# Patient Record
Sex: FEMALE | Employment: OTHER | URBAN - METROPOLITAN AREA
[De-identification: names, ages, dates, MRNs, and addresses within clinical notes are randomized per-mention and may not be internally consistent; named-entity substitution may affect disease eponyms.]

---

## 2017-07-25 ENCOUNTER — APPOINTMENT (OUTPATIENT)
Dept: CT IMAGING | Age: 67
End: 2017-07-25
Attending: EMERGENCY MEDICINE
Payer: COMMERCIAL

## 2017-07-25 ENCOUNTER — HOSPITAL ENCOUNTER (EMERGENCY)
Age: 67
Discharge: HOME | End: 2017-07-25
Attending: EMERGENCY MEDICINE
Payer: COMMERCIAL

## 2017-07-25 VITALS
WEIGHT: 167.11 LBS | TEMPERATURE: 98 F | SYSTOLIC BLOOD PRESSURE: 123 MMHG | HEART RATE: 104 BPM | RESPIRATION RATE: 18 BRPM | DIASTOLIC BLOOD PRESSURE: 69 MMHG | BODY MASS INDEX: 29.61 KG/M2 | HEIGHT: 63 IN | OXYGEN SATURATION: 97 %

## 2017-07-25 DIAGNOSIS — R10.13 ABDOMINAL PAIN, EPIGASTRIC: Primary | ICD-10-CM

## 2017-07-25 DIAGNOSIS — M54.50 ACUTE MIDLINE LOW BACK PAIN WITHOUT SCIATICA: ICD-10-CM

## 2017-07-25 LAB
ALBUMIN SERPL BCP-MCNC: 3.7 G/DL (ref 3.5–5)
ALBUMIN/GLOB SERPL: 0.9 {RATIO} (ref 1.1–2.2)
ALP SERPL-CCNC: 93 U/L (ref 45–117)
ALT SERPL-CCNC: 27 U/L (ref 12–78)
ANION GAP BLD CALC-SCNC: 8 MMOL/L (ref 5–15)
APPEARANCE UR: ABNORMAL
AST SERPL W P-5'-P-CCNC: 21 U/L (ref 15–37)
BACTERIA URNS QL MICRO: ABNORMAL /HPF
BASOPHILS # BLD AUTO: 0.1 K/UL (ref 0–0.1)
BASOPHILS # BLD: 1 % (ref 0–1)
BILIRUB SERPL-MCNC: 0.6 MG/DL (ref 0.2–1)
BILIRUB UR QL: NEGATIVE
BUN SERPL-MCNC: 29 MG/DL (ref 6–20)
BUN/CREAT SERPL: 28 (ref 12–20)
CALCIUM SERPL-MCNC: 9 MG/DL (ref 8.5–10.1)
CHLORIDE SERPL-SCNC: 103 MMOL/L (ref 97–108)
CO2 SERPL-SCNC: 26 MMOL/L (ref 21–32)
COLOR UR: ABNORMAL
CREAT SERPL-MCNC: 1.04 MG/DL (ref 0.55–1.02)
DIFFERENTIAL METHOD BLD: ABNORMAL
EOSINOPHIL # BLD: 0.3 K/UL (ref 0–0.4)
EOSINOPHIL NFR BLD: 3 % (ref 0–7)
EPITH CASTS URNS QL MICRO: ABNORMAL /LPF
ERYTHROCYTE [DISTWIDTH] IN BLOOD BY AUTOMATED COUNT: 12.8 % (ref 11.5–14.5)
ETHANOL SERPL-MCNC: <10 MG/DL
GLOBULIN SER CALC-MCNC: 3.9 G/DL (ref 2–4)
GLUCOSE SERPL-MCNC: 115 MG/DL (ref 65–100)
GLUCOSE UR STRIP.AUTO-MCNC: NEGATIVE MG/DL
HCT VFR BLD AUTO: 39.5 % (ref 35–47)
HGB BLD-MCNC: 13.4 G/DL (ref 11.5–16)
HGB UR QL STRIP: NEGATIVE
KETONES UR QL STRIP.AUTO: ABNORMAL MG/DL
LEUKOCYTE ESTERASE UR QL STRIP.AUTO: ABNORMAL
LIPASE SERPL-CCNC: 196 U/L (ref 73–393)
LYMPHOCYTES # BLD AUTO: 31 % (ref 12–49)
LYMPHOCYTES # BLD: 3.4 K/UL (ref 0.8–3.5)
MCH RBC QN AUTO: 36.7 PG (ref 26–34)
MCHC RBC AUTO-ENTMCNC: 33.9 G/DL (ref 30–36.5)
MCV RBC AUTO: 108.2 FL (ref 80–99)
MONOCYTES # BLD: 0.3 K/UL (ref 0–1)
MONOCYTES NFR BLD AUTO: 3 % (ref 5–13)
NEUTS SEG # BLD: 7 K/UL (ref 1.8–8)
NEUTS SEG NFR BLD AUTO: 62 % (ref 32–75)
NITRITE UR QL STRIP.AUTO: NEGATIVE
PH UR STRIP: 6 [PH] (ref 5–8)
PLATELET # BLD AUTO: 193 K/UL (ref 150–400)
POTASSIUM SERPL-SCNC: 3.5 MMOL/L (ref 3.5–5.1)
PROT SERPL-MCNC: 7.6 G/DL (ref 6.4–8.2)
PROT UR STRIP-MCNC: NEGATIVE MG/DL
RBC # BLD AUTO: 3.65 M/UL (ref 3.8–5.2)
RBC #/AREA URNS HPF: ABNORMAL /HPF (ref 0–5)
RBC MORPH BLD: ABNORMAL
SODIUM SERPL-SCNC: 137 MMOL/L (ref 136–145)
SP GR UR REFRACTOMETRY: 1.02 (ref 1–1.03)
TROPONIN I SERPL-MCNC: <0.04 NG/ML
UA: UC IF INDICATED,UAUC: ABNORMAL
UROBILINOGEN UR QL STRIP.AUTO: 0.2 EU/DL (ref 0.2–1)
WBC # BLD AUTO: 11.1 K/UL (ref 3.6–11)
WBC MORPH BLD: ABNORMAL
WBC URNS QL MICRO: ABNORMAL /HPF (ref 0–4)

## 2017-07-25 PROCEDURE — 74011636320 HC RX REV CODE- 636/320: Performed by: EMERGENCY MEDICINE

## 2017-07-25 PROCEDURE — 80053 COMPREHEN METABOLIC PANEL: CPT | Performed by: EMERGENCY MEDICINE

## 2017-07-25 PROCEDURE — 85025 COMPLETE CBC W/AUTO DIFF WBC: CPT | Performed by: EMERGENCY MEDICINE

## 2017-07-25 PROCEDURE — 36415 COLL VENOUS BLD VENIPUNCTURE: CPT | Performed by: EMERGENCY MEDICINE

## 2017-07-25 PROCEDURE — 93005 ELECTROCARDIOGRAM TRACING: CPT

## 2017-07-25 PROCEDURE — 84484 ASSAY OF TROPONIN QUANT: CPT | Performed by: EMERGENCY MEDICINE

## 2017-07-25 PROCEDURE — 83690 ASSAY OF LIPASE: CPT | Performed by: EMERGENCY MEDICINE

## 2017-07-25 PROCEDURE — 87086 URINE CULTURE/COLONY COUNT: CPT | Performed by: EMERGENCY MEDICINE

## 2017-07-25 PROCEDURE — 87147 CULTURE TYPE IMMUNOLOGIC: CPT | Performed by: EMERGENCY MEDICINE

## 2017-07-25 PROCEDURE — 74011250636 HC RX REV CODE- 250/636: Performed by: EMERGENCY MEDICINE

## 2017-07-25 PROCEDURE — 81001 URINALYSIS AUTO W/SCOPE: CPT | Performed by: EMERGENCY MEDICINE

## 2017-07-25 PROCEDURE — 99284 EMERGENCY DEPT VISIT MOD MDM: CPT

## 2017-07-25 PROCEDURE — 74177 CT ABD & PELVIS W/CONTRAST: CPT

## 2017-07-25 PROCEDURE — 80307 DRUG TEST PRSMV CHEM ANLYZR: CPT | Performed by: EMERGENCY MEDICINE

## 2017-07-25 RX ORDER — SODIUM CHLORIDE 9 MG/ML
50 INJECTION, SOLUTION INTRAVENOUS
Status: COMPLETED | OUTPATIENT
Start: 2017-07-25 | End: 2017-07-25

## 2017-07-25 RX ORDER — ACETAMINOPHEN AND CODEINE PHOSPHATE 300; 30 MG/1; MG/1
1-2 TABLET ORAL
Qty: 20 TAB | Refills: 0 | Status: SHIPPED | OUTPATIENT
Start: 2017-07-25 | End: 2017-08-04

## 2017-07-25 RX ORDER — SODIUM CHLORIDE 0.9 % (FLUSH) 0.9 %
10 SYRINGE (ML) INJECTION
Status: COMPLETED | OUTPATIENT
Start: 2017-07-25 | End: 2017-07-25

## 2017-07-25 RX ORDER — RANITIDINE 150 MG/1
150 TABLET, FILM COATED ORAL 2 TIMES DAILY
Qty: 20 TAB | Refills: 0 | Status: SHIPPED | OUTPATIENT
Start: 2017-07-25 | End: 2017-08-04

## 2017-07-25 RX ORDER — DICYCLOMINE HYDROCHLORIDE 20 MG/1
20 TABLET ORAL
Qty: 20 TAB | Refills: 0 | Status: SHIPPED | OUTPATIENT
Start: 2017-07-25 | End: 2017-11-09

## 2017-07-25 RX ADMIN — SODIUM CHLORIDE 50 ML/HR: 900 INJECTION, SOLUTION INTRAVENOUS at 18:05

## 2017-07-25 RX ADMIN — IOPAMIDOL 100 ML: 755 INJECTION, SOLUTION INTRAVENOUS at 18:05

## 2017-07-25 RX ADMIN — Medication 10 ML: at 18:05

## 2017-07-25 NOTE — DISCHARGE INSTRUCTIONS
Abdominal Pain: Care Instructions  Your Care Instructions    Abdominal pain has many possible causes. Some aren't serious and get better on their own in a few days. Others need more testing and treatment. If your pain continues or gets worse, you need to be rechecked and may need more tests to find out what is wrong. You may need surgery to correct the problem. Don't ignore new symptoms, such as fever, nausea and vomiting, urination problems, pain that gets worse, and dizziness. These may be signs of a more serious problem. Your doctor may have recommended a follow-up visit in the next 8 to 12 hours. If you are not getting better, you may need more tests or treatment. The doctor has checked you carefully, but problems can develop later. If you notice any problems or new symptoms, get medical treatment right away. Follow-up care is a key part of your treatment and safety. Be sure to make and go to all appointments, and call your doctor if you are having problems. It's also a good idea to know your test results and keep a list of the medicines you take. How can you care for yourself at home? · Rest until you feel better. · To prevent dehydration, drink plenty of fluids, enough so that your urine is light yellow or clear like water. Choose water and other caffeine-free clear liquids until you feel better. If you have kidney, heart, or liver disease and have to limit fluids, talk with your doctor before you increase the amount of fluids you drink. · If your stomach is upset, eat mild foods, such as rice, dry toast or crackers, bananas, and applesauce. Try eating several small meals instead of two or three large ones. · Wait until 48 hours after all symptoms have gone away before you have spicy foods, alcohol, and drinks that contain caffeine. · Do not eat foods that are high in fat. · Avoid anti-inflammatory medicines such as aspirin, ibuprofen (Advil, Motrin), and naproxen (Aleve).  These can cause stomach upset. Talk to your doctor if you take daily aspirin for another health problem. When should you call for help? Call 911 anytime you think you may need emergency care. For example, call if:  · You passed out (lost consciousness). · You pass maroon or very bloody stools. · You vomit blood or what looks like coffee grounds. · You have new, severe belly pain. Call your doctor now or seek immediate medical care if:  · Your pain gets worse, especially if it becomes focused in one area of your belly. · You have a new or higher fever. · Your stools are black and look like tar, or they have streaks of blood. · You have unexpected vaginal bleeding. · You have symptoms of a urinary tract infection. These may include:  ¨ Pain when you urinate. ¨ Urinating more often than usual.  ¨ Blood in your urine. · You are dizzy or lightheaded, or you feel like you may faint. Watch closely for changes in your health, and be sure to contact your doctor if:  · You are not getting better after 1 day (24 hours). Where can you learn more? Go to http://geminiLuciduxfeliciano.info/. Enter E056 in the search box to learn more about \"Abdominal Pain: Care Instructions. \"  Current as of: March 20, 2017  Content Version: 11.3  © 7034-6169 [employer]. Care instructions adapted under license by [employer] (which disclaims liability or warranty for this information). If you have questions about a medical condition or this instruction, always ask your healthcare professional. [place] [address] any warranty or liability for your use of this information. Back Pain: Care Instructions  Your Care Instructions    Back pain has many possible causes. It is often related to problems with muscles and ligaments of the back. It may also be related to problems with the nerves, discs, or bones of the back.  Moving, lifting, standing, sitting, or sleeping in an awkward way can strain the back. Sometimes you don't notice the injury until later. Arthritis is another common cause of back pain. Although it may hurt a lot, back pain usually improves on its own within several weeks. Most people recover in 12 weeks or less. Using good home treatment and being careful not to stress your back can help you feel better sooner. Follow-up care is a key part of your treatment and safety. Be sure to make and go to all appointments, and call your doctor if you are having problems. Its also a good idea to know your test results and keep a list of the medicines you take. How can you care for yourself at home? · Sit or lie in positions that are most comfortable and reduce your pain. Try one of these positions when you lie down:  ¨ Lie on your back with your knees bent and supported by large pillows. ¨ Lie on the floor with your legs on the seat of a sofa or chair. Anna Husk on your side with your knees and hips bent and a pillow between your legs. ¨ Lie on your stomach if it does not make pain worse. · Do not sit up in bed, and avoid soft couches and twisted positions. Bed rest can help relieve pain at first, but it delays healing. Avoid bed rest after the first day of back pain. · Change positions every 30 minutes. If you must sit for long periods of time, take breaks from sitting. Get up and walk around, or lie in a comfortable position. · Try using a heating pad on a low or medium setting for 15 to 20 minutes every 2 or 3 hours. Try a warm shower in place of one session with the heating pad. · You can also try an ice pack for 10 to 15 minutes every 2 to 3 hours. Put a thin cloth between the ice pack and your skin. · Take pain medicines exactly as directed. ¨ If the doctor gave you a prescription medicine for pain, take it as prescribed. ¨ If you are not taking a prescription pain medicine, ask your doctor if you can take an over-the-counter medicine. · Take short walks several times a day.  You can start with 5 to 10 minutes, 3 or 4 times a day, and work up to longer walks. Walk on level surfaces and avoid hills and stairs until your back is better. · Return to work and other activities as soon as you can. Continued rest without activity is usually not good for your back. · To prevent future back pain, do exercises to stretch and strengthen your back and stomach. Learn how to use good posture, safe lifting techniques, and proper body mechanics. When should you call for help? Call your doctor now or seek immediate medical care if:  · You have new or worsening numbness in your legs. · You have new or worsening weakness in your legs. (This could make it hard to stand up.)  · You lose control of your bladder or bowels. Watch closely for changes in your health, and be sure to contact your doctor if:  · Your pain gets worse. · You are not getting better after 2 weeks. Where can you learn more? Go to http://gemini-feliciano.info/. Enter E451 in the search box to learn more about \"Back Pain: Care Instructions. \"  Current as of: March 21, 2017  Content Version: 11.3  © 7155-1544 [employer]. Care instructions adapted under license by [employer] (which disclaims liability or warranty for this information). If you have questions about a medical condition or this instruction, always ask your healthcare professional. [address] any warranty or liability for your use of this information.

## 2017-07-25 NOTE — ED NOTES
Assumed care of pt from Young Mendes RN. Pt resting comfortably with side rails up and call bell in reach. Pt aware of plan to await for MD re-evaluation.

## 2017-07-25 NOTE — ED PROVIDER NOTES
HPI Comments: Horace Ballard is a 78 y.o. female with PMHx significant for EtOH abuse, foot drop and SHx significant for back surgery  who presents ambulatory to the ED with cc of intermittent upper abdominal pain with radiation to the back rated 10/10 x 4 days. There are no reported alleviating or exacerbating factors. Pt reports that she was referred to the ER by her PCP \"for an MRI\" ; pt states that she does notknow why her PCP wanted this test.  She states that she drinks EtOH daily but denies any h/o pancreatitis or ulcers. Of note, pt states that she has a h/o lower back surgery and foot drop. She denies any N/V/D, F/C, CP, SOB, dysuria/hematuria. PCP: Irving Cavazos MD    Social History: smoking (.5 ppd) GTQF(62/7 oz/week) illicit drug (-)      There are no other complaints, changes, or physical findings at this time. The history is provided by the patient. Past Medical History:   Diagnosis Date    Alcohol abuse     Foot drop, left        Past Surgical History:   Procedure Laterality Date    HX HYSTERECTOMY      HX TONSILLECTOMY           No family history on file. Social History     Social History    Marital status:      Spouse name: N/A    Number of children: N/A    Years of education: N/A     Occupational History    Not on file. Social History Main Topics    Smoking status: Current Every Day Smoker     Packs/day: 0.50    Smokeless tobacco: Not on file    Alcohol use 17.5 oz/week     35 Shots of liquor per week    Drug use: No    Sexual activity: Not on file     Other Topics Concern    Not on file     Social History Narrative    No narrative on file         ALLERGIES: Scallops    Review of Systems   Constitutional: Negative for chills and fever. HENT: Negative for congestion, ear pain, rhinorrhea, sore throat and trouble swallowing. Eyes: Negative for visual disturbance. Respiratory: Negative for cough, chest tightness and shortness of breath. Cardiovascular: Negative for chest pain and palpitations. Gastrointestinal: Positive for abdominal pain. Negative for blood in stool, constipation, diarrhea, nausea and vomiting. Genitourinary: Negative for decreased urine volume, difficulty urinating, dysuria and frequency. Musculoskeletal: Negative for back pain and neck pain. Skin: Negative for color change and rash. Neurological: Negative for dizziness, weakness, light-headedness and headaches. Vitals:    07/25/17 1631   BP: 134/70   Pulse: (!) 104   Resp: 18   Temp: 98 °F (36.7 °C)   SpO2: 96%   Weight: 75.8 kg (167 lb 1.7 oz)   Height: 5' 3\" (1.6 m)            Physical Exam   Constitutional: She is oriented to person, place, and time. Vital signs are normal. She appears well-developed and well-nourished. She does not appear ill. No distress. HENT:   Mouth/Throat: Oropharynx is clear and moist.   Eyes: Conjunctivae are normal.   Neck: Neck supple. Cardiovascular: Normal rate and regular rhythm. Pulmonary/Chest: Effort normal and breath sounds normal. No accessory muscle usage. No respiratory distress. Abdominal: Soft. She exhibits no distension. There is tenderness. RUQ and epigastric tenderness   Musculoskeletal: She exhibits tenderness. Midline upper L spine tenderness    Lymphadenopathy:     She has no cervical adenopathy. Neurological: She is alert and oriented to person, place, and time. She has normal strength. No cranial nerve deficit or sensory deficit. Skin: Skin is warm and dry. Nursing note and vitals reviewed. MDM  Number of Diagnoses or Management Options  Abdominal pain, epigastric:   Acute midline low back pain without sciatica:   Diagnosis management comments: Ddx: pancreatitis, hepatitis, gallbladder dz, lumbar compression fx, pinched nerve, PUD, gastritis    No evidence of pancreatitis. She may have a radiculopathy from her back.   MRI would be beneficial, but would be more appropriately done outpatient through her PCP. She may wish to see GI for endoscopy. Given her history of alcoholism, there is concern for peptic ulcers. Amount and/or Complexity of Data Reviewed  Clinical lab tests: ordered and reviewed  Tests in the radiology section of CPT®: reviewed and ordered  Review and summarize past medical records: yes    Patient Progress  Patient progress: stable    ED Course       Procedures  EKG interpretation: (Preliminary) 1924  Rhythm: normal sinus rhythm; and regular . Rate (approx.): 96; Axis: normal; SD interval: normal; QRS interval: normal ; ST/T wave: normal;  Other findings: normal.    DISCHARGE NOTE:  LABORATORY TESTS:  Recent Results (from the past 12 hour(s))   CBC WITH AUTOMATED DIFF    Collection Time: 07/25/17  4:47 PM   Result Value Ref Range    WBC 11.1 (H) 3.6 - 11.0 K/uL    RBC 3.65 (L) 3.80 - 5.20 M/uL    HGB 13.4 11.5 - 16.0 g/dL    HCT 39.5 35.0 - 47.0 %    .2 (H) 80.0 - 99.0 FL    MCH 36.7 (H) 26.0 - 34.0 PG    MCHC 33.9 30.0 - 36.5 g/dL    RDW 12.8 11.5 - 14.5 %    PLATELET 115 402 - 765 K/uL    NEUTROPHILS 62 32 - 75 %    LYMPHOCYTES 31 12 - 49 %    MONOCYTES 3 (L) 5 - 13 %    EOSINOPHILS 3 0 - 7 %    BASOPHILS 1 0 - 1 %    ABS. NEUTROPHILS 7.0 1.8 - 8.0 K/UL    ABS. LYMPHOCYTES 3.4 0.8 - 3.5 K/UL    ABS. MONOCYTES 0.3 0.0 - 1.0 K/UL    ABS. EOSINOPHILS 0.3 0.0 - 0.4 K/UL    ABS.  BASOPHILS 0.1 0.0 - 0.1 K/UL    DF SMEAR SCANNED      RBC COMMENTS MACROCYTOSIS  1+        WBC COMMENTS REACTIVE LYMPHS     METABOLIC PANEL, COMPREHENSIVE    Collection Time: 07/25/17  4:47 PM   Result Value Ref Range    Sodium 137 136 - 145 mmol/L    Potassium 3.5 3.5 - 5.1 mmol/L    Chloride 103 97 - 108 mmol/L    CO2 26 21 - 32 mmol/L    Anion gap 8 5 - 15 mmol/L    Glucose 115 (H) 65 - 100 mg/dL    BUN 29 (H) 6 - 20 MG/DL    Creatinine 1.04 (H) 0.55 - 1.02 MG/DL    BUN/Creatinine ratio 28 (H) 12 - 20      GFR est AA >60 >60 ml/min/1.73m2    GFR est non-AA 53 (L) >60 ml/min/1.73m2 Calcium 9.0 8.5 - 10.1 MG/DL    Bilirubin, total 0.6 0.2 - 1.0 MG/DL    ALT (SGPT) 27 12 - 78 U/L    AST (SGOT) 21 15 - 37 U/L    Alk. phosphatase 93 45 - 117 U/L    Protein, total 7.6 6.4 - 8.2 g/dL    Albumin 3.7 3.5 - 5.0 g/dL    Globulin 3.9 2.0 - 4.0 g/dL    A-G Ratio 0.9 (L) 1.1 - 2.2     LIPASE    Collection Time: 07/25/17  4:47 PM   Result Value Ref Range    Lipase 196 73 - 393 U/L   URINALYSIS W/ REFLEX CULTURE    Collection Time: 07/25/17  4:47 PM   Result Value Ref Range    Color YELLOW/STRAW      Appearance CLOUDY (A) CLEAR      Specific gravity 1.018 1.003 - 1.030      pH (UA) 6.0 5.0 - 8.0      Protein NEGATIVE  NEG mg/dL    Glucose NEGATIVE  NEG mg/dL    Ketone TRACE (A) NEG mg/dL    Bilirubin NEGATIVE  NEG      Blood NEGATIVE  NEG      Urobilinogen 0.2 0.2 - 1.0 EU/dL    Nitrites NEGATIVE  NEG      Leukocyte Esterase LARGE (A) NEG      WBC 10-20 0 - 4 /hpf    RBC 0-5 0 - 5 /hpf    Epithelial cells FEW FEW /lpf    Bacteria 1+ (A) NEG /hpf    UA:UC IF INDICATED URINE CULTURE ORDERED (A) CNI     TROPONIN I    Collection Time: 07/25/17  4:47 PM   Result Value Ref Range    Troponin-I, Qt. <0.04 <0.05 ng/mL   ETHYL ALCOHOL    Collection Time: 07/25/17  5:13 PM   Result Value Ref Range    ALCOHOL(ETHYL),SERUM <10 <10 MG/DL       IMAGING RESULTS:  CT ABD PELV W CONT   Final ResultEXAM: CT ABDOMEN PELVIS WITH CONTRAST  INDICATION:  Epigastric abdominal pain, 5 day history. COMPARISON: None. CONTRAST: 100 mL of Isovue-370.     TECHNIQUE:   Multislice helical CT was performed from the diaphragm to the symphysis pubis  during uneventful rapid bolus intravenous contrast administration. Oral contrast  was not administered. Contiguous 5 mm axial images were reconstructed and lung  and soft tissue windows were generated. Coronal and sagittal reformations were  generated.  CT dose reduction was achieved through use of a standardized protocol  tailored for this examination and automatic exposure control for dose  modulation.     FINDINGS:  LOWER CHEST: The visualized portions of the lung bases are clear.     ABDOMEN:  Liver: No focal lesions. Normal size and contour. Gallbladder and bile ducts: Not well distended. No obvious stones. No biliary  dilatation. Spleen: No abnormality. Pancreas: No abnormality. Adrenal glands: No abnormality. Kidneys: No abnormality.     PELVIS:  Reproductive organs:  Prior hysterectomy. Bladder: No abnormality.      BOWEL AND MESENTERY: Stomach appears unremarkable. No small bowel  abnormalities. Colonic diverticulosis without CT evidence of acute  diverticulitis. No mesenteric mass or adenopathy. Appendix is nondistended.     PERITONEUM: No ascites or free intraperitoneal air.     RETROPERITONEUM: Aorta atherosclerotic aorta without aneurysm. . No  retroperitoneal adenopathy or mass. No pelvic mass or adenopathy.     BONES AND SOFT TISSUES: No significant bony abnormalities.      IMPRESSION  IMPRESSION:   1. No acute findings in the abdomen or pelvis. 2. Colonic diverticulosis. .             MEDICATIONS GIVEN:  Medications   0.9% sodium chloride infusion (50 mL/hr IntraVENous New Bag 7/25/17 1805)   iopamidol (ISOVUE-370) 76 % injection 100 mL (100 mL IntraVENous Given 7/25/17 1805)   sodium chloride (NS) flush 10 mL (10 mL IntraVENous Given 7/25/17 1805)       IMPRESSION:  1. Abdominal pain, epigastric    2. Acute midline low back pain without sciatica    3. Alcohol abuse    PLAN:  Current Discharge Medication List      START taking these medications    Details   raNITIdine (ZANTAC) 150 mg tablet Take 1 Tab by mouth two (2) times a day for 10 days. Qty: 20 Tab, Refills: 0      dicyclomine (BENTYL) 20 mg tablet Take 1 Tab by mouth every six (6) hours as needed (abdominal cramps). Qty: 20 Tab, Refills: 0      acetaminophen-codeine (TYLENOL-CODEINE #3) 300-30 mg per tablet Take 1-2 Tabs by mouth every six (6) hours as needed for Pain. Max Daily Amount: 8 Tabs.   Qty: 20 Tab, Refills: 0           Follow-up Information     Follow up With Details Comments 48 Judy Montgomery MD Schedule an appointment as soon as possible for a visit  [address] [place] [phone]      Monica Bagley NP Schedule an appointment as soon as possible for a visit  [address] [facility]  [phone]          Return to ED if worse       Discharge Note:  8:00 PM  The patient has been re-evaluated and is ready for discharge. Reviewed available results with patient. Counseled patient on diagnosis and care plan. Patient has expressed understanding, and all questions have been answered. Patient agrees with plan and agrees to follow up as recommended, or to return to the ED if their symptoms worsen. Discharge instructions have been provided and explained to the patient, along with reasons to return to the ED. Written by Deepali Ramirez, ED Scribe, as dictated by Maki Zamarripa MD.       ATTESTATION:  This note is prepared by Deepali Ramirez, acting as Scribe for Maki Zamarripa MD.    Maki Zamarripa MD :The scribe's documentation has been prepared under my direction and personally reviewed by me in its entirety. I confirm that the note above accurately reflects all work, treatment, procedures, and medical decision making performed by me.

## 2017-07-26 ENCOUNTER — APPOINTMENT (OUTPATIENT)
Dept: GENERAL RADIOLOGY | Age: 67
DRG: 853 | End: 2017-07-26
Attending: EMERGENCY MEDICINE
Payer: COMMERCIAL

## 2017-07-26 ENCOUNTER — APPOINTMENT (OUTPATIENT)
Dept: CT IMAGING | Age: 67
DRG: 853 | End: 2017-07-26
Attending: EMERGENCY MEDICINE
Payer: COMMERCIAL

## 2017-07-26 ENCOUNTER — ANESTHESIA EVENT (OUTPATIENT)
Dept: SURGERY | Age: 67
DRG: 853 | End: 2017-07-26
Payer: COMMERCIAL

## 2017-07-26 ENCOUNTER — HOSPITAL ENCOUNTER (INPATIENT)
Age: 67
LOS: 9 days | Discharge: HOME HEALTH | DRG: 853 | End: 2017-08-04
Attending: EMERGENCY MEDICINE | Admitting: SURGERY
Payer: COMMERCIAL

## 2017-07-26 ENCOUNTER — ANESTHESIA (OUTPATIENT)
Dept: SURGERY | Age: 67
DRG: 853 | End: 2017-07-26
Payer: COMMERCIAL

## 2017-07-26 DIAGNOSIS — R19.8 PERFORATED VISCUS: Primary | ICD-10-CM

## 2017-07-26 DIAGNOSIS — F10.20 CHRONIC ALCOHOLISM (HCC): ICD-10-CM

## 2017-07-26 DIAGNOSIS — N17.9 AKI (ACUTE KIDNEY INJURY) (HCC): ICD-10-CM

## 2017-07-26 LAB
ALBUMIN SERPL BCP-MCNC: 3.6 G/DL (ref 3.5–5)
ALBUMIN/GLOB SERPL: 0.9 {RATIO} (ref 1.1–2.2)
ALP SERPL-CCNC: 90 U/L (ref 45–117)
ALT SERPL-CCNC: 34 U/L (ref 12–78)
ANION GAP BLD CALC-SCNC: 10 MMOL/L (ref 5–15)
APTT PPP: 25.9 SEC (ref 22.1–32.5)
AST SERPL W P-5'-P-CCNC: 33 U/L (ref 15–37)
ATRIAL RATE: 96 BPM
BASOPHILS # BLD AUTO: 0 K/UL (ref 0–0.1)
BASOPHILS # BLD: 0 % (ref 0–1)
BILIRUB SERPL-MCNC: 0.4 MG/DL (ref 0.2–1)
BUN SERPL-MCNC: 31 MG/DL (ref 6–20)
BUN/CREAT SERPL: 17 (ref 12–20)
CALCIUM SERPL-MCNC: 8.8 MG/DL (ref 8.5–10.1)
CALCULATED P AXIS, ECG09: 23 DEGREES
CALCULATED R AXIS, ECG10: -4 DEGREES
CALCULATED T AXIS, ECG11: 35 DEGREES
CHLORIDE SERPL-SCNC: 102 MMOL/L (ref 97–108)
CO2 SERPL-SCNC: 24 MMOL/L (ref 21–32)
CREAT SERPL-MCNC: 1.86 MG/DL (ref 0.55–1.02)
DIAGNOSIS, 93000: NORMAL
EOSINOPHIL # BLD: 0.1 K/UL (ref 0–0.4)
EOSINOPHIL NFR BLD: 1 % (ref 0–7)
ERYTHROCYTE [DISTWIDTH] IN BLOOD BY AUTOMATED COUNT: 12.6 % (ref 11.5–14.5)
GLOBULIN SER CALC-MCNC: 4 G/DL (ref 2–4)
GLUCOSE SERPL-MCNC: 161 MG/DL (ref 65–100)
HCT VFR BLD AUTO: 39.4 % (ref 35–47)
HGB BLD-MCNC: 13 G/DL (ref 11.5–16)
LACTATE SERPL-SCNC: 1.9 MMOL/L (ref 0.4–2)
LIPASE SERPL-CCNC: 253 U/L (ref 73–393)
LYMPHOCYTES # BLD AUTO: 18 % (ref 12–49)
LYMPHOCYTES # BLD: 2.5 K/UL (ref 0.8–3.5)
MCH RBC QN AUTO: 35.7 PG (ref 26–34)
MCHC RBC AUTO-ENTMCNC: 33 G/DL (ref 30–36.5)
MCV RBC AUTO: 108.2 FL (ref 80–99)
MONOCYTES # BLD: 0.4 K/UL (ref 0–1)
MONOCYTES NFR BLD AUTO: 3 % (ref 5–13)
NEUTS SEG # BLD: 11 K/UL (ref 1.8–8)
NEUTS SEG NFR BLD AUTO: 78 % (ref 32–75)
P-R INTERVAL, ECG05: 162 MS
PLATELET # BLD AUTO: 213 K/UL (ref 150–400)
POTASSIUM SERPL-SCNC: 3.8 MMOL/L (ref 3.5–5.1)
PROT SERPL-MCNC: 7.6 G/DL (ref 6.4–8.2)
Q-T INTERVAL, ECG07: 366 MS
QRS DURATION, ECG06: 80 MS
QTC CALCULATION (BEZET), ECG08: 462 MS
RBC # BLD AUTO: 3.64 M/UL (ref 3.8–5.2)
RBC MORPH BLD: ABNORMAL
SODIUM SERPL-SCNC: 136 MMOL/L (ref 136–145)
THERAPEUTIC RANGE,PTTT: NORMAL SECS (ref 58–77)
TROPONIN I SERPL-MCNC: <0.04 NG/ML
VENTRICULAR RATE, ECG03: 96 BPM
WBC # BLD AUTO: 14 K/UL (ref 3.6–11)
WBC MORPH BLD: ABNORMAL

## 2017-07-26 PROCEDURE — 77030026438 HC STYL ET INTUB CARD -A: Performed by: NURSE ANESTHETIST, CERTIFIED REGISTERED

## 2017-07-26 PROCEDURE — 76060000033 HC ANESTHESIA 1 TO 1.5 HR: Performed by: SURGERY

## 2017-07-26 PROCEDURE — 80053 COMPREHEN METABOLIC PANEL: CPT | Performed by: EMERGENCY MEDICINE

## 2017-07-26 PROCEDURE — 77030002996 HC SUT SLK J&J -A: Performed by: SURGERY

## 2017-07-26 PROCEDURE — 76210000016 HC OR PH I REC 1 TO 1.5 HR: Performed by: SURGERY

## 2017-07-26 PROCEDURE — 93005 ELECTROCARDIOGRAM TRACING: CPT

## 2017-07-26 PROCEDURE — 85610 PROTHROMBIN TIME: CPT

## 2017-07-26 PROCEDURE — 87075 CULTR BACTERIA EXCEPT BLOOD: CPT | Performed by: SURGERY

## 2017-07-26 PROCEDURE — 77030019702 HC WRP THER MENM -C: Performed by: SURGERY

## 2017-07-26 PROCEDURE — 96375 TX/PRO/DX INJ NEW DRUG ADDON: CPT

## 2017-07-26 PROCEDURE — 74011250636 HC RX REV CODE- 250/636

## 2017-07-26 PROCEDURE — 74011250636 HC RX REV CODE- 250/636: Performed by: SURGERY

## 2017-07-26 PROCEDURE — 87205 SMEAR GRAM STAIN: CPT | Performed by: SURGERY

## 2017-07-26 PROCEDURE — 77030012407 HC DRN WND BARD -B: Performed by: SURGERY

## 2017-07-26 PROCEDURE — 77030008467 HC STPLR SKN COVD -B: Performed by: SURGERY

## 2017-07-26 PROCEDURE — 65270000029 HC RM PRIVATE

## 2017-07-26 PROCEDURE — 36415 COLL VENOUS BLD VENIPUNCTURE: CPT | Performed by: EMERGENCY MEDICINE

## 2017-07-26 PROCEDURE — 83605 ASSAY OF LACTIC ACID: CPT | Performed by: EMERGENCY MEDICINE

## 2017-07-26 PROCEDURE — 77030034849: Performed by: SURGERY

## 2017-07-26 PROCEDURE — 74011000258 HC RX REV CODE- 258: Performed by: SURGERY

## 2017-07-26 PROCEDURE — 74011000250 HC RX REV CODE- 250

## 2017-07-26 PROCEDURE — 0DQ70ZZ REPAIR STOMACH, PYLORUS, OPEN APPROACH: ICD-10-PCS | Performed by: SURGERY

## 2017-07-26 PROCEDURE — 77030008684 HC TU ET CUF COVD -B: Performed by: NURSE ANESTHETIST, CERTIFIED REGISTERED

## 2017-07-26 PROCEDURE — 77030002916 HC SUT ETHLN J&J -A: Performed by: SURGERY

## 2017-07-26 PROCEDURE — 77030013567 HC DRN WND RESERV BARD -A: Performed by: SURGERY

## 2017-07-26 PROCEDURE — 86900 BLOOD TYPING SEROLOGIC ABO: CPT | Performed by: EMERGENCY MEDICINE

## 2017-07-26 PROCEDURE — 83690 ASSAY OF LIPASE: CPT | Performed by: EMERGENCY MEDICINE

## 2017-07-26 PROCEDURE — 76010000149 HC OR TIME 1 TO 1.5 HR: Performed by: SURGERY

## 2017-07-26 PROCEDURE — 99284 EMERGENCY DEPT VISIT MOD MDM: CPT

## 2017-07-26 PROCEDURE — 77030018836 HC SOL IRR NACL ICUM -A: Performed by: SURGERY

## 2017-07-26 PROCEDURE — 86920 COMPATIBILITY TEST SPIN: CPT | Performed by: EMERGENCY MEDICINE

## 2017-07-26 PROCEDURE — 77030002966 HC SUT PDS J&J -A: Performed by: SURGERY

## 2017-07-26 PROCEDURE — 85025 COMPLETE CBC W/AUTO DIFF WBC: CPT | Performed by: EMERGENCY MEDICINE

## 2017-07-26 PROCEDURE — 77030011640 HC PAD GRND REM COVD -A: Performed by: SURGERY

## 2017-07-26 PROCEDURE — 65660000000 HC RM CCU STEPDOWN

## 2017-07-26 PROCEDURE — 71250 CT THORAX DX C-: CPT

## 2017-07-26 PROCEDURE — 74011000258 HC RX REV CODE- 258: Performed by: EMERGENCY MEDICINE

## 2017-07-26 PROCEDURE — 96374 THER/PROPH/DIAG INJ IV PUSH: CPT

## 2017-07-26 PROCEDURE — 0DU707Z SUPPLEMENT STOMACH, PYLORUS WITH AUTOLOGOUS TISSUE SUBSTITUTE, OPEN APPROACH: ICD-10-PCS | Performed by: SURGERY

## 2017-07-26 PROCEDURE — 74176 CT ABD & PELVIS W/O CONTRAST: CPT

## 2017-07-26 PROCEDURE — 74011250636 HC RX REV CODE- 250/636: Performed by: EMERGENCY MEDICINE

## 2017-07-26 PROCEDURE — 77030011267 HC ELECTRD BLD COVD -A: Performed by: SURGERY

## 2017-07-26 PROCEDURE — 96376 TX/PRO/DX INJ SAME DRUG ADON: CPT

## 2017-07-26 PROCEDURE — 77030010507 HC ADH SKN DERMBND J&J -B: Performed by: SURGERY

## 2017-07-26 PROCEDURE — 77030011266 HC ELECTRD BLD INSL COVD -A: Performed by: SURGERY

## 2017-07-26 PROCEDURE — 85730 THROMBOPLASTIN TIME PARTIAL: CPT | Performed by: EMERGENCY MEDICINE

## 2017-07-26 PROCEDURE — 77030032490 HC SLV COMPR SCD KNE COVD -B: Performed by: SURGERY

## 2017-07-26 PROCEDURE — 74011250636 HC RX REV CODE- 250/636: Performed by: ANESTHESIOLOGY

## 2017-07-26 PROCEDURE — 77010033678 HC OXYGEN DAILY

## 2017-07-26 PROCEDURE — 84484 ASSAY OF TROPONIN QUANT: CPT | Performed by: EMERGENCY MEDICINE

## 2017-07-26 RX ORDER — SODIUM CHLORIDE, SODIUM LACTATE, POTASSIUM CHLORIDE, CALCIUM CHLORIDE 600; 310; 30; 20 MG/100ML; MG/100ML; MG/100ML; MG/100ML
25 INJECTION, SOLUTION INTRAVENOUS CONTINUOUS
Status: DISCONTINUED | OUTPATIENT
Start: 2017-07-26 | End: 2017-07-26 | Stop reason: HOSPADM

## 2017-07-26 RX ORDER — FENTANYL CITRATE 50 UG/ML
INJECTION, SOLUTION INTRAMUSCULAR; INTRAVENOUS
Status: COMPLETED
Start: 2017-07-26 | End: 2017-07-26

## 2017-07-26 RX ORDER — LIDOCAINE HYDROCHLORIDE 20 MG/ML
INJECTION, SOLUTION EPIDURAL; INFILTRATION; INTRACAUDAL; PERINEURAL AS NEEDED
Status: DISCONTINUED | OUTPATIENT
Start: 2017-07-26 | End: 2017-07-26 | Stop reason: HOSPADM

## 2017-07-26 RX ORDER — PROPOFOL 10 MG/ML
INJECTION, EMULSION INTRAVENOUS AS NEEDED
Status: DISCONTINUED | OUTPATIENT
Start: 2017-07-26 | End: 2017-07-26 | Stop reason: HOSPADM

## 2017-07-26 RX ORDER — SODIUM CHLORIDE 0.9 % (FLUSH) 0.9 %
5-10 SYRINGE (ML) INJECTION AS NEEDED
Status: DISCONTINUED | OUTPATIENT
Start: 2017-07-26 | End: 2017-07-26

## 2017-07-26 RX ORDER — SODIUM CHLORIDE 0.9 % (FLUSH) 0.9 %
5-10 SYRINGE (ML) INJECTION EVERY 8 HOURS
Status: DISCONTINUED | OUTPATIENT
Start: 2017-07-27 | End: 2017-08-03 | Stop reason: ALTCHOICE

## 2017-07-26 RX ORDER — DEXAMETHASONE SODIUM PHOSPHATE 4 MG/ML
INJECTION, SOLUTION INTRA-ARTICULAR; INTRALESIONAL; INTRAMUSCULAR; INTRAVENOUS; SOFT TISSUE AS NEEDED
Status: DISCONTINUED | OUTPATIENT
Start: 2017-07-26 | End: 2017-07-26 | Stop reason: HOSPADM

## 2017-07-26 RX ORDER — SODIUM CHLORIDE 0.9 % (FLUSH) 0.9 %
5-10 SYRINGE (ML) INJECTION AS NEEDED
Status: DISCONTINUED | OUTPATIENT
Start: 2017-07-26 | End: 2017-08-03 | Stop reason: ALTCHOICE

## 2017-07-26 RX ORDER — ROCURONIUM BROMIDE 10 MG/ML
INJECTION, SOLUTION INTRAVENOUS AS NEEDED
Status: DISCONTINUED | OUTPATIENT
Start: 2017-07-26 | End: 2017-07-26 | Stop reason: HOSPADM

## 2017-07-26 RX ORDER — NEOSTIGMINE METHYLSULFATE 1 MG/ML
INJECTION INTRAVENOUS AS NEEDED
Status: DISCONTINUED | OUTPATIENT
Start: 2017-07-26 | End: 2017-07-26 | Stop reason: HOSPADM

## 2017-07-26 RX ORDER — SODIUM CHLORIDE 9 MG/ML
125 INJECTION, SOLUTION INTRAVENOUS CONTINUOUS
Status: DISCONTINUED | OUTPATIENT
Start: 2017-07-26 | End: 2017-08-01

## 2017-07-26 RX ORDER — SODIUM CHLORIDE 0.9 % (FLUSH) 0.9 %
5-10 SYRINGE (ML) INJECTION EVERY 8 HOURS
Status: DISCONTINUED | OUTPATIENT
Start: 2017-07-26 | End: 2017-07-26 | Stop reason: HOSPADM

## 2017-07-26 RX ORDER — ONDANSETRON 2 MG/ML
INJECTION INTRAMUSCULAR; INTRAVENOUS AS NEEDED
Status: DISCONTINUED | OUTPATIENT
Start: 2017-07-26 | End: 2017-07-26 | Stop reason: HOSPADM

## 2017-07-26 RX ORDER — HYDROMORPHONE HYDROCHLORIDE 1 MG/ML
0.5 INJECTION, SOLUTION INTRAMUSCULAR; INTRAVENOUS; SUBCUTANEOUS
Status: DISCONTINUED | OUTPATIENT
Start: 2017-07-26 | End: 2017-08-03 | Stop reason: ALTCHOICE

## 2017-07-26 RX ORDER — DIPHENHYDRAMINE HYDROCHLORIDE 50 MG/ML
12.5 INJECTION, SOLUTION INTRAMUSCULAR; INTRAVENOUS AS NEEDED
Status: DISCONTINUED | OUTPATIENT
Start: 2017-07-26 | End: 2017-07-26 | Stop reason: HOSPADM

## 2017-07-26 RX ORDER — SODIUM CHLORIDE 9 MG/ML
50 INJECTION, SOLUTION INTRAVENOUS
Status: DISCONTINUED | OUTPATIENT
Start: 2017-07-26 | End: 2017-07-26

## 2017-07-26 RX ORDER — SUCCINYLCHOLINE CHLORIDE 20 MG/ML
INJECTION INTRAMUSCULAR; INTRAVENOUS AS NEEDED
Status: DISCONTINUED | OUTPATIENT
Start: 2017-07-26 | End: 2017-07-26 | Stop reason: HOSPADM

## 2017-07-26 RX ORDER — ACETAMINOPHEN 10 MG/ML
INJECTION, SOLUTION INTRAVENOUS AS NEEDED
Status: DISCONTINUED | OUTPATIENT
Start: 2017-07-26 | End: 2017-07-26 | Stop reason: HOSPADM

## 2017-07-26 RX ORDER — PHENYLEPHRINE HCL IN 0.9% NACL 0.4MG/10ML
SYRINGE (ML) INTRAVENOUS AS NEEDED
Status: DISCONTINUED | OUTPATIENT
Start: 2017-07-26 | End: 2017-07-26 | Stop reason: HOSPADM

## 2017-07-26 RX ORDER — SODIUM CHLORIDE, SODIUM LACTATE, POTASSIUM CHLORIDE, CALCIUM CHLORIDE 600; 310; 30; 20 MG/100ML; MG/100ML; MG/100ML; MG/100ML
INJECTION, SOLUTION INTRAVENOUS
Status: DISCONTINUED | OUTPATIENT
Start: 2017-07-26 | End: 2017-07-26 | Stop reason: HOSPADM

## 2017-07-26 RX ORDER — GLYCOPYRROLATE 0.2 MG/ML
INJECTION INTRAMUSCULAR; INTRAVENOUS AS NEEDED
Status: DISCONTINUED | OUTPATIENT
Start: 2017-07-26 | End: 2017-07-26 | Stop reason: HOSPADM

## 2017-07-26 RX ORDER — MORPHINE SULFATE 2 MG/ML
4 INJECTION, SOLUTION INTRAMUSCULAR; INTRAVENOUS ONCE
Status: COMPLETED | OUTPATIENT
Start: 2017-07-26 | End: 2017-07-26

## 2017-07-26 RX ORDER — HYDROMORPHONE HYDROCHLORIDE 1 MG/ML
0.2 INJECTION, SOLUTION INTRAMUSCULAR; INTRAVENOUS; SUBCUTANEOUS
Status: DISCONTINUED | OUTPATIENT
Start: 2017-07-26 | End: 2017-07-26 | Stop reason: HOSPADM

## 2017-07-26 RX ORDER — LORAZEPAM 2 MG/ML
2 INJECTION INTRAMUSCULAR
Status: DISCONTINUED | OUTPATIENT
Start: 2017-07-26 | End: 2017-07-27

## 2017-07-26 RX ORDER — ONDANSETRON 2 MG/ML
4 INJECTION INTRAMUSCULAR; INTRAVENOUS
Status: COMPLETED | OUTPATIENT
Start: 2017-07-26 | End: 2017-07-26

## 2017-07-26 RX ORDER — LORAZEPAM 2 MG/ML
2 INJECTION INTRAMUSCULAR
Status: ACTIVE | OUTPATIENT
Start: 2017-07-26 | End: 2017-07-27

## 2017-07-26 RX ORDER — HYDROMORPHONE HYDROCHLORIDE 1 MG/ML
1 INJECTION, SOLUTION INTRAMUSCULAR; INTRAVENOUS; SUBCUTANEOUS ONCE
Status: COMPLETED | OUTPATIENT
Start: 2017-07-26 | End: 2017-07-26

## 2017-07-26 RX ORDER — SODIUM CHLORIDE 0.9 % (FLUSH) 0.9 %
5-10 SYRINGE (ML) INJECTION AS NEEDED
Status: DISCONTINUED | OUTPATIENT
Start: 2017-07-26 | End: 2017-07-26 | Stop reason: HOSPADM

## 2017-07-26 RX ORDER — SODIUM CHLORIDE 0.9 % (FLUSH) 0.9 %
10 SYRINGE (ML) INJECTION
Status: DISCONTINUED | OUTPATIENT
Start: 2017-07-26 | End: 2017-07-26

## 2017-07-26 RX ORDER — HYDROMORPHONE HYDROCHLORIDE 2 MG/ML
INJECTION, SOLUTION INTRAMUSCULAR; INTRAVENOUS; SUBCUTANEOUS AS NEEDED
Status: DISCONTINUED | OUTPATIENT
Start: 2017-07-26 | End: 2017-07-26 | Stop reason: HOSPADM

## 2017-07-26 RX ORDER — ONDANSETRON 2 MG/ML
4 INJECTION INTRAMUSCULAR; INTRAVENOUS
Status: DISCONTINUED | OUTPATIENT
Start: 2017-07-26 | End: 2017-08-03 | Stop reason: ALTCHOICE

## 2017-07-26 RX ORDER — MIDAZOLAM HYDROCHLORIDE 1 MG/ML
INJECTION, SOLUTION INTRAMUSCULAR; INTRAVENOUS AS NEEDED
Status: DISCONTINUED | OUTPATIENT
Start: 2017-07-26 | End: 2017-07-26 | Stop reason: HOSPADM

## 2017-07-26 RX ORDER — LIDOCAINE HYDROCHLORIDE 10 MG/ML
0.1 INJECTION, SOLUTION EPIDURAL; INFILTRATION; INTRACAUDAL; PERINEURAL AS NEEDED
Status: DISCONTINUED | OUTPATIENT
Start: 2017-07-26 | End: 2017-07-26 | Stop reason: HOSPADM

## 2017-07-26 RX ORDER — FENTANYL CITRATE 50 UG/ML
25 INJECTION, SOLUTION INTRAMUSCULAR; INTRAVENOUS
Status: DISCONTINUED | OUTPATIENT
Start: 2017-07-26 | End: 2017-07-26 | Stop reason: HOSPADM

## 2017-07-26 RX ORDER — SODIUM CHLORIDE 9 MG/ML
250 INJECTION, SOLUTION INTRAVENOUS AS NEEDED
Status: DISCONTINUED | OUTPATIENT
Start: 2017-07-26 | End: 2017-07-26

## 2017-07-26 RX ADMIN — SUCCINYLCHOLINE CHLORIDE 100 MG: 20 INJECTION INTRAMUSCULAR; INTRAVENOUS at 21:00

## 2017-07-26 RX ADMIN — HYDROMORPHONE HYDROCHLORIDE 1 MG: 1 INJECTION, SOLUTION INTRAMUSCULAR; INTRAVENOUS; SUBCUTANEOUS at 19:13

## 2017-07-26 RX ADMIN — FENTANYL CITRATE 25 MCG: 50 INJECTION, SOLUTION INTRAMUSCULAR; INTRAVENOUS at 22:19

## 2017-07-26 RX ADMIN — ACETAMINOPHEN 1000 MG: 10 INJECTION, SOLUTION INTRAVENOUS at 21:39

## 2017-07-26 RX ADMIN — MORPHINE SULFATE 4 MG: 2 INJECTION, SOLUTION INTRAMUSCULAR; INTRAVENOUS at 18:10

## 2017-07-26 RX ADMIN — PIPERACILLIN SODIUM,TAZOBACTAM SODIUM 3.38 G: 3; .375 INJECTION, POWDER, FOR SOLUTION INTRAVENOUS at 20:19

## 2017-07-26 RX ADMIN — Medication 80 MCG: at 21:14

## 2017-07-26 RX ADMIN — FENTANYL CITRATE 25 MCG: 50 INJECTION, SOLUTION INTRAMUSCULAR; INTRAVENOUS at 22:30

## 2017-07-26 RX ADMIN — LIDOCAINE HYDROCHLORIDE 80 MG: 20 INJECTION, SOLUTION EPIDURAL; INFILTRATION; INTRACAUDAL; PERINEURAL at 21:00

## 2017-07-26 RX ADMIN — HYDROMORPHONE HYDROCHLORIDE 0.4 MG: 2 INJECTION, SOLUTION INTRAMUSCULAR; INTRAVENOUS; SUBCUTANEOUS at 20:51

## 2017-07-26 RX ADMIN — HYDROMORPHONE HYDROCHLORIDE 0.4 MG: 2 INJECTION, SOLUTION INTRAMUSCULAR; INTRAVENOUS; SUBCUTANEOUS at 20:53

## 2017-07-26 RX ADMIN — HYDROMORPHONE HYDROCHLORIDE 1 MG: 1 INJECTION, SOLUTION INTRAMUSCULAR; INTRAVENOUS; SUBCUTANEOUS at 20:19

## 2017-07-26 RX ADMIN — HYDROMORPHONE HYDROCHLORIDE 0.4 MG: 2 INJECTION, SOLUTION INTRAMUSCULAR; INTRAVENOUS; SUBCUTANEOUS at 20:40

## 2017-07-26 RX ADMIN — ONDANSETRON HYDROCHLORIDE 4 MG: 2 INJECTION, SOLUTION INTRAVENOUS at 17:30

## 2017-07-26 RX ADMIN — FENTANYL CITRATE 25 MCG: 50 INJECTION, SOLUTION INTRAMUSCULAR; INTRAVENOUS at 22:35

## 2017-07-26 RX ADMIN — ONDANSETRON 4 MG: 2 INJECTION INTRAMUSCULAR; INTRAVENOUS at 21:15

## 2017-07-26 RX ADMIN — Medication 80 MCG: at 21:08

## 2017-07-26 RX ADMIN — Medication 80 MCG: at 21:15

## 2017-07-26 RX ADMIN — HYDROMORPHONE HYDROCHLORIDE 0.4 MG: 2 INJECTION, SOLUTION INTRAMUSCULAR; INTRAVENOUS; SUBCUTANEOUS at 20:45

## 2017-07-26 RX ADMIN — Medication 80 MCG: at 21:05

## 2017-07-26 RX ADMIN — PROPOFOL 150 MG: 10 INJECTION, EMULSION INTRAVENOUS at 21:00

## 2017-07-26 RX ADMIN — ROCURONIUM BROMIDE 30 MG: 10 INJECTION, SOLUTION INTRAVENOUS at 21:07

## 2017-07-26 RX ADMIN — SODIUM CHLORIDE, SODIUM LACTATE, POTASSIUM CHLORIDE, CALCIUM CHLORIDE: 600; 310; 30; 20 INJECTION, SOLUTION INTRAVENOUS at 20:40

## 2017-07-26 RX ADMIN — PIPERACILLIN SODIUM,TAZOBACTAM SODIUM 3.38 G: 3; .375 INJECTION, POWDER, FOR SOLUTION INTRAVENOUS at 21:06

## 2017-07-26 RX ADMIN — NEOSTIGMINE METHYLSULFATE 3 MG: 1 INJECTION INTRAVENOUS at 21:55

## 2017-07-26 RX ADMIN — HYDROMORPHONE HYDROCHLORIDE 0.4 MG: 2 INJECTION, SOLUTION INTRAMUSCULAR; INTRAVENOUS; SUBCUTANEOUS at 20:56

## 2017-07-26 RX ADMIN — MIDAZOLAM HYDROCHLORIDE 2 MG: 1 INJECTION, SOLUTION INTRAMUSCULAR; INTRAVENOUS at 20:53

## 2017-07-26 RX ADMIN — DEXAMETHASONE SODIUM PHOSPHATE 8 MG: 4 INJECTION, SOLUTION INTRA-ARTICULAR; INTRALESIONAL; INTRAMUSCULAR; INTRAVENOUS; SOFT TISSUE at 21:26

## 2017-07-26 RX ADMIN — FENTANYL CITRATE 25 MCG: 50 INJECTION, SOLUTION INTRAMUSCULAR; INTRAVENOUS at 23:01

## 2017-07-26 RX ADMIN — GLYCOPYRROLATE 0.5 MG: 0.2 INJECTION INTRAMUSCULAR; INTRAVENOUS at 21:55

## 2017-07-26 RX ADMIN — FENTANYL CITRATE 25 MCG: 50 INJECTION, SOLUTION INTRAMUSCULAR; INTRAVENOUS at 22:40

## 2017-07-26 NOTE — ED NOTES
Discharge instructions given to patient by Maria Guadalupe Thrasher MD . Pt verbalized understanding of discharge instructions. Pt discharged without difficulty. Pt discharged in stable condition via ambulation, accompanied by .

## 2017-07-26 NOTE — ED PROVIDER NOTES
HPI Comments: Josephine Zarate is a 78 y.o. female with PMHx of alcohol abuse, and PSHx of hystectomy presenting per EMS to ED c/o progressive cramping mid abdominal pain since 7/21/17, worse since 1430 today. She notes associated abdominal distention and nausea. Pt denies modifying factors. She reports she was evaluated for symptoms yesterday at ED [facility] ED, was dx'd with abdominal pain, and rx'd bentyl. She states she has not yet filled the bentyl Rx. Pt denies history of abdominal issues, including stomach ulcers. She notes her most recent alcohol intake was last night. Pt denies frequent NSAID use but states \"I take Advil sometimes. \"     PCP: Irving Cavazos MD  Social Hx: current every day smoker (0.5 ppd); + EtOH (17.5 oz/week); - drug use. Pt's history is limited secondary to pt condition. Written by ALFREDO Waddell, as dictated by [employer], DO. The history is provided by the patient and the EMS personnel. Past Medical History:   Diagnosis Date    Alcohol abuse     Foot drop, left        Past Surgical History:   Procedure Laterality Date    HX HYSTERECTOMY      HX TONSILLECTOMY           History reviewed. No pertinent family history. Social History     Social History    Marital status:      Spouse name: N/A    Number of children: N/A    Years of education: N/A     Occupational History    Not on file. Social History Main Topics    Smoking status: Current Every Day Smoker     Packs/day: 0.50    Smokeless tobacco: Not on file    Alcohol use 17.5 oz/week     35 Shots of liquor per week    Drug use: No    Sexual activity: Not on file     Other Topics Concern    Not on file     Social History Narrative         ALLERGIES: Scallops    Review of Systems   Constitutional: Negative for chills, fatigue and fever. HENT: Negative for congestion and sore throat. Eyes: Negative for pain and visual disturbance.    Respiratory: Negative for cough and shortness of breath. Cardiovascular: Negative for chest pain and leg swelling. Gastrointestinal: Positive for abdominal distention, abdominal pain (mid) and nausea. Negative for blood in stool and diarrhea. Endocrine: Negative for polyuria. Genitourinary: Negative for difficulty urinating, dysuria, flank pain, vaginal bleeding and vaginal discharge. Musculoskeletal: Negative for myalgias and neck pain. Skin: Negative for rash. Allergic/Immunologic: Negative for immunocompromised state. Neurological: Negative for weakness and headaches. Psychiatric/Behavioral: Negative for dysphoric mood. All other systems reviewed and are negative. Vitals:    07/26/17 2210 07/26/17 2215 07/26/17 2220 07/26/17 2230   BP: 121/61 117/60 112/53    Pulse: (!) 104 (!) 103 (!) 101 (!) 102   Resp: 17 16 13 17   Temp:       SpO2: 94% 97% 95% 96%   Weight:       Height:                Physical Exam   Constitutional: She is oriented to person, place, and time. She appears well-developed and well-nourished. HENT:   Head: Normocephalic and atraumatic. Moist mucous membranes   Eyes: Conjunctivae are normal. Pupils are equal, round, and reactive to light. Right eye exhibits no discharge. Left eye exhibits no discharge. Neck: Normal range of motion. Neck supple. No tracheal deviation present. Cardiovascular: Normal rate, regular rhythm, normal heart sounds and intact distal pulses. No murmur heard. Pulmonary/Chest: Effort normal and breath sounds normal. No respiratory distress. She has no wheezes. She has no rales. Abdominal: Bowel sounds are normal. There is tenderness (diffusely). There is guarding. There is no rebound. Abdomen is  firm;   Musculoskeletal: Normal range of motion. She exhibits no edema, tenderness or deformity. Neurological: She is alert and oriented to person, place, and time. Skin: Skin is warm and dry. No rash noted. No erythema. Psychiatric: She has a normal mood and affect.  Her behavior is normal.   Nursing note and vitals reviewed. MDM  Number of Diagnoses or Management Options  MELBA (acute kidney injury) ([facility] [address]):   Chronic alcoholism [facility]):   Perforated viscus:   Diagnosis management comments: DDx: pancreatitis, perforated viscous, obstruction, gastritis, ACS, PE, gastric or duodenal ulcer. Acute abdomen on exam. Tried to get patient over for acute abdomen series, but was delayed, CT obtained which showed free air, surgery emergently consulted. Amount and/or Complexity of Data Reviewed  Clinical lab tests: ordered and reviewed  Tests in the radiology section of CPT®: ordered and reviewed  Tests in the medicine section of CPT®: ordered and reviewed  Obtain history from someone other than the patient: yes (EMS)  Review and summarize past medical records: yes  Discuss the patient with other providers: yes (General Surgery )  Independent visualization of images, tracings, or specimens: yes    Patient Progress  Patient progress: stable    Procedures    EKG interpretation: (Preliminary) 1736  Rhythm: sinus tachycardia; and regular . Rate (approx.): 103; Axis: normal; OH interval: normal; QRS interval: normal ; ST/T wave: normal; Other findings: non-ischemic; significant artifact. Written by Aury Sanderson, ED Scribe, as dictated by Kayla Green DO.     CONSULT NOTE:   7:32 PM  Kayla Green DO spoke with Dr. Brittany Hoffmann,   Specialty: General Surgery   Discussed pt's hx, disposition, and available diagnostic and imaging results. Reviewed care plans. Consultant agrees with plans as outlined. Dr. Brittany Hoffmann to come to the ED and evaluate the pt.    Written by Aury Sanderson ED Scribe, as dictated by Kayla Green DO.     LABORATORY TESTS:  Recent Results (from the past 12 hour(s))   EKG, 12 LEAD, INITIAL    Collection Time: 07/26/17  5:36 PM   Result Value Ref Range    Ventricular Rate 103 BPM    Atrial Rate 103 BPM    P-R Interval 122 ms    QRS Duration 116 ms    Q-T Interval 364 ms    QTC Calculation (Bezet) 476 ms    Calculated P Axis 41 degrees    Calculated R Axis 40 degrees    Calculated T Axis 61 degrees    Diagnosis       Sinus tachycardia with fusion complexes  ST & T wave abnormality, consider lateral ischemia  When compared with ECG of 25-JUL-2017 19:24,  fusion complexes are now present  QRS duration has increased  Non-specific change in ST segment in Inferior leads     CBC WITH AUTOMATED DIFF    Collection Time: 07/26/17  5:39 PM   Result Value Ref Range    WBC 14.0 (H) 3.6 - 11.0 K/uL    RBC 3.64 (L) 3.80 - 5.20 M/uL    HGB 13.0 11.5 - 16.0 g/dL    HCT 39.4 35.0 - 47.0 %    .2 (H) 80.0 - 99.0 FL    MCH 35.7 (H) 26.0 - 34.0 PG    MCHC 33.0 30.0 - 36.5 g/dL    RDW 12.6 11.5 - 14.5 %    PLATELET 599 141 - 601 K/uL    NEUTROPHILS 78 (H) 32 - 75 %    LYMPHOCYTES 18 12 - 49 %    MONOCYTES 3 (L) 5 - 13 %    EOSINOPHILS 1 0 - 7 %    BASOPHILS 0 0 - 1 %    ABS. NEUTROPHILS 11.0 (H) 1.8 - 8.0 K/UL    ABS. LYMPHOCYTES 2.5 0.8 - 3.5 K/UL    ABS. MONOCYTES 0.4 0.0 - 1.0 K/UL    ABS. EOSINOPHILS 0.1 0.0 - 0.4 K/UL    ABS. BASOPHILS 0.0 0.0 - 0.1 K/UL    RBC COMMENTS MACROCYTOSIS  1+        WBC COMMENTS TOXIC GRANULATION     LACTIC ACID    Collection Time: 07/26/17  5:39 PM   Result Value Ref Range    Lactic acid 1.9 0.4 - 2.0 MMOL/L   METABOLIC PANEL, COMPREHENSIVE    Collection Time: 07/26/17  5:39 PM   Result Value Ref Range    Sodium 136 136 - 145 mmol/L    Potassium 3.8 3.5 - 5.1 mmol/L    Chloride 102 97 - 108 mmol/L    CO2 24 21 - 32 mmol/L    Anion gap 10 5 - 15 mmol/L    Glucose 161 (H) 65 - 100 mg/dL    BUN 31 (H) 6 - 20 MG/DL    Creatinine 1.86 (H) 0.55 - 1.02 MG/DL    BUN/Creatinine ratio 17 12 - 20      GFR est AA 33 (L) >60 ml/min/1.73m2    GFR est non-AA 27 (L) >60 ml/min/1.73m2    Calcium 8.8 8.5 - 10.1 MG/DL    Bilirubin, total 0.4 0.2 - 1.0 MG/DL    ALT (SGPT) 34 12 - 78 U/L    AST (SGOT) 33 15 - 37 U/L    Alk.  phosphatase 90 45 - 117 U/L    Protein, total 7.6 6.4 - 8.2 g/dL    Albumin 3.6 3.5 - 5.0 g/dL    Globulin 4.0 2.0 - 4.0 g/dL    A-G Ratio 0.9 (L) 1.1 - 2.2     LIPASE    Collection Time: 07/26/17  5:39 PM   Result Value Ref Range    Lipase 253 73 - 393 U/L   TROPONIN I    Collection Time: 07/26/17  5:39 PM   Result Value Ref Range    Troponin-I, Qt. <0.04 <0.05 ng/mL   TYPE & CROSSMATCH    Collection Time: 07/26/17  8:09 PM   Result Value Ref Range    Crossmatch Expiration 07/29/2017     ABO/Rh(D) A POSITIVE     Antibody screen NEG     Unit number [id number]     Blood component type RC LR AS1     Unit division 00     Status of unit ALLOCATED     Crossmatch result Compatible     Unit number [id number]     Blood component type RC LR AS1     Unit division 00     Status of unit ALLOCATED     Crossmatch result Compatible    PTT    Collection Time: 07/26/17  8:09 PM   Result Value Ref Range    aPTT 25.9 22.1 - 32.5 sec    aPTT, therapeutic range     58.0 - 77.0 SECS       IMAGING RESULTS:  INDICATION: Abdominal pain question dissection     COMPARISON: July 25, 2017     TECHNIQUE: Unenhanced CT of the chest abdomen pelvis. . Sagittal and coronal  reformats were performed. CT dose reduction was achieved through use of a  standardized protocol tailored for this examination and automatic exposure  control for dose modulation.     FINDINGS:     THYROID: No nodule. MEDIASTINUM: No mass or lymphadenopathy. DEENA: No mass or lymphadenopathy. THORACIC AORTA: Mild atherosclerosis. No obvious dissection. No aneurysm  MAIN PULMONARY ARTERY: Normal in caliber. TRACHEA/BRONCHI: Patent. ESOPHAGUS: No wall thickening or dilatation. HEART: Normal in size. PLEURA: No effusion or pneumothorax. LUNGS: No nodule, mass, or airspace disease. LIVER: No mass or biliary dilatation. GALLBLADDER: Unremarkable. SPLEEN: No mass. PANCREAS: No mass or ductal dilatation. ADRENALS: Unremarkable. KIDNEYS: There is retained contrast within the kidneys. Question ATN.  No  hydronephrosis  STOMACH: Possible ulceration of the distal stomach  SMALL BOWEL: No dilatation or wall thickening. COLON: There is diverticulosis of the sigmoid colon. No evidence of acute  diverticulitis  APPENDIX: Unremarkable. PERITONEUM: There is a large volume of free air in the upper abdomen new since  the prior study  RETROPERITONEUM: No lymphadenopathy or aortic aneurysm. REPRODUCTIVE ORGANS: Uterus is surgically absent  URINARY BLADDER: No mass or calculus. BONES: No destructive bone lesion. ADDITIONAL COMMENTS: N/A     IMPRESSION:     1. Free air in the upper abdomen compatible with bowel perforation possibly from  the stomach  2. Persistent enhancement of the kidneys concerning for ATN     The findings were called to Dr. Nydia García on 7/26/2017 at 1920 by Dr. Reji Ward. [id number]                  INDICATION: Abdominal pain question dissection     COMPARISON: July 25, 2017     TECHNIQUE: Unenhanced CT of the chest abdomen pelvis. . Sagittal and coronal  reformats were performed. CT dose reduction was achieved through use of a  standardized protocol tailored for this examination and automatic exposure  control for dose modulation.     FINDINGS:     THYROID: No nodule. MEDIASTINUM: No mass or lymphadenopathy. DEENA: No mass or lymphadenopathy. THORACIC AORTA: Mild atherosclerosis. No obvious dissection. No aneurysm  MAIN PULMONARY ARTERY: Normal in caliber. TRACHEA/BRONCHI: Patent. ESOPHAGUS: No wall thickening or dilatation. HEART: Normal in size. PLEURA: No effusion or pneumothorax. LUNGS: No nodule, mass, or airspace disease. LIVER: No mass or biliary dilatation. GALLBLADDER: Unremarkable. SPLEEN: No mass. PANCREAS: No mass or ductal dilatation. ADRENALS: Unremarkable. KIDNEYS: There is retained contrast within the kidneys. Question ATN. No  hydronephrosis  STOMACH: Possible ulceration of the distal stomach  SMALL BOWEL: No dilatation or wall thickening. COLON: There is diverticulosis of the sigmoid colon.  No evidence of acute  diverticulitis  APPENDIX: Unremarkable. PERITONEUM: There is a large volume of free air in the upper abdomen new since  the prior study  RETROPERITONEUM: No lymphadenopathy or aortic aneurysm. REPRODUCTIVE ORGANS: Uterus is surgically absent  URINARY BLADDER: No mass or calculus. BONES: No destructive bone lesion. ADDITIONAL COMMENTS: N/A     IMPRESSION:     1. Free air in the upper abdomen compatible with bowel perforation possibly from  the stomach  2. Persistent enhancement of the kidneys concerning for ATN     The findings were called to Dr. Elmer Kelly on 7/26/2017 at 1920 by Dr. Cullen Res. [id number]                  MEDICATIONS GIVEN:  Medications   piperacillin-tazobactam (ZOSYN) 3.375 g in 0.9% sodium chloride (MBP/ADV) 100 mL (3.375 g IntraVENous New Bag 7/26/17 2106)   lactated Ringers infusion (not administered)   fentaNYL citrate (PF) injection 25 mcg (25 mcg IntraVENous Given 7/26/17 2230)   HYDROmorphone (PF) (DILAUDID) injection 0.2 mg (not administered)   diphenhydrAMINE (BENADRYL) injection 12.5 mg (not administered)   meperidine (DEMEROL) injection 12.5 mg (not administered)   0.9% sodium chloride infusion (not administered)   LORazepam (ATIVAN) injection 2 mg (not administered)   LORazepam (ATIVAN) injection 2 mg (not administered)   morphine injection 4 mg (4 mg IntraVENous Given 7/26/17 1810)   ondansetron (ZOFRAN) injection 4 mg (4 mg IntraVENous Given 7/26/17 1730)   HYDROmorphone (PF) (DILAUDID) injection 1 mg (1 mg IntraVENous Given 7/26/17 1913)   piperacillin-tazobactam (ZOSYN) 3.375 g in 0.9% sodium chloride (MBP/ADV) 100 mL (3.375 g IntraVENous New Bag 7/26/17 2019)   HYDROmorphone (PF) (DILAUDID) injection 1 mg (1 mg IntraVENous Given 7/26/17 2019)       IMPRESSION:  1. Perforated viscus    2. Chronic alcoholism ([facility] [address]    3. MELBA (acute kidney injury) ([facility] [address]        PLAN:  1. Admit to General Surgery. 7:44 PM  Patient is being admitted to the hospital by Dr. Jaleel Palma.   The results of their tests and reasons for their admission have been discussed with them and/or available family. They convey agreement and understanding for the need to be admitted and for their admission diagnosis. Consultation has been made with the inpatient physician specialist for hospitalization. Written by ALFREDO Rueda, as dictated by Inés Chan DO. This note is prepared by Levon Bani, acting as Scribe for Inés Chan DO. Inés Chan DO: The scribe's documentation has been prepared under my direction and personally reviewed by me in its entirety. I confirm that the note above accurately reflects all work, treatment, procedures, and medical decision making performed by me.

## 2017-07-26 NOTE — PROGRESS NOTES
Pharmacy Automatic Renal Dosing Protocol - Antimicrobials      Indication for Antimicrobials: Intra Abdominal Infection  Current Regimen of Each Antimicrobial (Start Day & Day of Therapy):  Piperacillin Tazobactam 3.375 grams Q8H ( day 1)    Significant cultures: ND      CAPD, Intermittent Hemodialysis or Renal Replacement Therapy: No  Paralysis, amputations, malnutrition: No  Recent Labs      17   1739  17   1647   CREA  1.86*  1.04*   BUN  31*  29*   WBC  14.0*  11.1*     Temp (24hrs), Av.2 °F (36.8 °C), Min:98.2 °F (36.8 °C), Max:98.2 °F (36.8 °C)    Creatinine Clearance (ml/min): 24       Impression/Plan: Continue present dose of piperacillin tazobactam.        Pharmacy will follow daily and adjust doses of monitored medications as appropriate for renal function and/or serum levels.     Thank you,  Sameer Thrasher, [facility]

## 2017-07-26 NOTE — IP AVS SNAPSHOT
[address] 
[phone] Patient: Nemo Florian MRN: [id number] VT You are allergic to the following Allergen Reactions Scallops Hives Recent Documentation Height Weight BMI Smoking Status 1.6 m 76.5 kg 29.88 kg/m2 Current Every Day Smoker Emergency Contacts Name Discharge Info Relation Home Work Mobile Alfonso Chavez  Spouse [3] [phone] About your hospitalization You were admitted on:  2017 You last received care in the:  [facility] 2 GENERAL SURGERY You were discharged on:  2017 Unit phone number:  [phone] Why you were hospitalized Your primary diagnosis was:  Perforated Viscus Your diagnoses also included:  Chronic Alcoholism (Hcc), Axel (Acute Kidney Injury) (Hcc) Providers Seen During Your Hospitalizations Provider Role Specialty Primary office phone Lorraine Carr DO Attending Provider Emergency Medicine [phone] Zain Thompson MD Attending Provider General Surgery [phone] Your Primary Care Physician (PCP) Primary Care Physician Office Phone Office Fax OTHER, PHYS ** None ** ** None ** Follow-up Information Follow up With Details Comments Contact Info Donis Neil MD On 2017 APPOINTMENT TIME: 1:00PM [place] [address]. 
[phone] [facility] On 2017 THIS IS YOUR HOME HEALTH AGENCY, IF YOU DO NOT HEAR FROM THEM WITHIN 24-48 HOURS PLEASE CONTACT THEM DIRECTLY [address] 
1st Floor [facility] [address] 
[phone] Current Discharge Medication List  
  
START taking these medications Dose & Instructions Dispensing Information Comments Morning Noon Evening Bedtime  
 amoxicillin 500 mg capsule Commonly known as:  AMOXIL Your last dose was: Your next dose is:    
   
   
 Dose:  1000 mg Take 2 Caps by mouth every twelve (12) hours for 14 days. Quantity:  56 Cap Refills:  0  
     
   
   
   
  
 clarithromycin 500 mg tablet Commonly known as:  Nicanor Elida Your last dose was: Your next dose is:    
   
   
 Dose:  500 mg Take 1 Tab by mouth every twelve (12) hours for 14 days. Quantity:  28 Tab Refills:  0 HYDROcodone-acetaminophen 5-325 mg per tablet Commonly known as:  Jasmyne Rishabh Your last dose was: Your next dose is:    
   
   
 Dose:  1-2 Tab Take 1-2 Tabs by mouth every four (4) hours as needed. Max Daily Amount: 12 Tabs. Quantity:  30 Tab Refills:  0  
     
   
   
   
  
 pantoprazole 40 mg tablet Commonly known as:  PROTONIX Your last dose was: Your next dose is:    
   
   
 Dose:  40 mg Take 1 Tab by mouth two (2) times a day. Quantity:  60 Tab Refills:  1 CONTINUE these medications which have NOT CHANGED Dose & Instructions Dispensing Information Comments Morning Noon Evening Bedtime ALEVE 220 mg tablet Generic drug:  naproxen sodium Your last dose was: Your next dose is:    
   
   
 Dose:  440 mg Take 440 mg by mouth two (2) times daily as needed for Pain. Refills:  0  
     
   
   
   
  
 dicyclomine 20 mg tablet Commonly known as:  BENTYL Your last dose was: Your next dose is:    
   
   
 Dose:  20 mg Take 1 Tab by mouth every six (6) hours as needed (abdominal cramps). Quantity:  20 Tab Refills:  0  
     
   
   
   
  
 lisinopril-hydroCHLOROthiazide 20-12.5 mg per tablet Commonly known as:  Morales Perez Your last dose was: Your next dose is:    
   
   
 Dose:  1 Tab Take 1 Tab by mouth daily. Refills:  0  
     
   
   
   
  
 loperamide 1 mg/5 mL solution Commonly known as:  IMODIUM Your last dose was: Your next dose is:    
   
   
 Dose:  1 tsp Take 1 tsp by mouth daily. Refills:  0  
     
   
   
   
  
 multivitamin tablet Commonly known as:  ONE A DAY Your last dose was: Your next dose is:    
   
   
 Dose:  2 Tab Take 2 Tabs by mouth daily. Refills:  0  
     
   
   
   
  
 nicotine 14 mg/24 hr patch Commonly known as:  Kennyth Lewis Your last dose was: Your next dose is:    
   
   
 Dose:  1 Patch 1 Patch by TransDERmal route five (5) days a week. Monday through Friday as the patient is not allowed to smoke at work Refills:  0  
     
   
   
   
  
 THERATEARS 0.25 % Dpet Generic drug:  carboxymethylcellulose sodium Your last dose was: Your next dose is:    
   
   
 Dose:  1-2 Drop Apply 1-2 Drops to eye as needed (Dry eye). Refills:  0 STOP taking these medications   
 acetaminophen-codeine 300-30 mg per tablet Commonly known as:  TYLENOL-CODEINE #3  
   
  
 raNITIdine 150 mg tablet Commonly known as:  ZANTAC Where to Get Your Medications Information on where to get these meds will be given to you by the nurse or doctor. ! Ask your nurse or doctor about these medications  
  amoxicillin 500 mg capsule  
 clarithromycin 500 mg tablet HYDROcodone-acetaminophen 5-325 mg per tablet  
 pantoprazole 40 mg tablet Discharge Instructions Peptic Ulcer Disease: Care Instructions Your Care Instructions Peptic ulcers are sores on the inside of the stomach or the small intestine. They are usually caused by an infection with bacteria or from use of nonsteroidal anti-inflammatory drugs (NSAIDs). NSAIDs include aspirin, ibuprofen (Advil), and naproxen (Aleve). Your doctor may have prescribed medicine to reduce stomach acid.  You also may need to take antibiotics if your peptic ulcers are caused by an infection. You can help your stomach heal and keep ulcers from coming back by making some changes in your lifestyle. Quit smoking, limit caffeine and alcohol, and reduce stress. Follow-up care is a key part of your treatment and safety. Be sure to make and go to all appointments, and call your doctor if you are having problems. Its also a good idea to know your test results and keep a list of the medicines you take. How can you care for yourself at home? · Take your medicines exactly as prescribed. Call your doctor if you think you are having a problem with your medicine. · Do not take aspirin or other NSAIDs such as ibuprofen (Advil or Motrin) or naproxen (Aleve). Ask your doctor what you can take for pain. · Do not smoke. Smoking can make ulcers worse. If you need help quitting, talk to your doctor about stop-smoking programs and medicines. These can increase your chances of quitting for good. · Drink in moderation or avoid drinking alcohol. · Do not drink beverages that have caffeine if they bother your stomach. These include coffee, tea, and soda. · Eat a balanced diet of small, frequent meals. Make an appointment with a dietitian if you need help planning your meals. · Reduce stress. Avoid people and places that make you feel anxious, if you can. Learn ways to reduce stress, such as biofeedback, guided imagery, and meditation. When should you call for help? Call 911 anytime you think you may need emergency care. For example, call if: 
· You passed out (lost consciousness). · You vomit blood or what looks like coffee grounds. · You pass maroon or very bloody stools. Call your doctor now or seek immediate medical care if: 
· You have severe pain in your belly, back, or shoulders. · You have new or worsening belly pain. · You are dizzy or lightheaded, or you feel like you may faint. · Your stools are black and tarlike or have streaks of blood. Watch closely for changes in your health, and be sure to contact your doctor if: 
· You have new symptoms such as weight loss, nausea or vomiting. · You do not feel better as expected. Where can you learn more? Go to http://gemini-feliciano.info/. Enter X848 in the search box to learn more about \"Peptic Ulcer Disease: Care Instructions. \" Current as of: August 9, 2016 Content Version: 11.3 © 5763-6778 [employer]WARE. Care instructions adapted under license by [employer] (which disclaims liability or warranty for this information). If you have questions about a medical condition or this instruction, always ask your healthcare professional. [place] [address] any warranty or liability for your use of this information. H. Pylori Bacterial Infection: Care Instructions Your Care Instructions Your test shows the presence of Helicobacter pylori (H. pylori), a kind of bacterium that lives in the lining of the stomach. Many people have H. pylori in their stomachs and do not develop problems. But sometimes H. pylori causes an upset stomach or a sore (ulcer) in the stomach lining. Most stomach ulcers are caused by H. pylori. Symptoms of an ulcer include gnawing or burning pain in the belly that can last minutes or hours. Eating food or taking antacids helps relieve the pain, but the symptoms may come back after a while. Antibiotic medicine can cure an H. pylori infection. Follow-up care is a key part of your treatment and safety. Be sure to make and go to all appointments, and call your doctor if you are having problems. Its also a good idea to know your test results and keep a list of the medicines you take. How can you care for yourself at home? · Take your antibiotics as directed. Do not stop taking them just because you feel better. You need to take the full course of antibiotics. · If your doctor prescribes other medicine, take it exactly as prescribed. Call your doctor if you think you are having a problem with your medicine. You will get more details on the specific medicine your doctor prescribes. · Eat a healthy, balanced diet. ¨ Eat smaller meals, and eat more often. Be sure to eat at least three meals a day. ¨ Avoid heavily spiced or greasy foods. ¨ Do not drink beverages that have caffeine if they bother your stomach. These include coffee, tea, and soda. · Do not smoke. Smoking slows the healing of your ulcer and can make an ulcer come back. If you need help quitting, talk to your doctor about stop-smoking programs and medicines. These can increase your chances of quitting for good. · Limit how much alcohol you drink. Alcohol can slow healing of an ulcer and can make your symptoms worse. · Wash your hands after going to the bathroom. · Avoid aspirin, ibuprofen, or other anti-inflammatory medicines, because they can irritate the stomach. If you need pain medicine, try acetaminophen (Tylenol). When should you call for help? Call 911 anytime you think you may need emergency care. For example, call if: 
· You passed out (lost consciousness). · You vomit blood or what looks like coffee grounds. · You pass maroon or very bloody stools. Call your doctor now or seek immediate medical care if: 
· You have severe pain in your belly, back, or shoulders. · You have new or worsening belly pain. · You are dizzy or lightheaded, or you feel like you may faint. · Your stools are black and tarlike or have streaks of blood. Watch closely for changes in your health, and be sure to contact your doctor if: 
· You have new symptoms such as weight loss, nausea, or vomiting. · You do not feel better as expected. Where can you learn more? Go to http://gemini-feliciano.info/. Enter R436 in the search box to learn more about \"H.  Pylori Bacterial Infection: Care Instructions. \" Current as of: November 21, 2016 Content Version: 11.3 © 3685-8946 [employer]. Care instructions adapted under license by [employer] (which disclaims liability or warranty for this information). If you have questions about a medical condition or this instruction, always ask your healthcare professional. [address]  any warranty or liability for your use of this information. Discharge Orders None GeoMetWatch Announcement We are excited to announce that we are making your provider's discharge notes available to you in GeoMetWatch. You will see these notes when they are completed and signed by the physician that discharged you from your recent hospital stay. If you have any questions or concerns about any information you see in GeoMetWatch, please call the Health Information Department where you were seen or reach out to your Primary Care Provider for more information about your plan of care. Introducing [facility]! Hawa Ferrer introduces GeoMetWatch patient portal. Now you can access parts of your medical record, email your doctor's office, and request medication refills online. 1. In your internet browser, go to [email or web address] 2. Click on the First Time User? Click Here link in the Sign In box. You will see the New Member Sign Up page. 3. Enter your GeoMetWatch Access Code exactly as it appears below. You will not need to use this code after youve completed the sign-up process. If you do not sign up before the expiration date, you must request a new code. · GeoMetWatch Access Code: [email or web address] Expires: 10/23/2017  7:59 PM 
 
4. Enter the last four digits of your Social Security Number (xxxx) and Date of Birth (mm/dd/yyyy) as indicated and click Submit. You will be taken to the next sign-up page. 5. Create a GeoMetWatch ID.  This will be your GeoMetWatch login ID and cannot be changed, so think of one that is secure and easy to remember. 6. Create a Moncai password. You can change your password at any time. 7. Enter your Password Reset Question and Answer. This can be used at a later time if you forget your password. 8. Enter your e-mail address. You will receive e-mail notification when new information is available in [address] 9. Click Sign Up. You can now view and download portions of your medical record. 10. Click the Download Summary menu link to download a portable copy of your medical information. If you have questions, please visit the Frequently Asked Questions section of the Moncai website. Remember, Moncai is NOT to be used for urgent needs. For medical emergencies, dial 911. Now available from your iPhone and Android! General Information Please provide this summary of care documentation to your next provider. Patient Signature:  ____________________________________________________________ Date:  ____________________________________________________________  
  
Lasha Marion Provider Signature:  ____________________________________________________________ Date:  ____________________________________________________________

## 2017-07-27 LAB
ALBUMIN SERPL BCP-MCNC: 3 G/DL (ref 3.5–5)
ALBUMIN/GLOB SERPL: 0.8 {RATIO} (ref 1.1–2.2)
ALP SERPL-CCNC: 69 U/L (ref 45–117)
ALT SERPL-CCNC: 29 U/L (ref 12–78)
ANION GAP BLD CALC-SCNC: 6 MMOL/L (ref 5–15)
AST SERPL W P-5'-P-CCNC: 25 U/L (ref 15–37)
ATRIAL RATE: 103 BPM
BACTERIA SPEC CULT: ABNORMAL
BACTERIA SPEC CULT: ABNORMAL
BASOPHILS # BLD AUTO: 0 K/UL (ref 0–0.1)
BASOPHILS # BLD: 0 % (ref 0–1)
BILIRUB SERPL-MCNC: 0.6 MG/DL (ref 0.2–1)
BUN SERPL-MCNC: 29 MG/DL (ref 6–20)
BUN/CREAT SERPL: 20 (ref 12–20)
CALCIUM SERPL-MCNC: 8.2 MG/DL (ref 8.5–10.1)
CALCULATED P AXIS, ECG09: 41 DEGREES
CALCULATED R AXIS, ECG10: 40 DEGREES
CALCULATED T AXIS, ECG11: 61 DEGREES
CC UR VC: ABNORMAL
CHLORIDE SERPL-SCNC: 105 MMOL/L (ref 97–108)
CO2 SERPL-SCNC: 25 MMOL/L (ref 21–32)
CREAT SERPL-MCNC: 1.42 MG/DL (ref 0.55–1.02)
DIAGNOSIS, 93000: NORMAL
EOSINOPHIL # BLD: 0 K/UL (ref 0–0.4)
EOSINOPHIL NFR BLD: 0 % (ref 0–7)
ERYTHROCYTE [DISTWIDTH] IN BLOOD BY AUTOMATED COUNT: 12.7 % (ref 11.5–14.5)
GLOBULIN SER CALC-MCNC: 3.8 G/DL (ref 2–4)
GLUCOSE SERPL-MCNC: 159 MG/DL (ref 65–100)
HCT VFR BLD AUTO: 37.1 % (ref 35–47)
HGB BLD-MCNC: 12.3 G/DL (ref 11.5–16)
INR BLD: 0.9 (ref 0.9–1.2)
LYMPHOCYTES # BLD AUTO: 8 % (ref 12–49)
LYMPHOCYTES # BLD: 0.9 K/UL (ref 0.8–3.5)
MCH RBC QN AUTO: 36 PG (ref 26–34)
MCHC RBC AUTO-ENTMCNC: 33.2 G/DL (ref 30–36.5)
MCV RBC AUTO: 108.5 FL (ref 80–99)
MONOCYTES # BLD: 0.3 K/UL (ref 0–1)
MONOCYTES NFR BLD AUTO: 3 % (ref 5–13)
NEUTS SEG # BLD: 10.4 K/UL (ref 1.8–8)
NEUTS SEG NFR BLD AUTO: 89 % (ref 32–75)
P-R INTERVAL, ECG05: 122 MS
PLATELET # BLD AUTO: 178 K/UL (ref 150–400)
POTASSIUM SERPL-SCNC: 4.4 MMOL/L (ref 3.5–5.1)
PROT SERPL-MCNC: 6.8 G/DL (ref 6.4–8.2)
Q-T INTERVAL, ECG07: 364 MS
QRS DURATION, ECG06: 116 MS
QTC CALCULATION (BEZET), ECG08: 476 MS
RBC # BLD AUTO: 3.42 M/UL (ref 3.8–5.2)
RBC MORPH BLD: ABNORMAL
SERVICE CMNT-IMP: ABNORMAL
SODIUM SERPL-SCNC: 136 MMOL/L (ref 136–145)
VENTRICULAR RATE, ECG03: 103 BPM
WBC # BLD AUTO: 11.6 K/UL (ref 3.6–11)
WBC MORPH BLD: ABNORMAL

## 2017-07-27 PROCEDURE — 74011000250 HC RX REV CODE- 250: Performed by: SURGERY

## 2017-07-27 PROCEDURE — 65270000029 HC RM PRIVATE

## 2017-07-27 PROCEDURE — 74011000250 HC RX REV CODE- 250: Performed by: HOSPITALIST

## 2017-07-27 PROCEDURE — 86677 HELICOBACTER PYLORI ANTIBODY: CPT | Performed by: SURGERY

## 2017-07-27 PROCEDURE — 77030027138 HC INCENT SPIROMETER -A

## 2017-07-27 PROCEDURE — 36415 COLL VENOUS BLD VENIPUNCTURE: CPT | Performed by: SURGERY

## 2017-07-27 PROCEDURE — 74011000258 HC RX REV CODE- 258: Performed by: SURGERY

## 2017-07-27 PROCEDURE — 74011250636 HC RX REV CODE- 250/636: Performed by: SURGERY

## 2017-07-27 PROCEDURE — 88305 TISSUE EXAM BY PATHOLOGIST: CPT | Performed by: SURGERY

## 2017-07-27 PROCEDURE — 80053 COMPREHEN METABOLIC PANEL: CPT | Performed by: SURGERY

## 2017-07-27 PROCEDURE — 65660000000 HC RM CCU STEPDOWN

## 2017-07-27 PROCEDURE — 77010033678 HC OXYGEN DAILY

## 2017-07-27 PROCEDURE — 85025 COMPLETE CBC W/AUTO DIFF WBC: CPT | Performed by: SURGERY

## 2017-07-27 PROCEDURE — C9113 INJ PANTOPRAZOLE SODIUM, VIA: HCPCS | Performed by: SURGERY

## 2017-07-27 PROCEDURE — 74011250636 HC RX REV CODE- 250/636: Performed by: HOSPITALIST

## 2017-07-27 RX ORDER — LORAZEPAM 2 MG/ML
2 INJECTION INTRAMUSCULAR
Status: DISCONTINUED | OUTPATIENT
Start: 2017-07-27 | End: 2017-07-30

## 2017-07-27 RX ORDER — LORAZEPAM 2 MG/ML
1 INJECTION INTRAMUSCULAR EVERY 8 HOURS
Status: DISCONTINUED | OUTPATIENT
Start: 2017-07-27 | End: 2017-08-01

## 2017-07-27 RX ORDER — LORAZEPAM 2 MG/ML
1 INJECTION INTRAMUSCULAR
Status: DISCONTINUED | OUTPATIENT
Start: 2017-07-27 | End: 2017-07-30

## 2017-07-27 RX ADMIN — SODIUM CHLORIDE 125 ML/HR: 900 INJECTION, SOLUTION INTRAVENOUS at 20:04

## 2017-07-27 RX ADMIN — HYDROMORPHONE HYDROCHLORIDE 0.5 MG: 1 INJECTION, SOLUTION INTRAMUSCULAR; INTRAVENOUS; SUBCUTANEOUS at 13:11

## 2017-07-27 RX ADMIN — HYDROMORPHONE HYDROCHLORIDE 0.5 MG: 1 INJECTION, SOLUTION INTRAMUSCULAR; INTRAVENOUS; SUBCUTANEOUS at 02:30

## 2017-07-27 RX ADMIN — HYDROMORPHONE HYDROCHLORIDE 0.5 MG: 1 INJECTION, SOLUTION INTRAMUSCULAR; INTRAVENOUS; SUBCUTANEOUS at 10:42

## 2017-07-27 RX ADMIN — FOLIC ACID: 5 INJECTION, SOLUTION INTRAMUSCULAR; INTRAVENOUS; SUBCUTANEOUS at 11:14

## 2017-07-27 RX ADMIN — Medication 10 ML: at 00:08

## 2017-07-27 RX ADMIN — Medication 10 ML: at 21:52

## 2017-07-27 RX ADMIN — HYDROMORPHONE HYDROCHLORIDE 0.5 MG: 1 INJECTION, SOLUTION INTRAMUSCULAR; INTRAVENOUS; SUBCUTANEOUS at 20:24

## 2017-07-27 RX ADMIN — SODIUM CHLORIDE 40 MG: 9 INJECTION INTRAMUSCULAR; INTRAVENOUS; SUBCUTANEOUS at 08:53

## 2017-07-27 RX ADMIN — HYDROMORPHONE HYDROCHLORIDE 0.5 MG: 1 INJECTION, SOLUTION INTRAMUSCULAR; INTRAVENOUS; SUBCUTANEOUS at 17:49

## 2017-07-27 RX ADMIN — Medication 10 ML: at 13:35

## 2017-07-27 RX ADMIN — PIPERACILLIN SODIUM,TAZOBACTAM SODIUM 3.38 G: 3; .375 INJECTION, POWDER, FOR SOLUTION INTRAVENOUS at 04:37

## 2017-07-27 RX ADMIN — LORAZEPAM 1 MG: 2 INJECTION INTRAMUSCULAR; INTRAVENOUS at 13:30

## 2017-07-27 RX ADMIN — SODIUM CHLORIDE 40 MG: 9 INJECTION INTRAMUSCULAR; INTRAVENOUS; SUBCUTANEOUS at 00:08

## 2017-07-27 RX ADMIN — HYDROMORPHONE HYDROCHLORIDE 0.5 MG: 1 INJECTION, SOLUTION INTRAMUSCULAR; INTRAVENOUS; SUBCUTANEOUS at 00:03

## 2017-07-27 RX ADMIN — LORAZEPAM 1 MG: 2 INJECTION INTRAMUSCULAR; INTRAVENOUS at 21:52

## 2017-07-27 RX ADMIN — Medication 10 ML: at 02:30

## 2017-07-27 RX ADMIN — PIPERACILLIN SODIUM,TAZOBACTAM SODIUM 3.38 G: 3; .375 INJECTION, POWDER, FOR SOLUTION INTRAVENOUS at 12:36

## 2017-07-27 RX ADMIN — SODIUM CHLORIDE 40 MG: 9 INJECTION INTRAMUSCULAR; INTRAVENOUS; SUBCUTANEOUS at 20:28

## 2017-07-27 RX ADMIN — SODIUM CHLORIDE 125 ML/HR: 900 INJECTION, SOLUTION INTRAVENOUS at 00:00

## 2017-07-27 RX ADMIN — HYDROMORPHONE HYDROCHLORIDE 0.5 MG: 1 INJECTION, SOLUTION INTRAMUSCULAR; INTRAVENOUS; SUBCUTANEOUS at 08:15

## 2017-07-27 RX ADMIN — HYDROMORPHONE HYDROCHLORIDE 0.5 MG: 1 INJECTION, SOLUTION INTRAMUSCULAR; INTRAVENOUS; SUBCUTANEOUS at 05:35

## 2017-07-27 RX ADMIN — Medication 10 ML: at 20:05

## 2017-07-27 RX ADMIN — PIPERACILLIN SODIUM,TAZOBACTAM SODIUM 3.38 G: 3; .375 INJECTION, POWDER, FOR SOLUTION INTRAVENOUS at 20:32

## 2017-07-27 NOTE — H&P
Surgery History and Physcial    Subjective:      Mi Vallecillo is a 78 y.o. female who presents for evaluation of abdominal pain. The pain is located in the diffuse upper apbdomen without radiation. Pain is described as sharp and stabbing and measures 10/10 in intensity. Onset of pain was 5 days ago but became severe yesterday and worse today. Aggravating factors include movement. Alleviating factors include none. Associated symptoms include anorexia, nausea and vomiting. Patient Active Problem List    Diagnosis Date Noted    Perforated viscus 07/26/2017    Chronic alcoholism ([facility] 07/26/2017    MELBA (acute kidney injury) ([facility] 07/26/2017     Past Medical History:   Diagnosis Date    Alcohol abuse     Foot drop, left       Past Surgical History:   Procedure Laterality Date    HX HYSTERECTOMY      HX TONSILLECTOMY        Social History   Substance Use Topics    Smoking status: Current Every Day Smoker     Packs/day: 0.50    Smokeless tobacco: Not on file    Alcohol use 17.5 oz/week     35 Shots of liquor per week      No family history on file. Prior to Admission medications    Medication Sig Start Date End Date Taking? Authorizing Provider   raNITIdine (ZANTAC) 150 mg tablet Take 1 Tab by mouth two (2) times a day for 10 days. 7/25/17 8/4/17  Natali Martinez MD   dicyclomine (BENTYL) 20 mg tablet Take 1 Tab by mouth every six (6) hours as needed (abdominal cramps). 7/25/17   Natali Martinez MD   acetaminophen-codeine (TYLENOL-CODEINE #3) 300-30 mg per tablet Take 1-2 Tabs by mouth every six (6) hours as needed for Pain. Max Daily Amount: 8 Tabs. 7/25/17   Natali Martinez MD   PHENAZOPYRIDINE HCL (AZO PO) Take  by mouth as needed. Irving Cavazos MD   GUAIFENESIN (MUCINEX PO) Take  by mouth as needed. Irving Cavazos MD     Allergies   Allergen Reactions    Scallops Hives         Review of Systems   Constitutional: Positive for appetite change. Negative for chills, diaphoresis and fever. Respiratory: Negative for shortness of breath and wheezing. Cardiovascular: Negative for chest pain and palpitations. Gastrointestinal: Positive for abdominal pain, nausea and vomiting. Negative for blood in stool and diarrhea. Musculoskeletal: Negative for myalgias. Hematological: Does not bruise/bleed easily. Objective:     Visit Vitals    /80 (BP 1 Location: Left arm, BP Patient Position: At rest)    Pulse (!) 107    Temp 98.2 °F (36.8 °C)    Resp 18    Ht 5' 3\" (1.6 m)    Wt 167 lb 1.7 oz (75.8 kg)    SpO2 95%    BMI 29.6 kg/m2       Physical Exam   Constitutional: She appears well-developed and well-nourished. No distress. HENT:   Head: Normocephalic and atraumatic. Cardiovascular: Normal rate, regular rhythm, normal heart sounds and intact distal pulses. Pulmonary/Chest: Breath sounds normal. She has no wheezes. She has no rales. Abdominal: Soft. Bowel sounds are normal. She exhibits no distension and no mass. There is generalized tenderness. There is rigidity, rebound and guarding. There is no tenderness at McBurney's point and negative Schrader's sign. No hernia. Musculoskeletal: Normal range of motion. Lymphadenopathy:     She has no cervical adenopathy.        Imaging:  images and reports reviewed    Lab Review:    Recent Results (from the past 24 hour(s))   EKG, 12 LEAD, INITIAL    Collection Time: 07/26/17  5:36 PM   Result Value Ref Range    Ventricular Rate 103 BPM    Atrial Rate 103 BPM    P-R Interval 122 ms    QRS Duration 116 ms    Q-T Interval 364 ms    QTC Calculation (Bezet) 476 ms    Calculated P Axis 41 degrees    Calculated R Axis 40 degrees    Calculated T Axis 61 degrees    Diagnosis       Sinus tachycardia with fusion complexes  ST & T wave abnormality, consider lateral ischemia  When compared with ECG of 25-JUL-2017 19:24,  fusion complexes are now present  QRS duration has increased  Non-specific change in ST segment in Inferior leads     CBC WITH AUTOMATED DIFF    Collection Time: 07/26/17  5:39 PM   Result Value Ref Range    WBC 14.0 (H) 3.6 - 11.0 K/uL    RBC 3.64 (L) 3.80 - 5.20 M/uL    HGB 13.0 11.5 - 16.0 g/dL    HCT 39.4 35.0 - 47.0 %    .2 (H) 80.0 - 99.0 FL    MCH 35.7 (H) 26.0 - 34.0 PG    MCHC 33.0 30.0 - 36.5 g/dL    RDW 12.6 11.5 - 14.5 %    PLATELET 002 344 - 887 K/uL    NEUTROPHILS 78 (H) 32 - 75 %    LYMPHOCYTES 18 12 - 49 %    MONOCYTES 3 (L) 5 - 13 %    EOSINOPHILS 1 0 - 7 %    BASOPHILS 0 0 - 1 %    ABS. NEUTROPHILS 11.0 (H) 1.8 - 8.0 K/UL    ABS. LYMPHOCYTES 2.5 0.8 - 3.5 K/UL    ABS. MONOCYTES 0.4 0.0 - 1.0 K/UL    ABS. EOSINOPHILS 0.1 0.0 - 0.4 K/UL    ABS. BASOPHILS 0.0 0.0 - 0.1 K/UL    RBC COMMENTS MACROCYTOSIS  1+        WBC COMMENTS TOXIC GRANULATION     LACTIC ACID    Collection Time: 07/26/17  5:39 PM   Result Value Ref Range    Lactic acid 1.9 0.4 - 2.0 MMOL/L   METABOLIC PANEL, COMPREHENSIVE    Collection Time: 07/26/17  5:39 PM   Result Value Ref Range    Sodium 136 136 - 145 mmol/L    Potassium 3.8 3.5 - 5.1 mmol/L    Chloride 102 97 - 108 mmol/L    CO2 24 21 - 32 mmol/L    Anion gap 10 5 - 15 mmol/L    Glucose 161 (H) 65 - 100 mg/dL    BUN 31 (H) 6 - 20 MG/DL    Creatinine 1.86 (H) 0.55 - 1.02 MG/DL    BUN/Creatinine ratio 17 12 - 20      GFR est AA 33 (L) >60 ml/min/1.73m2    GFR est non-AA 27 (L) >60 ml/min/1.73m2    Calcium 8.8 8.5 - 10.1 MG/DL    Bilirubin, total 0.4 0.2 - 1.0 MG/DL    ALT (SGPT) 34 12 - 78 U/L    AST (SGOT) 33 15 - 37 U/L    Alk. phosphatase 90 45 - 117 U/L    Protein, total 7.6 6.4 - 8.2 g/dL    Albumin 3.6 3.5 - 5.0 g/dL    Globulin 4.0 2.0 - 4.0 g/dL    A-G Ratio 0.9 (L) 1.1 - 2.2     LIPASE    Collection Time: 07/26/17  5:39 PM   Result Value Ref Range    Lipase 253 73 - 393 U/L   TROPONIN I    Collection Time: 07/26/17  5:39 PM   Result Value Ref Range    Troponin-I, Qt. <0.04 <0.05 ng/mL         Assessment:     Abdominal pain, suspect perforated viscus, favor peptic ulcer.   Pt is a chronic alcoholic drinking 3 liters of gin per week. She has acute kidney injury from baseline. Plan:     1. I recommend proceeding with Surgery:  Exploratory laparotomy and repair of perforation, possible bowel resection or ostomy. Pt at high risk for bleeding complications, alcohol withdrawal.      2. Discussed aspects of surgical intervention, methods, risks including by not limited to infection, bleeding, hematoma, and perforation of the intestines or solid organs, and the risks of general anesthetic. The patient understands the risks; any and all questions were answered to the patient's satisfaction.     Signed By: Eric Ornelas MD, [facility]    July 26, 2017

## 2017-07-27 NOTE — CONSULTS
Hospitalist Consultation Note    NAME: Kingsley Leija   :  1950   MRN:  [id number]     Date/Time:  2017 10:38 AM    Patient PCP: Billy Cavazos MD    I have been asked to see this patient by the attending, Dr. Cristobal Alston , for advice/opinion re: alcohol abuse. My advice is:  ________________________________________________________________________   Assessment &  Plan:  Alcohol abuse, POA  --drink 1.5L gin a week. High risk for withdrawal and DT. Last drink 17  --start ativan 1mg IV q8h with additional IV ativan 1-2mg prn depending CIWA score. Start daily goody bag. Acute kidney injury, POA  --improving with IVF. Cr 1.8 --> 1.4. Continue IVF    Perforated prepyloric peptic ulcer, POA  Sepsis, POA due to perforated ulcer  --s/p exploratory laparotomy and repair of ulcer and Melvin patch 17  --continue IV PPI. Follow up on H. Pylori Ab  --continue zosyn as per gen surg. --still with significant abd pain postop. No flatus    HTN  --cannot recall medication. BP currently normal without medication. Subjective:   CHIEF COMPLAINT:  \"abdominal pain\"    HISTORY OF PRESENT ILLNESS:     Kingsley Leija is a 78 y.o.  female admitted yesterday for perforated viscus. Consulted for alcohol withdrawal.  Patient drinks 1 \"large bottle and small bottle\" of gin a week. Denies any hx of alcohol withdrawal or DT. Last drink . Denies SOB.  +mild cough with white sputum. No chest pain. +severe abdominal pain 10/10 postop, slightly better after surgery. No flatus.     Past Medical History:   Diagnosis Date    Alcohol abuse     Foot drop, left     Hypertension       Past Surgical History:   Procedure Laterality Date    HX HYSTERECTOMY      HX TONSILLECTOMY       Social History   Substance Use Topics    Smoking status: Current Every Day Smoker     Packs/day: 0.50    Smokeless tobacco: Not on file    Alcohol use 17.5 oz/week     35 Shots of liquor per week      Family History   Problem Relation Age of Onset    Other Mother      committed suicide    Heart Attack Father      Allergies   Allergen Reactions    Scallops Hives        Prior to Admission medications    Medication Sig Start Date End Date Taking? Authorizing Provider   raNITIdine (ZANTAC) 150 mg tablet Take 1 Tab by mouth two (2) times a day for 10 days. 7/25/17 8/4/17  Mariann Cardona MD   dicyclomine (BENTYL) 20 mg tablet Take 1 Tab by mouth every six (6) hours as needed (abdominal cramps). 7/25/17   Mariann Cardona MD   acetaminophen-codeine (TYLENOL-CODEINE #3) 300-30 mg per tablet Take 1-2 Tabs by mouth every six (6) hours as needed for Pain. Max Daily Amount: 8 Tabs. 7/25/17   Mariann Cardona MD   PHENAZOPYRIDINE HCL (AZO PO) Take  by mouth as needed. Irving Cavazos MD   GUAIFENESIN (MUCINEX PO) Take  by mouth as needed. Phys MD Macy     Current Facility-Administered Medications   Medication Dose Route Frequency    0.9% sodium chloride 1,000 mL with mvi, adult no. 4 with vit K 10 mL, thiamine 359 mg, folic acid 1 mg infusion   IntraVENous Q24H    LORazepam (ATIVAN) injection 1 mg  1 mg IntraVENous Q1H PRN    LORazepam (ATIVAN) injection 2 mg  2 mg IntraVENous Q1H PRN    LORazepam (ATIVAN) injection 1 mg  1 mg IntraVENous Q8H    piperacillin-tazobactam (ZOSYN) 3.375 g in 0.9% sodium chloride (MBP/ADV) 100 mL  3.375 g IntraVENous Q8H    sodium chloride (NS) flush 5-10 mL  5-10 mL IntraVENous Q8H    sodium chloride (NS) flush 5-10 mL  5-10 mL IntraVENous PRN    HYDROmorphone (PF) (DILAUDID) injection 0.5 mg  0.5 mg IntraVENous Q2H PRN    ondansetron (ZOFRAN) injection 4 mg  4 mg IntraVENous Q4H PRN    0.9% sodium chloride infusion  125 mL/hr IntraVENous CONTINUOUS    pantoprazole (PROTONIX) 40 mg in sodium chloride 0.9 % 10 mL injection  40 mg IntraVENous Q12H      REVIEW OF SYSTEMS:  BOLD = POSITIVE; negative = normal text  General:  fever, chills, sweats, weakness, weight loss/gain  Eyes: blurred vision, eye pain, loss of vision, diplopia  Ear Nose and Throat: rhinorrhea, pharyngitis, otalgia, tinnitus, speech or swallowing difficulties  Respiratory: cough, sputum production, SOB, wheezing, ABEBE, pleuritic pain  Cardiology:   chest pain, palpitations, orthopnea, PND, edema, syncope   Gastrointestinal: abdominal pain, N/V, dysphagia, change in bowel habits, bleeding  Genitourinary: frequency, urgency, dysuria, hematuria, incontinence  Muskuloskeletal : arthralgia, myalgia  Hematology:  easy bruising, bleeding, lymphadenopathy  Dermatological: rash, ulceration, mole change, new lesion  Endocrine: hot flashes or polydipsia  Neurological:  headache, dizziness, confusion, focal weakness, paresthesia, memory loss, gait disturbance  Psychological:  anxiety, depression, agitation    Objective:   VITALS:    Visit Vitals    /76 (BP 1 Location: Right arm, BP Patient Position: At rest;Supine)    Pulse 87    Temp 97.6 °F (36.4 °C)    Resp 16    Ht 5' 3\" (1.6 m)    Wt 78.2 kg (172 lb 6.4 oz)    SpO2 93%    BMI 30.54 kg/m2     Temp (24hrs), Av.2 °F (36.8 °C), Min:97.6 °F (36.4 °C), Max:99.3 °F (37.4 °C)    PHYSICAL EXAM:    General:    Alert, overweight female, cooperative, in pain, appears stated age. HEENT: Atraumatic, anicteric sclerae, pink conjunctivae     No oral ulcers, mucosa moist, throat clear. Hearing intact. Neck:  Supple, symmetrical,  thyroid: non tender  Lungs:   Clear to auscultation bilaterally. No Wheezing or Rhonchi. No rales. Chest wall:  No tenderness  No Accessory muscle use. Heart:   Regular  rhythm,  No  murmur   No gallop. No edema  Abdomen:   Soft,+tenderness diffuse. Drain in right abdomen. Bowel sounds normal. No masses  Extremities: No cyanosis. No clubbing  Skin:     Not pale Not Jaundiced  Mild redness neck. Telangiectasia upper chest  Psych:  Good insight. Not depressed. Not anxious or agitated. Neurologic: EOMs intact.  No facial asymmetry. No aphasia or slurred speech. Alert and oriented X 3. Very mild hand tremor. ________________________________________________________________________  Care Plan discussed with:    Comments   Patient y    CHAVA [facility]                    Consultant:      ________________________________________________________________________  TOTAL TIME:  40 minutes  ________________________________________________________________________  Nelsy Christianson MD      Procedures: see electronic medical records for all procedures/Xrays and details which were not copied into this note but were reviewed prior to creation of Plan. LAB DATA REVIEWED:    Recent Results (from the past 24 hour(s))   EKG, 12 LEAD, INITIAL    Collection Time: 07/26/17  5:36 PM   Result Value Ref Range    Ventricular Rate 103 BPM    Atrial Rate 103 BPM    P-R Interval 122 ms    QRS Duration 116 ms    Q-T Interval 364 ms    QTC Calculation (Bezet) 476 ms    Calculated P Axis 41 degrees    Calculated R Axis 40 degrees    Calculated T Axis 61 degrees    Diagnosis       Sinus tachycardia with fusion complexes  ST & T wave abnormality, consider lateral ischemia  When compared with ECG of 25-JUL-2017 19:24,  fusion complexes are now present  Non-specific change in ST segment in Inferior leads  Confirmed by Remi Franco, P.V. ([id number]) on 7/27/2017 10:12:27 AM     CBC WITH AUTOMATED DIFF    Collection Time: 07/26/17  5:39 PM   Result Value Ref Range    WBC 14.0 (H) 3.6 - 11.0 K/uL    RBC 3.64 (L) 3.80 - 5.20 M/uL    HGB 13.0 11.5 - 16.0 g/dL    HCT 39.4 35.0 - 47.0 %    .2 (H) 80.0 - 99.0 FL    MCH 35.7 (H) 26.0 - 34.0 PG    MCHC 33.0 30.0 - 36.5 g/dL    RDW 12.6 11.5 - 14.5 %    PLATELET 113 937 - 415 K/uL    NEUTROPHILS 78 (H) 32 - 75 %    LYMPHOCYTES 18 12 - 49 %    MONOCYTES 3 (L) 5 - 13 %    EOSINOPHILS 1 0 - 7 %    BASOPHILS 0 0 - 1 %    ABS. NEUTROPHILS 11.0 (H) 1.8 - 8.0 K/UL    ABS. LYMPHOCYTES 2.5 0.8 - 3.5 K/UL    ABS. MONOCYTES 0.4 0.0 - 1.0 K/UL    ABS. EOSINOPHILS 0.1 0.0 - 0.4 K/UL    ABS. BASOPHILS 0.0 0.0 - 0.1 K/UL    RBC COMMENTS MACROCYTOSIS  1+        WBC COMMENTS TOXIC GRANULATION     LACTIC ACID    Collection Time: 07/26/17  5:39 PM   Result Value Ref Range    Lactic acid 1.9 0.4 - 2.0 MMOL/L   METABOLIC PANEL, COMPREHENSIVE    Collection Time: 07/26/17  5:39 PM   Result Value Ref Range    Sodium 136 136 - 145 mmol/L    Potassium 3.8 3.5 - 5.1 mmol/L    Chloride 102 97 - 108 mmol/L    CO2 24 21 - 32 mmol/L    Anion gap 10 5 - 15 mmol/L    Glucose 161 (H) 65 - 100 mg/dL    BUN 31 (H) 6 - 20 MG/DL    Creatinine 1.86 (H) 0.55 - 1.02 MG/DL    BUN/Creatinine ratio 17 12 - 20      GFR est AA 33 (L) >60 ml/min/1.73m2    GFR est non-AA 27 (L) >60 ml/min/1.73m2    Calcium 8.8 8.5 - 10.1 MG/DL    Bilirubin, total 0.4 0.2 - 1.0 MG/DL    ALT (SGPT) 34 12 - 78 U/L    AST (SGOT) 33 15 - 37 U/L    Alk.  phosphatase 90 45 - 117 U/L    Protein, total 7.6 6.4 - 8.2 g/dL    Albumin 3.6 3.5 - 5.0 g/dL    Globulin 4.0 2.0 - 4.0 g/dL    A-G Ratio 0.9 (L) 1.1 - 2.2     LIPASE    Collection Time: 07/26/17  5:39 PM   Result Value Ref Range    Lipase 253 73 - 393 U/L   TROPONIN I    Collection Time: 07/26/17  5:39 PM   Result Value Ref Range    Troponin-I, Qt. <0.04 <0.05 ng/mL   TYPE & CROSSMATCH    Collection Time: 07/26/17  8:09 PM   Result Value Ref Range    Crossmatch Expiration 07/29/2017     ABO/Rh(D) A POSITIVE     Antibody screen NEG     Unit number [id number]     Blood component type  LR AS1     Unit division 00     Status of unit ALLOCATED     Crossmatch result Compatible     Unit number [id number]     Blood component type RC LR AS1     Unit division 00     Status of unit ALLOCATED     Crossmatch result Compatible    PTT    Collection Time: 07/26/17  8:09 PM   Result Value Ref Range    aPTT 25.9 22.1 - 32.5 sec    aPTT, therapeutic range     58.0 - 77.0 SECS   POC INR    Collection Time: 07/26/17  8:22 PM   Result Value Ref Range    INR (POC) 0.9 <3.3     METABOLIC PANEL, COMPREHENSIVE    Collection Time: 07/27/17  5:43 AM   Result Value Ref Range    Sodium 136 136 - 145 mmol/L    Potassium 4.4 3.5 - 5.1 mmol/L    Chloride 105 97 - 108 mmol/L    CO2 25 21 - 32 mmol/L    Anion gap 6 5 - 15 mmol/L    Glucose 159 (H) 65 - 100 mg/dL    BUN 29 (H) 6 - 20 MG/DL    Creatinine 1.42 (H) 0.55 - 1.02 MG/DL    BUN/Creatinine ratio 20 12 - 20      GFR est AA 45 (L) >60 ml/min/1.73m2    GFR est non-AA 37 (L) >60 ml/min/1.73m2    Calcium 8.2 (L) 8.5 - 10.1 MG/DL    Bilirubin, total 0.6 0.2 - 1.0 MG/DL    ALT (SGPT) 29 12 - 78 U/L    AST (SGOT) 25 15 - 37 U/L    Alk. phosphatase 69 45 - 117 U/L    Protein, total 6.8 6.4 - 8.2 g/dL    Albumin 3.0 (L) 3.5 - 5.0 g/dL    Globulin 3.8 2.0 - 4.0 g/dL    A-G Ratio 0.8 (L) 1.1 - 2.2     CBC WITH AUTOMATED DIFF    Collection Time: 07/27/17  5:43 AM   Result Value Ref Range    WBC 11.6 (H) 3.6 - 11.0 K/uL    RBC 3.42 (L) 3.80 - 5.20 M/uL    HGB 12.3 11.5 - 16.0 g/dL    HCT 37.1 35.0 - 47.0 %    .5 (H) 80.0 - 99.0 FL    MCH 36.0 (H) 26.0 - 34.0 PG    MCHC 33.2 30.0 - 36.5 g/dL    RDW 12.7 11.5 - 14.5 %    PLATELET 501 815 - 307 K/uL    NEUTROPHILS 89 (H) 32 - 75 %    LYMPHOCYTES 8 (L) 12 - 49 %    MONOCYTES 3 (L) 5 - 13 %    EOSINOPHILS 0 0 - 7 %    BASOPHILS 0 0 - 1 %    ABS. NEUTROPHILS 10.4 (H) 1.8 - 8.0 K/UL    ABS. LYMPHOCYTES 0.9 0.8 - 3.5 K/UL    ABS. MONOCYTES 0.3 0.0 - 1.0 K/UL    ABS. EOSINOPHILS 0.0 0.0 - 0.4 K/UL    ABS.  BASOPHILS 0.0 0.0 - 0.1 K/UL    RBC COMMENTS MACROCYTOSIS  1+        WBC COMMENTS TOXIC GRANULATION

## 2017-07-27 NOTE — PROGRESS NOTES
Admit Date: 2017    POD 1 Day Post-Op    Procedure:  Procedure(s):  LAPAROTOMY EXPLORATORY, REPAIR OF PERFORATED GASTRIC ULCER AND GRAM PATCH    Subjective:     Patient has complaints of pain. Objective:     Blood pressure 110/70, pulse 80, temperature 97.9 °F (36.6 °C), resp. rate 14, height 5' 3\" (1.6 m), weight 172 lb 6.4 oz (78.2 kg), SpO2 96 %. Temp (24hrs), Av.2 °F (36.8 °C), Min:97.6 °F (36.4 °C), Max:99.3 °F (37.4 °C)      Physical Exam:  GENERAL: alert, cooperative, no distress, appears stated age, LUNG: clear to auscultation bilaterally, HEART: regular rate and rhythm, ABDOMEN: soft, non-tender. Wound c/d/i, EXTREMITIES:  extremities normal, atraumatic, no cyanosis or edema    Labs:   Recent Results (from the past 24 hour(s))   EKG, 12 LEAD, INITIAL    Collection Time: 17  5:36 PM   Result Value Ref Range    Ventricular Rate 103 BPM    Atrial Rate 103 BPM    P-R Interval 122 ms    QRS Duration 116 ms    Q-T Interval 364 ms    QTC Calculation (Bezet) 476 ms    Calculated P Axis 41 degrees    Calculated R Axis 40 degrees    Calculated T Axis 61 degrees    Diagnosis       Sinus tachycardia with fusion complexes  ST & T wave abnormality, consider lateral ischemia  When compared with ECG of 2017 19:24,  fusion complexes are now present  Non-specific change in ST segment in Inferior leads  Confirmed by Nevin Carranza, P.VEber ([id number]) on 2017 10:12:27 AM     CBC WITH AUTOMATED DIFF    Collection Time: 17  5:39 PM   Result Value Ref Range    WBC 14.0 (H) 3.6 - 11.0 K/uL    RBC 3.64 (L) 3.80 - 5.20 M/uL    HGB 13.0 11.5 - 16.0 g/dL    HCT 39.4 35.0 - 47.0 %    .2 (H) 80.0 - 99.0 FL    MCH 35.7 (H) 26.0 - 34.0 PG    MCHC 33.0 30.0 - 36.5 g/dL    RDW 12.6 11.5 - 14.5 %    PLATELET 412 166 - 421 K/uL    NEUTROPHILS 78 (H) 32 - 75 %    LYMPHOCYTES 18 12 - 49 %    MONOCYTES 3 (L) 5 - 13 %    EOSINOPHILS 1 0 - 7 %    BASOPHILS 0 0 - 1 %    ABS.  NEUTROPHILS 11.0 (H) 1.8 - 8.0 K/UL ABS. LYMPHOCYTES 2.5 0.8 - 3.5 K/UL    ABS. MONOCYTES 0.4 0.0 - 1.0 K/UL    ABS. EOSINOPHILS 0.1 0.0 - 0.4 K/UL    ABS. BASOPHILS 0.0 0.0 - 0.1 K/UL    RBC COMMENTS MACROCYTOSIS  1+        WBC COMMENTS TOXIC GRANULATION     LACTIC ACID    Collection Time: 07/26/17  5:39 PM   Result Value Ref Range    Lactic acid 1.9 0.4 - 2.0 MMOL/L   METABOLIC PANEL, COMPREHENSIVE    Collection Time: 07/26/17  5:39 PM   Result Value Ref Range    Sodium 136 136 - 145 mmol/L    Potassium 3.8 3.5 - 5.1 mmol/L    Chloride 102 97 - 108 mmol/L    CO2 24 21 - 32 mmol/L    Anion gap 10 5 - 15 mmol/L    Glucose 161 (H) 65 - 100 mg/dL    BUN 31 (H) 6 - 20 MG/DL    Creatinine 1.86 (H) 0.55 - 1.02 MG/DL    BUN/Creatinine ratio 17 12 - 20      GFR est AA 33 (L) >60 ml/min/1.73m2    GFR est non-AA 27 (L) >60 ml/min/1.73m2    Calcium 8.8 8.5 - 10.1 MG/DL    Bilirubin, total 0.4 0.2 - 1.0 MG/DL    ALT (SGPT) 34 12 - 78 U/L    AST (SGOT) 33 15 - 37 U/L    Alk.  phosphatase 90 45 - 117 U/L    Protein, total 7.6 6.4 - 8.2 g/dL    Albumin 3.6 3.5 - 5.0 g/dL    Globulin 4.0 2.0 - 4.0 g/dL    A-G Ratio 0.9 (L) 1.1 - 2.2     LIPASE    Collection Time: 07/26/17  5:39 PM   Result Value Ref Range    Lipase 253 73 - 393 U/L   TROPONIN I    Collection Time: 07/26/17  5:39 PM   Result Value Ref Range    Troponin-I, Qt. <0.04 <0.05 ng/mL   TYPE & CROSSMATCH    Collection Time: 07/26/17  8:09 PM   Result Value Ref Range    Crossmatch Expiration 07/29/2017     ABO/Rh(D) A POSITIVE     Antibody screen NEG     Unit number [id number]     Blood component type RC LR AS1     Unit division 00     Status of unit ALLOCATED     Crossmatch result Compatible     Unit number [id number]     Blood component type RC LR AS1     Unit division 00     Status of unit ALLOCATED     Crossmatch result Compatible    PTT    Collection Time: 07/26/17  8:09 PM   Result Value Ref Range    aPTT 25.9 22.1 - 32.5 sec    aPTT, therapeutic range     58.0 - 77.0 SECS   POC INR    Collection Time: 07/26/17  8:22 PM   Result Value Ref Range    INR (POC) 0.9 <1.2     CULTURE, BODY FLUID W GRAM STAIN    Collection Time: 07/26/17  9:30 PM   Result Value Ref Range    Special Requests: NO SPECIAL REQUESTS      GRAM STAIN RARE WBCS SEEN      GRAM STAIN NO ORGANISMS SEEN      Culture result: PENDING    METABOLIC PANEL, COMPREHENSIVE    Collection Time: 07/27/17  5:43 AM   Result Value Ref Range    Sodium 136 136 - 145 mmol/L    Potassium 4.4 3.5 - 5.1 mmol/L    Chloride 105 97 - 108 mmol/L    CO2 25 21 - 32 mmol/L    Anion gap 6 5 - 15 mmol/L    Glucose 159 (H) 65 - 100 mg/dL    BUN 29 (H) 6 - 20 MG/DL    Creatinine 1.42 (H) 0.55 - 1.02 MG/DL    BUN/Creatinine ratio 20 12 - 20      GFR est AA 45 (L) >60 ml/min/1.73m2    GFR est non-AA 37 (L) >60 ml/min/1.73m2    Calcium 8.2 (L) 8.5 - 10.1 MG/DL    Bilirubin, total 0.6 0.2 - 1.0 MG/DL    ALT (SGPT) 29 12 - 78 U/L    AST (SGOT) 25 15 - 37 U/L    Alk. phosphatase 69 45 - 117 U/L    Protein, total 6.8 6.4 - 8.2 g/dL    Albumin 3.0 (L) 3.5 - 5.0 g/dL    Globulin 3.8 2.0 - 4.0 g/dL    A-G Ratio 0.8 (L) 1.1 - 2.2     CBC WITH AUTOMATED DIFF    Collection Time: 07/27/17  5:43 AM   Result Value Ref Range    WBC 11.6 (H) 3.6 - 11.0 K/uL    RBC 3.42 (L) 3.80 - 5.20 M/uL    HGB 12.3 11.5 - 16.0 g/dL    HCT 37.1 35.0 - 47.0 %    .5 (H) 80.0 - 99.0 FL    MCH 36.0 (H) 26.0 - 34.0 PG    MCHC 33.2 30.0 - 36.5 g/dL    RDW 12.7 11.5 - 14.5 %    PLATELET 417 142 - 993 K/uL    NEUTROPHILS 89 (H) 32 - 75 %    LYMPHOCYTES 8 (L) 12 - 49 %    MONOCYTES 3 (L) 5 - 13 %    EOSINOPHILS 0 0 - 7 %    BASOPHILS 0 0 - 1 %    ABS. NEUTROPHILS 10.4 (H) 1.8 - 8.0 K/UL    ABS. LYMPHOCYTES 0.9 0.8 - 3.5 K/UL    ABS. MONOCYTES 0.3 0.0 - 1.0 K/UL    ABS. EOSINOPHILS 0.0 0.0 - 0.4 K/UL    ABS.  BASOPHILS 0.0 0.0 - 0.1 K/UL    RBC COMMENTS MACROCYTOSIS  1+        WBC COMMENTS TOXIC GRANULATION         Data Review images and reports reviewed    Assessment:     Principal Problem:    Perforated viscus (7/26/2017)    Active Problems:    Chronic alcoholism ([facility], [place] [address] (7/26/2017)      MELBA (acute kidney injury) ([facility], [place] [address] (7/26/2017)        Plan/Recommendations/Medical Decision Making:     Continue present treatment   Exceptionally high risk for EtOH w/d  Dr. Edy Virgen to see from hospitalist service, thank you. Pt had initial consultation scheduled with Dr. [place] at [facility] for next Friday. Continue NGT and strict NPO until Tuesday. H pylori pending    Ophelia Camp.  Elza Lerma MD, [facility]

## 2017-07-27 NOTE — PROGRESS NOTES
[address] [facility] SHIFT NURSING NOTE    Bedside shift change report given to Mela Matthews RN (oncoming nurse) by Vee Herrera RN (offgoing nurse). Report included the following information SBAR, Kardex, MAR, Recent Results and Cardiac Rhythm SR/SB. SHIFT SUMMARY: 09:58 - Called hospitalist for consult placed yesterday, but no indication found that they are aware. Dr Lane Aas to call back. 13:11 - Patient medicated for pain nearly every two hours. She sleeps between doses and has refused to sit to side of bed or raise HOB due to extreme pain with any movement. Family at bedside, will encourage her to try bed in chair position at least when medication takes effect. O2 weaned to 2 LPM NC. Will monitor. 13:26 - Addressed pain medication regimen with Dr Steven Loams. Family concerned that her pain is not being controlled at 0.5mg IV Dilaudid q 2hr. No changes made at this time. 15:37 - Patient snoring lightly, bed remains in chair position. 96% O2 sats on 2LPM NC. Decreased to 1 LPM NC. Admission Date 7/26/2017   Admission Diagnosis perperated viscous  Perforated viscus   Consults IP CONSULT TO HOSPITALIST        Consults   [x] PT   [] OT   [] Speech   [] Palliative      [] Hospice    [] Case Management   [x] None   Cardiac Monitoring   [x] Yes   [] No     Antibiotics   [x] Yes   [] No   GI Prophylaxis  (Ex: Protonix, Pepcid, etc,.)   [x] Yes   [] No          DVT Prophylaxis   SCDs:  Sequential Compression Device: Bilateral          Jose stockings:         [] Medication (Ex: Lovenox, Eliquis, Brilinta, Coumadin,  Heparin, etc..)   [] Contraindicated   [] No VTE needed       Urinary Catheter Urinary Catheter 07/26/17 [place]; 2- way-Criteria for Appropriate Use: Surgical procedure     Urinary Catheter 07/26/17 [place]; 2- way-Urine Output (mL): 125 ml     LDAs               Peripheral IV 07/26/17 Right Antecubital (Active)   Site Assessment Clean, dry, & intact 7/27/2017  7:44 AM   Phlebitis Assessment 0 7/27/2017  7:44 AM   Infiltration Assessment 0 7/27/2017  7:44 AM   Dressing Status Clean, dry, & intact 7/27/2017  7:44 AM   Dressing Type Tape;Transparent 7/27/2017  7:44 AM   Hub Color/Line Status Pink; Infusing 7/27/2017  7:44 AM   Action Taken Blood drawn 7/26/2017  5:46 PM       Peripheral IV Left Hand (Active)   Site Assessment Clean, dry, & intact 7/27/2017  7:44 AM   Phlebitis Assessment 0 7/27/2017  7:44 AM   Infiltration Assessment 0 7/27/2017  7:44 AM   Dressing Status Clean, dry, & intact 7/27/2017  7:44 AM   Dressing Type Tape;Transparent 7/27/2017  7:44 AM   Hub Color/Line Status Pink;Capped 7/27/2017  7:44 AM          Rudolph-Snider Drain 07/26/17 Abdomen (Active)   Site Assessment Clean, dry, & intact 7/27/2017  7:44 AM   Dressing Status Clean, dry, & intact 7/27/2017  7:44 AM   Status Draining 7/27/2017  7:44 AM   Drainage Color Serosanguinous 7/27/2017  7:44 AM   Output (ml) 50 ml 7/27/2017 12:38 AM       Nasogastric Tube 07/26/17 (Active)   Site Assessment Clean, dry, & intact 7/27/2017  7:44 AM   Dressing Status Clean, dry, & intact 7/27/2017  7:44 AM   G Port Status Continuous Suction 7/27/2017  7:44 AM   External Insertion Tommy (cms) 73 cms 7/27/2017  7:44 AM   Action Taken Retaped 7/27/2017 12:38 AM   Drainage Description Green 7/27/2017  7:44 AM   Drainage Chamber Level (ml) 50 ml 7/27/2017  7:44 AM   Output (ml) 50 ml 7/27/2017  7:44 AM                I/Os   Intake/Output Summary (Last 24 hours) at 07/27/17 0957  Last data filed at 07/27/17 0744   Gross per 24 hour   Intake           966.67 ml   Output              625 ml   Net           341.67 ml         Activity Level Activity Level: Bed Rest     Activity Assistance: Partial (one person)   Diet Active Orders   Diet    DIET NPO      Purposeful Rounding every 1-2 hour?    [x] Yes    Moni Score  Total Score: 3   Bed Alarm (If score 3 or >)   [x] Yes    [] Refused (See signed refusal form in chart)   Darrel Score  Darrel Score: 19       Darrel Score (if score 14 or less)   [] PMT consult   [] Nutrition consult   [] Wound Care consult      []  Specialty bed         Influenza Vaccine Received Flu Vaccine for Current Season (usually Sept-March): Not Flu Season               Needs prior to discharge:   Home O2 required:    [] Yes   [x] No wean as able    If yes, how much O2 required?     Other:    Last Bowel Movement Date: 07/26/17   Readmission Risk Assessment Tool Score Low Risk            9       Total Score        3 Relationship with PCP    2 Patient Living Status    4 More than 1 Admission in calendar year        Criteria that do not apply:    Patient Length of Stay > 5    Patient Insurance is Medicare, Medicaid or Self Pay    Charlson Comorbidity Score       Expected Length of Stay - - -   Actual Length of Stay 1

## 2017-07-27 NOTE — PROGRESS NOTES
Bedside shift change report given to CAROLYNN Matson (oncoming nurse) by Janessa Roberts RN (offgoing nurse). Report included the following information SBAR, Kardex, MAR, Recent Results and Cardiac Rhythm NSR.

## 2017-07-27 NOTE — ANESTHESIA PREPROCEDURE EVALUATION
Anesthetic History   No history of anesthetic complications            Review of Systems / Medical History  Patient summary reviewed, nursing notes reviewed and pertinent labs reviewed    Pulmonary          Smoker         Neuro/Psych   Within defined limits           Cardiovascular  Within defined limits                Exercise tolerance: >4 METS     GI/Hepatic/Renal         Renal disease: ARF       Endo/Other  Within defined limits           Other Findings   Comments: Perforated bowel  H/O alcoholism         Physical Exam    Airway  Mallampati: III  TM Distance: 4 - 6 cm  Neck ROM: normal range of motion   Mouth opening: Normal     Cardiovascular  Regular rate and rhythm,  S1 and S2 normal,  no murmur, click, rub, or gallop             Dental  No notable dental hx       Pulmonary  Breath sounds clear to auscultation               Abdominal  GI exam deferred       Other Findings            Anesthetic Plan    ASA: 2, emergent  Anesthesia type: general    Monitoring Plan: BIS      Induction: Intravenous  Anesthetic plan and risks discussed with: Patient

## 2017-07-27 NOTE — ANESTHESIA POSTPROCEDURE EVALUATION
Post-Anesthesia Evaluation and Assessment    Patient: Gayle Lawson MRN: [id number]  SSN: [id number]    YOB: 1950  Age: 78 y.o. Sex: female       Cardiovascular Function/Vital Signs  Visit Vitals    /58    Pulse 92    Temp 37.4 °C (99.3 °F)    Resp 15    Ht 5' 3\" (1.6 m)    Wt 75.8 kg (167 lb 1.7 oz)    SpO2 96%    BMI 29.6 kg/m2       Patient is status post general anesthesia for Procedure(s):  LAPAROTOMY EXPLORATORY, REPAIR OF PERFORATED GASTRIC ULCER AND GRAM PATCH. Nausea/Vomiting: None    Postoperative hydration reviewed and adequate. Pain:  Pain Scale 1: Numeric (0 - 10) (07/26/17 2301)  Pain Intensity 1: 5 (07/26/17 2301)   Managed    Neurological Status:   Neuro (WDL): Exceptions to WDL (07/26/17 2207)  Neuro  Neurologic State: Drowsy (07/26/17 2207)  Orientation Level: Oriented X4 (07/26/17 2207)  Cognition: Follows commands (07/26/17 2207)  Speech: Clear (07/26/17 2207)  LUE Motor Response: Purposeful (07/26/17 2207)  LLE Motor Response: Purposeful (07/26/17 2207)  RUE Motor Response: Purposeful (07/26/17 2207)  RLE Motor Response: Purposeful (07/26/17 2207)   At baseline    Mental Status and Level of Consciousness: Arousable    Pulmonary Status:   O2 Device: Nasal cannula (07/26/17 2300)   Adequate oxygenation and airway patent    Complications related to anesthesia: None    Post-anesthesia assessment completed.  No concerns    Signed By: Georgette Iverson MD     July 26, 2017

## 2017-07-27 NOTE — PERIOP NOTES
2207-Handoff Report from Operating Room to PACU    Report received from Esperanza Antonio RN and Laurie Bateman CRNA regarding Gayle Lawson. Surgeon(s):  Jaylyn Christy MD  And Procedure(s) (LRB):  LAPAROTOMY EXPLORATORY, REPAIR OF PERFORATED GASTRIC ULCER AND GRAM PATCH (Bilateral)  confirmed   with allergies, drains and dressings discussed. Anesthesia type, drugs, patient history, complications, estimated blood loss, vital signs, intake and output, and last pain medication, lines, reversal medications and temperature were reviewed. 2316-TRANSFER - OUT REPORT:    Verbal report given to Joe Norris RN(name) on Gayle Lawson  being transferred to [facility](unit) for routine post - op       Report consisted of patients Situation, Background, Assessment and   Recommendations(SBAR). Information from the following report(s) SBAR, Kardex, OR Summary, Procedure Summary, Intake/Output, MAR and Recent Results was reviewed with the receiving nurse. Opportunity for questions and clarification was provided. Patient transported with:   O2 @ 3 liters  Registered Nursex2    Family updated.

## 2017-07-27 NOTE — BRIEF OP NOTE
BRIEF OPERATIVE NOTE    Date of Procedure: 7/26/2017   Preoperative Diagnosis: perforated viscous  Postoperative Diagnosis: perforated prepyloric peptic ulcer   Procedure(s):  LAPAROTOMY EXPLORATORY, REPAIR OF PERFORATED GASTRIC ULCER AND KANDICE PATCH  Surgeon(s) and Role:     * Judy Ontiveros MD - Primary         Assistant Staff:       Surgical Staff:  Circ-1: Merton Severin  Scrub Tech-1: Argelia Galvan  Surg Asst-1: Belen Rizzo  Event Time In   Incision Start 2119   Incision Close 2154     Anesthesia: General   Estimated Blood Loss: < 25 cc  Specimens:   ID Type Source Tests Collected by Time Destination   1 : Gastric Ulcer Wall Preservative Abdomen  Judy Ontiveros MD 7/26/2017 2134 Pathology   1 : Peritoneal Fluid Peritoneal Fluid Abdomen ANAEROBIC/AEROBIC/GRAM STAIN Judy Ontiveros MD 7/26/2017 2138 Microbiology      Findings: perforated prepyloric gastric ulcer with minimal peritoneal spillage   Complications: none  Implants: * No implants in log *

## 2017-07-27 NOTE — PROGRESS NOTES
TRANSFER - IN REPORT:    Verbal report received from [place], [address] [place] [address] (name) on Josselyn Ear  being received from PACU (unit) for routine progression of care      Report consisted of patients Situation, Background, Assessment and   Recommendations(SBAR). Information from the following report(s) SBAR, Kardex, OR Summary, Intake/Output and MAR was reviewed with the receiving nurse. Opportunity for questions and clarification was provided. Assessment completed upon patients arrival to unit and care assumed.          Primary Nurse Archie Carrel, RN and Melodie Jurado RN performed a dual skin assessment on this patient No impairment noted  Darrel score is 18

## 2017-07-28 LAB
H PYLORI IGA SER-ACNC: 13.3 UNITS (ref 0–8.9)
H PYLORI IGG SER IA-ACNC: <0.9 U/ML (ref 0–0.8)

## 2017-07-28 PROCEDURE — 97116 GAIT TRAINING THERAPY: CPT

## 2017-07-28 PROCEDURE — 74011250636 HC RX REV CODE- 250/636: Performed by: SURGERY

## 2017-07-28 PROCEDURE — 74011000250 HC RX REV CODE- 250: Performed by: SURGERY

## 2017-07-28 PROCEDURE — G8979 MOBILITY GOAL STATUS: HCPCS

## 2017-07-28 PROCEDURE — 65270000029 HC RM PRIVATE

## 2017-07-28 PROCEDURE — C9113 INJ PANTOPRAZOLE SODIUM, VIA: HCPCS | Performed by: SURGERY

## 2017-07-28 PROCEDURE — 74011000258 HC RX REV CODE- 258: Performed by: SURGERY

## 2017-07-28 PROCEDURE — 77030027138 HC INCENT SPIROMETER -A

## 2017-07-28 PROCEDURE — 97530 THERAPEUTIC ACTIVITIES: CPT

## 2017-07-28 PROCEDURE — 65660000000 HC RM CCU STEPDOWN

## 2017-07-28 PROCEDURE — 74011000250 HC RX REV CODE- 250: Performed by: HOSPITALIST

## 2017-07-28 PROCEDURE — 74011250636 HC RX REV CODE- 250/636: Performed by: HOSPITALIST

## 2017-07-28 PROCEDURE — 77030019563 HC DEV ATTCH FEED HOLL -A

## 2017-07-28 PROCEDURE — G8978 MOBILITY CURRENT STATUS: HCPCS

## 2017-07-28 PROCEDURE — 97161 PT EVAL LOW COMPLEX 20 MIN: CPT

## 2017-07-28 RX ORDER — LISINOPRIL AND HYDROCHLOROTHIAZIDE 12.5; 2 MG/1; MG/1
1 TABLET ORAL DAILY
COMMUNITY
End: 2022-06-21

## 2017-07-28 RX ORDER — NAPROXEN SODIUM 220 MG
440 TABLET ORAL
COMMUNITY
End: 2017-11-09

## 2017-07-28 RX ORDER — BISMUTH SUBSALICYLATE 262 MG
2 TABLET,CHEWABLE ORAL DAILY
COMMUNITY
End: 2017-11-09

## 2017-07-28 RX ORDER — IBUPROFEN 200 MG
1 TABLET ORAL DAILY
COMMUNITY

## 2017-07-28 RX ADMIN — PIPERACILLIN SODIUM,TAZOBACTAM SODIUM 3.38 G: 3; .375 INJECTION, POWDER, FOR SOLUTION INTRAVENOUS at 12:21

## 2017-07-28 RX ADMIN — SODIUM CHLORIDE 125 ML/HR: 900 INJECTION, SOLUTION INTRAVENOUS at 20:33

## 2017-07-28 RX ADMIN — LORAZEPAM 1 MG: 2 INJECTION INTRAMUSCULAR; INTRAVENOUS at 13:28

## 2017-07-28 RX ADMIN — Medication 10 ML: at 05:42

## 2017-07-28 RX ADMIN — HYDROMORPHONE HYDROCHLORIDE 0.5 MG: 1 INJECTION, SOLUTION INTRAMUSCULAR; INTRAVENOUS; SUBCUTANEOUS at 06:43

## 2017-07-28 RX ADMIN — LORAZEPAM 1 MG: 2 INJECTION INTRAMUSCULAR; INTRAVENOUS at 22:36

## 2017-07-28 RX ADMIN — HYDROMORPHONE HYDROCHLORIDE 0.5 MG: 1 INJECTION, SOLUTION INTRAMUSCULAR; INTRAVENOUS; SUBCUTANEOUS at 12:04

## 2017-07-28 RX ADMIN — HYDROMORPHONE HYDROCHLORIDE 0.5 MG: 1 INJECTION, SOLUTION INTRAMUSCULAR; INTRAVENOUS; SUBCUTANEOUS at 15:04

## 2017-07-28 RX ADMIN — HYDROMORPHONE HYDROCHLORIDE 0.5 MG: 1 INJECTION, SOLUTION INTRAMUSCULAR; INTRAVENOUS; SUBCUTANEOUS at 21:40

## 2017-07-28 RX ADMIN — PIPERACILLIN SODIUM,TAZOBACTAM SODIUM 3.38 G: 3; .375 INJECTION, POWDER, FOR SOLUTION INTRAVENOUS at 21:55

## 2017-07-28 RX ADMIN — PIPERACILLIN SODIUM,TAZOBACTAM SODIUM 3.38 G: 3; .375 INJECTION, POWDER, FOR SOLUTION INTRAVENOUS at 05:42

## 2017-07-28 RX ADMIN — SODIUM CHLORIDE 40 MG: 9 INJECTION INTRAMUSCULAR; INTRAVENOUS; SUBCUTANEOUS at 08:48

## 2017-07-28 RX ADMIN — Medication 10 ML: at 21:59

## 2017-07-28 RX ADMIN — Medication 10 ML: at 13:30

## 2017-07-28 RX ADMIN — HYDROMORPHONE HYDROCHLORIDE 0.5 MG: 1 INJECTION, SOLUTION INTRAMUSCULAR; INTRAVENOUS; SUBCUTANEOUS at 19:36

## 2017-07-28 RX ADMIN — HYDROMORPHONE HYDROCHLORIDE 0.5 MG: 1 INJECTION, SOLUTION INTRAMUSCULAR; INTRAVENOUS; SUBCUTANEOUS at 08:47

## 2017-07-28 RX ADMIN — SODIUM CHLORIDE 40 MG: 9 INJECTION INTRAMUSCULAR; INTRAVENOUS; SUBCUTANEOUS at 21:59

## 2017-07-28 RX ADMIN — LORAZEPAM 1 MG: 2 INJECTION INTRAMUSCULAR; INTRAVENOUS at 05:42

## 2017-07-28 RX ADMIN — FOLIC ACID: 5 INJECTION, SOLUTION INTRAMUSCULAR; INTRAVENOUS; SUBCUTANEOUS at 11:18

## 2017-07-28 RX ADMIN — HYDROMORPHONE HYDROCHLORIDE 0.5 MG: 1 INJECTION, SOLUTION INTRAMUSCULAR; INTRAVENOUS; SUBCUTANEOUS at 01:07

## 2017-07-28 NOTE — PROGRESS NOTES
Pharmacy Medication Reconciliation     The patient was interviewed regarding current PTA medication list, use and drug allergies;  patient's  was present in room and obtained permission from patient to discuss drug regimen with visitor(s) present. The patient was questioned regarding use of any other inhalers, topical products, over the counter medications, herbal medications, vitamin products or ophthalmic/nasal/otic medication use. Allergy Update: No changed    Recommendations/Findings: The following amendments were made to the patient's active medication list on file at [facility]:   1) Additions:       - Lisinopril/HCTZ 20/12.5 mg daily       - Immodium AD liquid daily      - Theratears as needed for dry eye      - Aleve 2 tablets as needed for pain      - Multivitamin 2 tablets daily      - Nicotine patch 14 mg daily only on Monday through Friday (patient is not able to smoke at work so she uses the patches and still smokes 1/2 pack per day on the weekends)    2) Deletions:       - Guaifenesin      - Phenazopyridine     3) Changes: None    4) Additional Information:      - Of note, the sherrill came to the ED on Tuesday and was prescribed Tylenol #3, ranitidine, and Bentyl. She has not yet taken any of these agents as she came back to the hospital the next day but she has the hard copies still.       - Patient reports that she took a hydrocodone/acetaminophen 5/500 mg prior to coming to the hospital on Wednesday. She reports that this medication is from an issue with her back that was back in 2013.        -Clarified PTA med list with patient, Rx fill information, pill bottles, and the patient's . PTA medication list was corrected to the following:     Prior to Admission Medications   Prescriptions Last Dose Informant Patient Reported?  Taking?   acetaminophen-codeine (TYLENOL-CODEINE #3) 300-30 mg per tablet Not Taking at Unknown time  No No   Sig: Take 1-2 Tabs by mouth every six (6) hours as needed for Pain. Max Daily Amount: 8 Tabs. carboxymethylcellulose sodium (THERATEARS) 0.25 % dpet 2017 at Unknown time  Yes Yes   Sig: Apply 1-2 Drops to eye as needed (Dry eye). dicyclomine (BENTYL) 20 mg tablet Not Taking at Unknown time  No No   Sig: Take 1 Tab by mouth every six (6) hours as needed (abdominal cramps). lisinopril-hydroCHLOROthiazide (PRINZIDE, ZESTORETIC) 20-12.5 mg per tablet 2017 at Unknown time  Yes Yes   Sig: Take 1 Tab by mouth daily. loperamide (IMODIUM) 1 mg/5 mL solution 2017 at Unknown time  Yes Yes   Sig: Take 1 tsp by mouth daily. multivitamin (ONE A DAY) tablet 2017 at Unknown time  Yes Yes   Sig: Take 2 Tabs by mouth daily. naproxen sodium (ALEVE) 220 mg tablet 2017 at Unknown time  Yes Yes   Sig: Take 440 mg by mouth two (2) times daily as needed for Pain. nicotine (NICODERM CQ) 14 mg/24 hr patch 2017 at Unknown time  Yes Yes   Si Patch by TransDERmal route five (5) days a week. Monday through Friday as the patient is not allowed to smoke at work   raNITIdine (ZANTAC) 150 mg tablet Not Taking at Unknown time  No No   Sig: Take 1 Tab by mouth two (2) times a day for 10 days.       Facility-Administered Medications: None          Thank you,  Angelito Bills, PATRICKD

## 2017-07-28 NOTE — PROGRESS NOTES
[facility] SHIFT NURSING NOTE    Bedside shift change report given to Nell Toth RN (oncoming nurse) by Gris Ackerman RN (offgoing nurse). Report included the following information SBAR, Kardex, MAR, Recent Results and Cardiac Rhythm SR.    SHIFT SUMMARY: 10:09 - Patient instructed on IS use. Poor inspiratory effort and highest macy was 300. Encouraged slow deep breaths and to keep practicing. Family encouraging as well. Patient is up in recliner. 10:57 - Paged Dr Kaleb Braden for critical lab result called by [facility]-  Gm + cocci in gram stain of Thiobroth. 12:27 - Paged Dr Paola Jaramillo to report lab results. 12:47 - Dr Paola Jaramillo notified of lab results. No new orders rec'd at this time. 13:18 - Dr Neno Tong in to see patient. Notified of lab results as well. Will DC parker now per verbal order. 13:35 - Patient voided a small amount upon standing to get from bed to chair. Will monitor. Admission Date 7/26/2017   Admission Diagnosis perperated viscous  Perforated viscus   Consults IP CONSULT TO HOSPITALIST        Consults   [x] PT   [] OT   [] Speech   [] Palliative      [] Hospice    [] Case Management   [] None   Cardiac Monitoring   [x] Yes   [] No     Antibiotics   [x] Yes   [] No   GI Prophylaxis  (Ex: Protonix, Pepcid, etc,.)   [x] Yes   [] No          DVT Prophylaxis   SCDs:  Sequential Compression Device: Bilateral          Jose stockings:         [] Medication (Ex: Lovenox, Eliquis, Brilinta, Coumadin,  Heparin, etc..)   [] Contraindicated   [] No VTE needed       Urinary Catheter Urinary Catheter 07/26/17 Parker; 2- way-Criteria for Appropriate Use: Surgical procedure     Urinary Catheter 07/26/17 Parker; 2- way-Urine Output (mL): 325 ml     LDAs               Peripheral IV 07/26/17 Right Antecubital (Active)   Site Assessment Clean, dry, & intact 7/28/2017  7:58 AM   Phlebitis Assessment 0 7/28/2017  7:58 AM   Infiltration Assessment 0 7/28/2017  7:58 AM   Dressing Status Clean, dry, & intact 7/28/2017  7:58 AM Dressing Type Tape;Transparent 7/28/2017  7:58 AM   Hub Color/Line Status Pink; Infusing 7/28/2017  7:58 AM   Action Taken Blood drawn 7/26/2017  5:46 PM       Peripheral IV Left Hand (Active)   Site Assessment Clean, dry, & intact 7/28/2017  7:58 AM   Phlebitis Assessment 0 7/28/2017  7:58 AM   Infiltration Assessment 0 7/28/2017  7:58 AM   Dressing Status Clean, dry, & intact 7/28/2017  7:58 AM   Dressing Type Tape;Transparent 7/28/2017  7:58 AM   Hub Color/Line Status Pink;Capped 7/28/2017  7:58 AM          Rudolph-Snider Drain 07/26/17 Abdomen (Active)   Site Assessment Clean, dry, & intact 7/28/2017  8:02 AM   Dressing Status Clean, dry, & intact 7/28/2017  8:02 AM   Status Draining 7/28/2017  8:02 AM   Drainage Color Serosanguinous 7/28/2017  8:02 AM   Output (ml) 25 ml 7/28/2017  3:09 AM       Nasogastric Tube 07/26/17 (Active)   Site Assessment Clean, dry, & intact 7/28/2017  7:58 AM   Dressing Status Clean, dry, & intact 7/28/2017  7:58 AM   G Port Status Continuous Suction 7/28/2017  7:58 AM   External Insertion Tommy (cms) 73 cms 7/28/2017  3:09 AM   Action Taken Placement verified (comment) 7/28/2017  7:58 AM   Drainage Description Green 7/28/2017  3:09 AM   Drainage Chamber Level (ml) 190 ml 7/28/2017  7:58 AM   Output (ml) 70 ml 7/28/2017  7:58 AM                I/Os   Intake/Output Summary (Last 24 hours) at 07/28/17 1027  Last data filed at 07/28/17 0758   Gross per 24 hour   Intake          3529.17 ml   Output             1612 ml   Net          1917.17 ml         Activity Level Activity Level: Up with Assistance     Activity Assistance: Partial (one person)   Diet Active Orders   Diet    DIET NPO      Purposeful Rounding every 1-2 hour?    [x] Yes    Moni Score  Total Score: 3   Bed Alarm (If score 3 or >)   [x] Yes    [] Refused (See signed refusal form in chart)   Darrel Score  Darrel Score: 19       Darrel Score (if score 14 or less)   [] PMT consult   [] Nutrition consult   [] Wound Care consult      []  Specialty bed         Influenza Vaccine Received Flu Vaccine for Current Season (usually Sept-March): Not Flu Season               Needs prior to discharge:   Home O2 required:    [] Yes   [x] No wean as able    If yes, how much O2 required? Other:    Last Bowel Movement Date: 07/26/17   Readmission Risk Assessment Tool Score Low Risk            10       Total Score        3 Has Seen PCP in Last 6 Months (Yes=3, No=0)    2 . Living with Significant Other. Assisted Living. LTAC. SNF. or   Rehab    5 Pt.  Coverage (Medicare=5 , Medicaid, or Self-Pay=4)        Criteria that do not apply:    Patient Length of Stay (>5 days = 3)    IP Visits Last 12 Months (1-3=4, 4=9, >4=11)    Charlson Comorbidity Score (Age + Comorbid Conditions)       Expected Length of Stay 5d 12h   Actual Length of Stay 2

## 2017-07-28 NOTE — PROGRESS NOTES
Problem: Mobility Impaired (Adult and Pediatric)  Goal: *Acute Goals and Plan of Care (Insert Text)  Physical Therapy Goals  Initiated 7/28/2017  1. Patient will move from supine to sit and sit to supine , scoot up and down and roll side to side in bed with supervision/set-up within 7 day(s). 2. Patient will transfer from bed to chair and chair to bed with supervision/set-up using the least restrictive device within 7 day(s). 3. Patient will perform sit to stand with supervision/set-up within 7 day(s). 4. Patient will ambulate with supervision/set-up for 150 feet with the least restrictive device within 7 day(s). 5. Patient will ascend/descend 4 stairs with dual handrail(s) with minimal assistance/contact guard assist within 7 day(s). PHYSICAL THERAPY EVALUATION  Patient: Judith Camara (69 y.o. female)  Date: 7/28/2017  Primary Diagnosis: perperated viscous  Perforated viscus  Procedure(s) (LRB):  LAPAROTOMY EXPLORATORY, REPAIR OF PERFORATED GASTRIC ULCER AND GRAM PATCH (Bilateral) 2 Days Post-Op   Precautions:         ASSESSMENT :  Based on the objective data described below, the patient presents with generalized weakness, impaired balance, decreased activity tolerance 2/2 pain and desaturation following surgical procedure. Patient had not been OOB up until evaluation. Received in supine, very supportive spouse present, with RN having just medicated patient with IV pain medication. Patient reported having L foot drop at baseline with AFO present and utilized to prevent such with her shoes. She required max A to come to EOB, with teaching on log roll technique, with min-mod A ahead for sit<>stand with cueing for arm placement and technique. There forward, she ambulated with short shuffled steps using RW with CGA for total of 60ft.  Throughout activity patient would consistently hold her breath 2/2 pain, dropping SPO2 to mid 80's and requiring 5L NCO2 to improve with facilitation of pursed lip breathing technique. She also c/o dizziness, despite VSS, and up to 8/10 incisional pain. Concluded in chair with all needs met, on 5L NCO2, spouse present, bed alarm set, and education provided on role of OOB mobility ahead and use of incentive spirometer. Patient will need to improve functioning significantly ahead in order to return to home at ID; she refuses rehab at this time. Thus, [facility] PT/OT and family support will be required. .     Patient will benefit from skilled intervention to address the above impairments. Patients rehabilitation potential is considered to be Fair  Factors which may influence rehabilitation potential include:   [ ]         None noted  [ ]         Mental ability/status  [ ]         Medical condition  [ ]         Home/family situation and support systems  [ ]         Safety awareness  [X]         Pain tolerance/management  [ ]         Other:        PLAN :  Recommendations and Planned Interventions:  [X]           Bed Mobility Training             [ ]    Neuromuscular Re-Education  [X]           Transfer Training                   [ ]    Orthotic/Prosthetic Training  [X]           Gait Training                         [ ]    Modalities  [X]           Therapeutic Exercises           [ ]    Edema Management/Control  [X]           Therapeutic Activities            [X]    Patient and Family Training/Education  [ ]           Other (comment):     Frequency/Duration: Patient will be followed by physical therapy  5 times a week to address goals. Discharge Recommendations: Home Health  Further Equipment Recommendations for Discharge: RW, portable O2? SUBJECTIVE:   Patient stated you are a pusher aren't you.       OBJECTIVE DATA SUMMARY:   HISTORY:    Past Medical History:   Diagnosis Date    Alcohol abuse      Foot drop, left      Hypertension       Past Surgical History:   Procedure Laterality Date    HX HYSTERECTOMY        HX TONSILLECTOMY         Prior Level of Function/Home Situation: Independent, using a L foot AFO for distances longer than bed<>bathroom at night, working FT in accounting, not on home O2, driving, supportive spouse and family. Personal factors and/or comorbidities impacting plan of care:      Home Situation  Home Environment: Private residence  # Steps to Enter: 5  Rails to Enter: Yes  Hand Rails : Bilateral  Wheelchair Ramp: No  One/Two Story Residence: One story  Living Alone: No  Support Systems: Child(guido), Spouse/Significant Other/Partner  Patient Expects to be Discharged to[de-identified] Private residence  Current DME Used/Available at Home: Cane, straight, Other (comment) (AFO )  Tub or Shower Type: Shower     EXAMINATION/PRESENTATION/DECISION MAKING:   Critical Behavior:  Neurologic State: Alert  Orientation Level: Oriented X4  Cognition: Appropriate decision making  Safety/Judgement: Awareness of environment  Hearing: Auditory  Auditory Impairment: None  Range Of Motion:  AROM: Generally decreased, functional                       Strength:    Strength: Generally decreased, functional                    Tone & Sensation:   Tone: Normal              Sensation: Intact               Coordination:  Coordination: Generally decreased, functional (generalized tremors )  Vision:   Corrective Lenses: Glasses  Functional Mobility:  Bed Mobility:  Rolling: Maximum assistance  Supine to Sit: Maximum assistance     Scooting: Maximum assistance  Transfers:  Sit to Stand: Minimum assistance; Moderate assistance  Stand to Sit: Minimum assistance; Moderate assistance                       Balance:   Sitting: Intact; Without support  Standing: Intact; Without support  Ambulation/Gait Training:  Distance (ft): 60 Feet (ft)  Assistive Device: Gait belt;Walker, rolling  Ambulation - Level of Assistance: Contact guard assistance     Gait Description (WDL): Exceptions to WDL  Gait Abnormalities: Decreased step clearance;Shuffling gait (downward gaze )        Base of Support: Widened     Speed/Rita: Slow  Step Length: Right shortened;Left shortened               VC's to improve step length, height, posture and gaze                Functional Measure:  Barthel Index:      Bathin  Bladder: 0 (parker)  Bowels: 10  Groomin  Dressin  Feeding: 10  Mobility: 0  Stairs: 0  Toilet Use: 5  Transfer (Bed to Chair and Back): 10  Total: 45         Barthel and G-code impairment scale:  Percentage of impairment CH  0% CI  1-19% CJ  20-39% CK  40-59% CL  60-79% CM  80-99% CN  100%   Barthel Score 0-100 100 99-80 79-60 59-40 20-39 1-19    0   Barthel Score 0-20 20 17-19 13-16 9-12 5-8 1-4 0      The Barthel ADL Index: Guidelines  1. The index should be used as a record of what a patient does, not as a record of what a patient could do. 2. The main aim is to establish degree of independence from any help, physical or verbal, however minor and for whatever reason. 3. The need for supervision renders the patient not independent. 4. A patient's performance should be established using the best available evidence. Asking the patient, friends/relatives and nurses are the usual sources, but direct observation and common sense are also important. However direct testing is not needed. 5. Usually the patient's performance over the preceding 24-48 hours is important, but occasionally longer periods will be relevant. 6. Middle categories imply that the patient supplies over 50 per cent of the effort. 7. Use of aids to be independent is allowed. Delilah Avery., Barthel, D.W. (2389). Functional evaluation: the Barthel Index. [facility] (14)2. NARDA Ruff, Gurwinder Lopez., Daryl Abt., [facility], [address] [place] [address] (). Measuring the change indisability after inpatient rehabilitation; comparison of the responsiveness of the Barthel Index and Functional [place] Measure. Journal of Neurology, Neurosurgery, and Psychiatry, 66(4), 497-232.   Germain Morrow, NEberJ.A, TAURUS Chavez, Reji Ward, M.A. (2004.) Assessment of post-stroke quality of life in cost-effectiveness studies: The usefulness of the Barthel Index and the EuroQoL-5D. Quality of Life Research, 13, 999-35            G codes: In compliance with CMSs Claims Based Outcome Reporting, the following G-code set was chosen for this patient based on their primary functional limitation being treated: The outcome measure chosen to determine the severity of the functional limitation was the Barthel with a score of 45/100 which was correlated with the impairment scale. · Mobility - Walking and Moving Around:               - CURRENT STATUS:    CK - 40%-59% impaired, limited or restricted               - GOAL STATUS:           CI - 1%-19% impaired, limited or restricted               - D/C STATUS:                       ---------------To be determined---------------      Pain:  Pain Scale 1: Numeric (0 - 10)  Pain Intensity 1: 10  Pain Location 1: Abdomen  Pain Orientation 1: Right  Pain Description 1: Aching; Sharp  Pain Intervention(s) 1: Medication (see MAR)  Activity Tolerance:   Fair 2/2 pain intolerance      Please refer to the flowsheet for vital signs taken during this treatment. After treatment:   [X]         Patient left in no apparent distress sitting up in chair  [ ]         Patient left in no apparent distress in bed  [X]         Call bell left within reach  [X]         Nursing notified  [X]         Caregiver present  [X]         Bed alarm activated      COMMUNICATION/EDUCATION:   The patients plan of care was discussed with: Registered Nurse.  [X]         Fall prevention education was provided and the patient/caregiver indicated understanding. [X]         Patient/family have participated as able in goal setting and plan of care. [X]         Patient/family agree to work toward stated goals and plan of care. [ ]         Patient understands intent and goals of therapy, but is neutral about his/her participation.   [ ] Patient is unable to participate in goal setting and plan of care.      Thank you for this referral.  Rachele Buerger, PT, DPT    Time Calculation: 44 mins

## 2017-07-28 NOTE — PROGRESS NOTES
Bedside shift change report given to CAROLYNN Matson (oncoming nurse) by Efren Rodriguez RN (offgoing nurse). Report included the following information SBAR, Kardex, MAR, Recent Results and Cardiac Rhythm NSR.

## 2017-07-28 NOTE — PROGRESS NOTES
Admit Date: 2017    POD 2 Day Post-Op    Procedure:  Procedure(s):  LAPAROTOMY EXPLORATORY, REPAIR OF PERFORATED GASTRIC ULCER AND GRAM PATCH    Subjective:     Patient has complaints of pain. Objective:     Blood pressure 115/57, pulse 93, temperature 98.1 °F (36.7 °C), resp. rate 19, height 5' 3\" (1.6 m), weight 172 lb 6.4 oz (78.2 kg), SpO2 100 %. Temp (24hrs), Av.9 °F (36.6 °C), Min:97.2 °F (36.2 °C), Max:98.2 °F (36.8 °C)      Physical Exam:  GENERAL: alert, cooperative, no distress, appears stated age, LUNG: clear to auscultation bilaterally, HEART: regular rate and rhythm, ABDOMEN: soft, non-tender. Wound c/d/i, EXTREMITIES:  extremities normal, atraumatic, no cyanosis or edema    Labs:   No results found for this or any previous visit (from the past 24 hour(s)). Data Review images and reports reviewed    Assessment:     Principal Problem:    Perforated viscus (2017)    Active Problems:    Chronic alcoholism ([facility] [address] (2017)      MELBA (acute kidney injury) ([facility] [address] (2017)        Plan/Recommendations/Medical Decision Making:     Continue present treatment   Exceptionally high risk for EtOH w/d  Appreciate hospitalist assistance  Pt had initial consultation scheduled with Dr. Michelle Levy at [address] [place] [address] for next Friday. Continue NGT and strict NPO until Tuesday. H pylori pending  D/c elena Wise MD, [facility]

## 2017-07-28 NOTE — PROGRESS NOTES
Hospitalist Progress Note    NAME: Wicho Sullivan   :  1950   MRN:  [id number]       Assessment / Plan:  Alcohol abuse, POA  --drink 1.5L gin a week. High risk for withdrawal and DT. Last drink 17  --Continue ativan 1mg IV q8h with additional IV ativan 1-2mg prn depending CIWA score. --Continue daily goody bag.   --drinking cessation encouraged  --check B12 and Folate levels     Acute kidney injury, POA  --improving with IVF, Continue to monitor     Perforated prepyloric peptic ulcer, POA  Peritonitis Sepsis, POA due to perforated ulcer  --s/p exploratory laparotomy and repair of ulcer and Melvin patch 17  --continue IV PPI. H. Pylori Ab pending  --continue zosyn    --still with significant abd pain postop. No flatus     Hx of HTN  --BP currently normal without medication. Body mass index is 30.54 kg/(m^2). Subjective:     Chief Complaint / Reason for Physician Visit: follow-up alcohol withdrawal and ARF  Patient reports Abd pain, worse on right  Has not passed gas  No hx of DT's but does get \"testy\" when she doesn't drink    Review of Systems:  Symptom Y/N Comments  Symptom Y/N Comments   Fever/Chills    Chest Pain n    Poor Appetite    Edema n    Cough    Abdominal Pain     Sputum    Joint Pain     SOB/ABEBE y Upper abd pain on deep breathing (\"under my ribs\")  Pruritis/Rash     Nausea/vomit n But NG in place  Tolerating PT/OT     Diarrhea    Tolerating Diet     Constipation    Tremors y chronic     Could NOT obtain due to:      Objective:     VITALS:   Last 24hrs VS reviewed since prior progress note.  Most recent are:  Patient Vitals for the past 24 hrs:   Temp Pulse Resp BP SpO2   17 1113 98.1 °F (36.7 °C) 93 19 115/57 100 %   17 0701 97.7 °F (36.5 °C) 86 18 126/74 93 %   17 0235 98 °F (36.7 °C) 84 18 118/72 94 %   17 2239 98 °F (36.7 °C) 85 18 108/58 92 %   17 1913 98.2 °F (36.8 °C) 95 18 109/55 91 %   17 1537 97.2 °F (36.2 °C) 77 16 106/72 96 %       Intake/Output Summary (Last 24 hours) at 07/28/17 1355  Last data filed at 07/28/17 1342   Gross per 24 hour   Intake          3229.17 ml   Output             1510 ml   Net          1719.17 ml        PHYSICAL EXAM:  General: WD, Obese. Alert, cooperative, no acute distress    EENT:  EOMI. Anicteric sclerae. MM dry; NG in place  Resp:  CTA bilaterally, no wheezing or rales. No accessory muscle use  CV:  Regular  rhythm,  No edema  GI:  Did not palpate abdomen but bowel sounds hypoactive  Neurologic:  Alert and oriented X 3, normal speech,   Psych:   Fair insight. Not anxious nor agitated  Skin:  No rashes. No jaundice    Reviewed most current lab test results and cultures  YES  Reviewed most current radiology test results   YES  Review and summation of old records today    NO  Reviewed patient's current orders and MAR    YES  PMH/SH reviewed - no change compared to H&P  ________________________________________________________________________  Care Plan discussed with:    Comments   Patient x    Family  x    RN     Care Manager     Consultant                        Multidiciplinary team rounds were held today with , nursing, pharmacist and clinical coordinator. Patient's plan of care was discussed; medications were reviewed and discharge planning was addressed. ________________________________________________________________________  Total NON critical care TIME:  25   Minutes    Total CRITICAL CARE TIME Spent:   Minutes non procedure based      Comments   >50% of visit spent in counseling and coordination of care     ________________________________________________________________________  Rasheeda Galan MD     Procedures: see electronic medical records for all procedures/Xrays and details which were not copied into this note but were reviewed prior to creation of Plan. LABS:  I reviewed today's most current labs and imaging studies.   Pertinent labs include:  Recent Labs      07/27/17 0543  07/26/17 1739 07/25/17   1647   WBC  11.6*  14.0*  11.1*   HGB  12.3  13.0  13.4   HCT  37.1  39.4  39.5   PLT  178  213  193     Recent Labs      07/27/17 0543  07/26/17 2022 07/26/17 1739 07/25/17   1647   NA  136   --   136  137   K  4.4   --   3.8  3.5   CL  105   --   102  103   CO2  25   --   24  26   GLU  159*   --   161*  115*   BUN  29*   --   31*  29*   CREA  1.42*   --   1.86*  1.04*   CA  8.2*   --   8.8  9.0   ALB  3.0*   --   3.6  3.7   TBILI  0.6   --   0.4  0.6   SGOT  25   --   33  21   ALT  29   --   34  27   INR   --   0.9   --    --        Signed: Heather Chu MD

## 2017-07-28 NOTE — PROGRESS NOTES
Visited by Tosha Marino Partner 7/28/17.     [facility], [facility] Paging Service  [phone]

## 2017-07-29 LAB
ALBUMIN SERPL BCP-MCNC: 2.4 G/DL (ref 3.5–5)
ALBUMIN/GLOB SERPL: 0.6 {RATIO} (ref 1.1–2.2)
ALP SERPL-CCNC: 61 U/L (ref 45–117)
ALT SERPL-CCNC: 19 U/L (ref 12–78)
ANION GAP BLD CALC-SCNC: 10 MMOL/L (ref 5–15)
AST SERPL W P-5'-P-CCNC: 17 U/L (ref 15–37)
BASOPHILS # BLD AUTO: 0 K/UL (ref 0–0.1)
BASOPHILS # BLD: 0 % (ref 0–1)
BILIRUB SERPL-MCNC: 0.8 MG/DL (ref 0.2–1)
BUN SERPL-MCNC: 21 MG/DL (ref 6–20)
BUN/CREAT SERPL: 23 (ref 12–20)
CALCIUM SERPL-MCNC: 8.3 MG/DL (ref 8.5–10.1)
CHLORIDE SERPL-SCNC: 107 MMOL/L (ref 97–108)
CO2 SERPL-SCNC: 21 MMOL/L (ref 21–32)
CREAT SERPL-MCNC: 0.93 MG/DL (ref 0.55–1.02)
DIFFERENTIAL METHOD BLD: ABNORMAL
EOSINOPHIL # BLD: 0.1 K/UL (ref 0–0.4)
EOSINOPHIL NFR BLD: 1 % (ref 0–7)
ERYTHROCYTE [DISTWIDTH] IN BLOOD BY AUTOMATED COUNT: 12.5 % (ref 11.5–14.5)
FOLATE SERPL-MCNC: 27.1 NG/ML (ref 5–21)
GLOBULIN SER CALC-MCNC: 3.9 G/DL (ref 2–4)
GLUCOSE SERPL-MCNC: 92 MG/DL (ref 65–100)
HCT VFR BLD AUTO: 35.6 % (ref 35–47)
HGB BLD-MCNC: 11.9 G/DL (ref 11.5–16)
LYMPHOCYTES # BLD AUTO: 12 % (ref 12–49)
LYMPHOCYTES # BLD: 1.3 K/UL (ref 0.8–3.5)
MAGNESIUM SERPL-MCNC: 1.6 MG/DL (ref 1.6–2.4)
MCH RBC QN AUTO: 36.6 PG (ref 26–34)
MCHC RBC AUTO-ENTMCNC: 33.4 G/DL (ref 30–36.5)
MCV RBC AUTO: 109.5 FL (ref 80–99)
MONOCYTES # BLD: 0.9 K/UL (ref 0–1)
MONOCYTES NFR BLD AUTO: 8 % (ref 5–13)
NEUTS SEG # BLD: 8.4 K/UL (ref 1.8–8)
NEUTS SEG NFR BLD AUTO: 79 % (ref 32–75)
PHOSPHATE SERPL-MCNC: 2.1 MG/DL (ref 2.6–4.7)
PLATELET # BLD AUTO: 184 K/UL (ref 150–400)
POTASSIUM SERPL-SCNC: 3.2 MMOL/L (ref 3.5–5.1)
PROT SERPL-MCNC: 6.3 G/DL (ref 6.4–8.2)
RBC # BLD AUTO: 3.25 M/UL (ref 3.8–5.2)
RBC MORPH BLD: ABNORMAL
SODIUM SERPL-SCNC: 138 MMOL/L (ref 136–145)
VIT B12 SERPL-MCNC: 777 PG/ML (ref 211–911)
WBC # BLD AUTO: 10.7 K/UL (ref 3.6–11)

## 2017-07-29 PROCEDURE — 85025 COMPLETE CBC W/AUTO DIFF WBC: CPT | Performed by: INTERNAL MEDICINE

## 2017-07-29 PROCEDURE — 80053 COMPREHEN METABOLIC PANEL: CPT | Performed by: INTERNAL MEDICINE

## 2017-07-29 PROCEDURE — 83735 ASSAY OF MAGNESIUM: CPT | Performed by: INTERNAL MEDICINE

## 2017-07-29 PROCEDURE — 82746 ASSAY OF FOLIC ACID SERUM: CPT | Performed by: INTERNAL MEDICINE

## 2017-07-29 PROCEDURE — 74011000250 HC RX REV CODE- 250: Performed by: INTERNAL MEDICINE

## 2017-07-29 PROCEDURE — 74011250636 HC RX REV CODE- 250/636: Performed by: SURGERY

## 2017-07-29 PROCEDURE — 84100 ASSAY OF PHOSPHORUS: CPT | Performed by: INTERNAL MEDICINE

## 2017-07-29 PROCEDURE — 74011000258 HC RX REV CODE- 258: Performed by: SURGERY

## 2017-07-29 PROCEDURE — 97116 GAIT TRAINING THERAPY: CPT

## 2017-07-29 PROCEDURE — 97530 THERAPEUTIC ACTIVITIES: CPT

## 2017-07-29 PROCEDURE — 74011250636 HC RX REV CODE- 250/636: Performed by: HOSPITALIST

## 2017-07-29 PROCEDURE — 65270000029 HC RM PRIVATE

## 2017-07-29 PROCEDURE — 74011250636 HC RX REV CODE- 250/636: Performed by: INTERNAL MEDICINE

## 2017-07-29 PROCEDURE — 65660000000 HC RM CCU STEPDOWN

## 2017-07-29 PROCEDURE — C9113 INJ PANTOPRAZOLE SODIUM, VIA: HCPCS | Performed by: SURGERY

## 2017-07-29 PROCEDURE — 74011000250 HC RX REV CODE- 250: Performed by: HOSPITALIST

## 2017-07-29 PROCEDURE — 36415 COLL VENOUS BLD VENIPUNCTURE: CPT | Performed by: INTERNAL MEDICINE

## 2017-07-29 PROCEDURE — 74011000250 HC RX REV CODE- 250: Performed by: SURGERY

## 2017-07-29 PROCEDURE — 82607 VITAMIN B-12: CPT | Performed by: INTERNAL MEDICINE

## 2017-07-29 RX ADMIN — LORAZEPAM 1 MG: 2 INJECTION INTRAMUSCULAR; INTRAVENOUS at 21:13

## 2017-07-29 RX ADMIN — HYDROMORPHONE HYDROCHLORIDE 0.5 MG: 1 INJECTION, SOLUTION INTRAMUSCULAR; INTRAVENOUS; SUBCUTANEOUS at 08:22

## 2017-07-29 RX ADMIN — PIPERACILLIN SODIUM,TAZOBACTAM SODIUM 3.38 G: 3; .375 INJECTION, POWDER, FOR SOLUTION INTRAVENOUS at 21:13

## 2017-07-29 RX ADMIN — PIPERACILLIN SODIUM,TAZOBACTAM SODIUM 3.38 G: 3; .375 INJECTION, POWDER, FOR SOLUTION INTRAVENOUS at 05:24

## 2017-07-29 RX ADMIN — FOLIC ACID: 5 INJECTION, SOLUTION INTRAMUSCULAR; INTRAVENOUS; SUBCUTANEOUS at 16:37

## 2017-07-29 RX ADMIN — HYDROMORPHONE HYDROCHLORIDE 0.5 MG: 1 INJECTION, SOLUTION INTRAMUSCULAR; INTRAVENOUS; SUBCUTANEOUS at 06:28

## 2017-07-29 RX ADMIN — HYDROMORPHONE HYDROCHLORIDE 0.5 MG: 1 INJECTION, SOLUTION INTRAMUSCULAR; INTRAVENOUS; SUBCUTANEOUS at 10:50

## 2017-07-29 RX ADMIN — HYDROMORPHONE HYDROCHLORIDE 0.5 MG: 1 INJECTION, SOLUTION INTRAMUSCULAR; INTRAVENOUS; SUBCUTANEOUS at 03:28

## 2017-07-29 RX ADMIN — LORAZEPAM 1 MG: 2 INJECTION INTRAMUSCULAR; INTRAVENOUS at 05:24

## 2017-07-29 RX ADMIN — POTASSIUM PHOSPHATE, MONOBASIC AND POTASSIUM PHOSPHATE, DIBASIC: 224; 236 INJECTION, SOLUTION INTRAVENOUS at 06:19

## 2017-07-29 RX ADMIN — PIPERACILLIN SODIUM,TAZOBACTAM SODIUM 3.38 G: 3; .375 INJECTION, POWDER, FOR SOLUTION INTRAVENOUS at 13:37

## 2017-07-29 RX ADMIN — Medication 10 ML: at 16:38

## 2017-07-29 RX ADMIN — SODIUM CHLORIDE 40 MG: 9 INJECTION INTRAMUSCULAR; INTRAVENOUS; SUBCUTANEOUS at 08:23

## 2017-07-29 RX ADMIN — HYDROMORPHONE HYDROCHLORIDE 0.5 MG: 1 INJECTION, SOLUTION INTRAMUSCULAR; INTRAVENOUS; SUBCUTANEOUS at 18:58

## 2017-07-29 RX ADMIN — Medication 10 ML: at 21:14

## 2017-07-29 RX ADMIN — SODIUM CHLORIDE 40 MG: 9 INJECTION INTRAMUSCULAR; INTRAVENOUS; SUBCUTANEOUS at 21:12

## 2017-07-29 RX ADMIN — HYDROMORPHONE HYDROCHLORIDE 0.5 MG: 1 INJECTION, SOLUTION INTRAMUSCULAR; INTRAVENOUS; SUBCUTANEOUS at 15:06

## 2017-07-29 RX ADMIN — Medication 10 ML: at 05:24

## 2017-07-29 RX ADMIN — HYDROMORPHONE HYDROCHLORIDE 0.5 MG: 1 INJECTION, SOLUTION INTRAMUSCULAR; INTRAVENOUS; SUBCUTANEOUS at 22:11

## 2017-07-29 RX ADMIN — Medication 10 ML: at 06:28

## 2017-07-29 NOTE — PROGRESS NOTES
Hospitalist Progress Note    NAME: Víctor Barajas   :  1950   MRN:  [id number]       Assessment / Plan:  Alcohol abuse, POA  --drink 1.5L gin a week. High risk for withdrawal and DT. Last drink 17  --Continue ativan 1mg IV q8h (working well as patient has not needed CIWA coverage); I no CIWA coverage needed over next 24 hours will discontinue alcohol withdrawal protocol  --Continue daily goody bag.   --drinking cessation encouraged in presence of  on   --B12 and Folate wnl     Acute kidney injury, POA; resolved with IVF's  --Continue to monitor     Perforated prepyloric peptic ulcer, POA  Peritonitis causing Sepsis, POA due to perforated ulcer  --s/p exploratory laparotomy and repair of ulcer and Melvin patch 17  --continue IV PPI. H. Pylori Ab panel with negative IgG and +IgA; I have researched this antibody pattern given the clinical scenario I will place patient on treatment for H. Pylori once she begins taking PO  --continue zosyn    --still with significant abd pain postop     Hx of HTN  --BP currently normal without medication. Hypokalemia:  Hypophosphatemia:  -replete with IV potassium phosphate  -monitor electrolytes closely since NPO    Body mass index is 30.54 kg/(m^2).        Subjective:     Chief Complaint / Reason for Physician Visit: follow-up alcohol withdrawal and ARF  Patient has just gotten to chair right before I entered room and is in significant pain  She states that she has has passed flatus  Per my discussion with RN patient has not needed CIWA coverage    Review of Systems:  Symptom Y/N Comments  Symptom Y/N Comments   Fever/Chills    Chest Pain n    Poor Appetite    Edema n    Cough    Abdominal Pain y    Sputum    Joint Pain     SOB/ABEBE y Due to abd pain  Pruritis/Rash     Nausea/vomit n NG remains in place  Tolerating PT/OT     Diarrhea    Tolerating Diet     Constipation    Tremors       Could NOT obtain due to:      Objective:     VITALS:   Last 24hrs VS reviewed since prior progress note. Most recent are:  Patient Vitals for the past 24 hrs:   Temp Pulse Resp BP SpO2   07/29/17 0742 98.5 °F (36.9 °C) 89 18 143/77 97 %   07/29/17 0327 99.5 °F (37.5 °C) 99 18 134/68 94 %   07/28/17 2225 98.9 °F (37.2 °C) 91 16 142/81 92 %   07/28/17 1917 98.6 °F (37 °C) 91 16 145/86 95 %   07/28/17 1454 97.9 °F (36.6 °C) 91 19 123/68 100 %   07/28/17 1113 98.1 °F (36.7 °C) 93 19 115/57 100 %       Intake/Output Summary (Last 24 hours) at 07/29/17 0949  Last data filed at 07/29/17 0814   Gross per 24 hour   Intake          1402.09 ml   Output             2030 ml   Net          -627.91 ml        PHYSICAL EXAM:  General: WD, Obese. Alert, cooperative, no acute distress    EENT:  EOMI. Anicteric sclerae. MM dry; NG in place  Resp:  CTA bilaterally, no wheezing or rales. No accessory muscle use  CV:  Regular  rhythm,  No edema  GI:  Did not palpate abdomen but bowel sounds hypoactive  Neurologic:  Alert and oriented X 3, normal speech,   Psych:   Fair insight. Not anxious nor agitated  Skin:  No rashes. No jaundice    Reviewed most current lab test results and cultures  YES  Reviewed most current radiology test results   YES  Review and summation of old records today    NO  Reviewed patient's current orders and MAR    YES  PMH/ reviewed - no change compared to H&P  ________________________________________________________________________  Care Plan discussed with:    Comments   Patient x    Family      RN x    Care Manager     Consultant                        Multidiciplinary team rounds were held today with , nursing, pharmacist and clinical coordinator. Patient's plan of care was discussed; medications were reviewed and discharge planning was addressed.      ________________________________________________________________________  Total NON critical care TIME:  20   Minutes    Total CRITICAL CARE TIME Spent:   Minutes non procedure based      Comments   >50% of visit spent in counseling and coordination of care     ________________________________________________________________________  Danni Puentes MD     Procedures: see electronic medical records for all procedures/Xrays and details which were not copied into this note but were reviewed prior to creation of Plan. LABS:  I reviewed today's most current labs and imaging studies.   Pertinent labs include:  Recent Labs      07/29/17 0337 07/27/17   0543  07/26/17   1739   WBC  10.7  11.6*  14.0*   HGB  11.9  12.3  13.0   HCT  35.6  37.1  39.4   PLT  184  178  213     Recent Labs      07/29/17   0337  07/27/17   0543  07/26/17 2022 07/26/17   1739   NA  138  136   --   136   K  3.2*  4.4   --   3.8   CL  107  105   --   102   CO2  21  25   --   24   GLU  92  159*   --   161*   BUN  21*  29*   --   31*   CREA  0.93  1.42*   --   1.86*   CA  8.3*  8.2*   --   8.8   MG  1.6   --    --    --    PHOS  2.1*   --    --    --    ALB  2.4*  3.0*   --   3.6   TBILI  0.8  0.6   --   0.4   SGOT  17  25   --   33   ALT  19  29   --   34   INR   --    --   0.9   --        Signed: Danni Puentes MD

## 2017-07-29 NOTE — PROGRESS NOTES
[address] SHIFT NURSING NOTE    Bedside and Verbal shift change report given to [facility] (oncoming nurse) by Tia Beasley (offgoing nurse). Report included the following information SBAR, Kardex, ED Summary, Procedure Summary, Intake/Output, MAR, Recent Results and Cardiac Rhythm NSR.    SHIFT SUMMARY:   Uneventful shift.

## 2017-07-29 NOTE — PROGRESS NOTES
[address] SHIFT NURSING NOTE    Bedside shift change report given to Kaia Dave (oncoming nurse) by Jamal Dodge (offgoing nurse). Report included the following information SBAR, Kardex, Intake/Output, MAR and Recent Results. SHIFT SUMMARY:         Admission Date 7/26/2017   Admission Diagnosis perperated viscous  Perforated viscus   Consults IP CONSULT TO HOSPITALIST        Consults   [x] PT   [x] OT   [] Speech   [] Palliative      [] Hospice    [x] Case Management   [] None   Cardiac Monitoring   [x] Yes   [] No     Antibiotics   [x] Yes   [] No   GI Prophylaxis  (Ex: Protonix, Pepcid, etc,.)   [x] Yes   [] No          DVT Prophylaxis   SCDs:  Sequential Compression Device: Bilateral          Jose stockings:         [] Medication (Ex: Lovenox, Eliquis, Brilinta, Coumadin,  Heparin, etc..)   [] Contraindicated   [x] No VTE needed       Urinary Catheter [REMOVED] Urinary Catheter 07/26/17 [place]; 2- way-Criteria for Appropriate Use: Surgical procedure     [REMOVED] Urinary Catheter 07/26/17 [place]; 2- way-Urine Output (mL): 250 ml     LDAs               Peripheral IV 07/26/17 Right Antecubital (Active)   Site Assessment Clean, dry, & intact 7/29/2017  7:49 PM   Phlebitis Assessment 0 7/29/2017  7:49 PM   Infiltration Assessment 0 7/29/2017  7:49 PM   Dressing Status Clean, dry, & intact; Clean 7/29/2017  7:49 PM   Dressing Type Transparent 7/29/2017  7:49 PM   Hub Color/Line Status Pink; Infusing 7/29/2017  7:49 PM   Action Taken Blood drawn 7/26/2017  5:46 PM          Rudolph-Snider Drain 07/26/17 Abdomen (Active)   Site Assessment Clean, dry, & intact 7/29/2017  7:50 PM   Dressing Status Clean, dry, & intact 7/29/2017  7:50 PM   Status Charged;Draining 7/29/2017  7:50 PM   Drainage Color Serosanguinous 7/29/2017  7:50 PM   Output (ml) 0 ml 7/29/2017  7:50 PM       Nasogastric Tube 07/26/17 (Active)   Site Assessment Clean, dry, & intact 7/29/2017  7:49 PM   Dressing Status Clean, dry, & intact 7/29/2017  7:49 PM   G Port Status Continuous Suction 7/29/2017  7:49 PM   External Insertion Tommy (cms) 73 cms 7/29/2017  7:49 PM   Action Taken Placement verified (comment) 7/29/2017  7:49 PM   Drainage Description Green 7/29/2017  7:49 PM   Drainage Chamber Level (ml) 700 ml 7/29/2017  5:22 PM   Output (ml) 200 ml 7/29/2017  5:22 PM                I/Os   Intake/Output Summary (Last 24 hours) at 07/29/17 1952  Last data filed at 07/29/17 1950   Gross per 24 hour   Intake          2908. 33 ml   Output             1690 ml   Net          1218.33 ml         Activity Level Activity Level: Up with Assistance     Activity Assistance: Partial (one person)   Diet Active Orders   Diet    DIET NPO      Purposeful Rounding every 1-2 hour? [x] Yes    Moni Score  Total Score: 3   Bed Alarm (If score 3 or >)   [x] Yes    [] Refused (See signed refusal form in chart)   Darrel Score  Darrel Score: 17       Darrel Score (if score 14 or less)   [] PMT consult   [] Nutrition consult   [] Wound Care consult      []  Specialty bed         Influenza Vaccine Received Flu Vaccine for Current Season (usually Sept-March): Not Flu Season               Needs prior to discharge:   Home O2 required:    [] Yes   [x] No     If yes, how much O2 required? Other:    Last Bowel Movement Date: 07/26/17   Readmission Risk Assessment Tool Score Low Risk            10       Total Score        3 Has Seen PCP in Last 6 Months (Yes=3, No=0)    2 . Living with Significant Other. Assisted Living. LTAC. SNF. or   Rehab    5 Pt.  Coverage (Medicare=5 , Medicaid, or Self-Pay=4)        Criteria that do not apply:    Patient Length of Stay (>5 days = 3)    IP Visits Last 12 Months (1-3=4, 4=9, >4=11)    Charlson Comorbidity Score (Age + Comorbid Conditions)       Expected Length of Stay 10d 12h   Actual Length of Stay 3

## 2017-07-29 NOTE — PROGRESS NOTES
Bedside shift change report given to Adry Burdick RN (oncoming nurse) by Radu Rodriguez RN (offgoing nurse). Report included the following information SBAR, Kardex, MAR, Recent Results and Cardiac Rhythm NSR.      6:30 AM-Spoke with pharmacist about Zosyn and potassium phosphate compatibility; said it was okay to run concurrent.

## 2017-07-29 NOTE — PROGRESS NOTES
Problem: Mobility Impaired (Adult and Pediatric)  Goal: *Acute Goals and Plan of Care (Insert Text)  Physical Therapy Goals  Initiated 7/28/2017  1. Patient will move from supine to sit and sit to supine , scoot up and down and roll side to side in bed with supervision/set-up within 7 day(s). 2. Patient will transfer from bed to chair and chair to bed with supervision/set-up using the least restrictive device within 7 day(s). 3. Patient will perform sit to stand with supervision/set-up within 7 day(s). 4. Patient will ambulate with supervision/set-up for 150 feet with the least restrictive device within 7 day(s). 5. Patient will ascend/descend 4 stairs with dual handrail(s) with minimal assistance/contact guard assist within 7 day(s). PHYSICAL THERAPY TREATMENT  Patient: Charleen Goel (28 y.o. female)  Date: 7/29/2017  Diagnosis: perperated viscous  Perforated viscus Perforated viscus  Procedure(s) (LRB):  LAPAROTOMY EXPLORATORY, REPAIR OF PERFORATED GASTRIC ULCER AND GRAM PATCH (Bilateral) 3 Days Post-Op  Precautions:        ASSESSMENT:  Patient continues to present with significant pain, weakness, that impacts mobility despite prior administration of IV medication by RN. She was received at bedside, drowsy, supportive spouse present. Mobilized with up to min A required for balance checks seen upon first stand, during gait period noted below with efforts hampered by pain tolerance. She demonstrated short shuffled steps that did not largely improve with multi-modal cueing. She tended to hyperventilate, or hold breathe with activity with 4L NCO2 utilized during activity. Upon sitting in chair in abreu (after chair follow), patient stood and urinated likely due to urge incontinence with return trip to bathroom for hygiene and toileting tasks. Concluded in chair with all needs met and continued emphasis on activity as tolerated ahead.  She will certainly need to improve in her activity tolerance, functioning prior to DC to home. Progression toward goals:  [ ]    Improving appropriately and progressing toward goals  [X]    Improving slowly and progressing toward goals  [ ]    Not making progress toward goals and plan of care will be adjusted       PLAN:  Patient continues to benefit from skilled intervention to address the above impairments. Continue treatment per established plan of care. Discharge Recommendations:  Home health? Rehab may need to be considered if function degrades   Further Equipment Recommendations for Discharge:  defer       SUBJECTIVE:   Patient stated I'll do it.       OBJECTIVE DATA SUMMARY:   Critical Behavior:  Neurologic State: Alert  Orientation Level: Oriented X4  Cognition: Appropriate for age attention/concentration, Appropriate decision making, Follows commands  Safety/Judgement: Awareness of environment  Functional Mobility Training:  Bed Mobility:  Rolling:  (AT CHAIR)                 Transfers:  Sit to Stand: Contact guard assistance;Minimum assistance  Stand to Sit: Minimum assistance                             Balance:  Sitting: Intact; Without support  Standing: Impaired; With support  Standing - Static: Good;Fair;Constant support  Standing - Dynamic : Good  Ambulation/Gait Training:  Distance (ft): 80 Feet (ft)  Assistive Device: Gait belt;Walker, rolling  Ambulation - Level of Assistance: Contact guard assistance;Minimal assistance (min A for balance checks)                 Base of Support: Widened     Speed/Rita: Slow  Step Length: Right shortened;Left shortened                             Verbal, tactile, manual cueing for step lengths, height, with use of shoes and L AFO  Pain:  Pain Scale 1: Numeric (0 - 10)  Pain Intensity 1: 7  Pain Location 1: Abdomen  Pain Orientation 1: Right  Pain Description 1: Aching; Sharp  Pain Intervention(s) 1: Medication (see MAR)  Activity Tolerance:   Fair 2/2 pain      Please refer to the flowsheet for vital signs taken during this treatment.   After treatment:   [X]    Patient left in no apparent distress sitting up in chair  [ ]    Patient left in no apparent distress in bed  [X]    Call bell left within reach  [X]    Nursing notified  [X]    Caregiver present  [ ]    Bed alarm activated      COMMUNICATION/COLLABORATION:   The patients plan of care was discussed with: Registered Nurse     Mary Collier, PT, DPT    Time Calculation: 30 mins

## 2017-07-29 NOTE — PROGRESS NOTES
Admit Date: 2017    POD 3 Day Post-Op    Procedure:  Procedure(s):  LAPAROTOMY EXPLORATORY, REPAIR OF PERFORATED GASTRIC ULCER AND GRAM PATCH    Subjective:     Patient has complaints of pain. Not ambulating. Objective:     Blood pressure 127/72, pulse (!) 104, temperature 98.3 °F (36.8 °C), resp. rate 18, height 5' 3\" (1.6 m), weight 172 lb 6.4 oz (78.2 kg), SpO2 96 %. Temp (24hrs), Av.8 °F (37.1 °C), Min:98.3 °F (36.8 °C), Max:99.5 °F (37.5 °C)      Physical Exam:  GENERAL: alert, cooperative, no distress, appears stated age, LUNG: clear to auscultation bilaterally, HEART: regular rate and rhythm, ABDOMEN: soft, non-tender. Wound c/d/i, EXTREMITIES:  extremities normal, atraumatic, no cyanosis or edema    Labs:   Recent Results (from the past 24 hour(s))   METABOLIC PANEL, COMPREHENSIVE    Collection Time: 17  3:37 AM   Result Value Ref Range    Sodium 138 136 - 145 mmol/L    Potassium 3.2 (L) 3.5 - 5.1 mmol/L    Chloride 107 97 - 108 mmol/L    CO2 21 21 - 32 mmol/L    Anion gap 10 5 - 15 mmol/L    Glucose 92 65 - 100 mg/dL    BUN 21 (H) 6 - 20 MG/DL    Creatinine 0.93 0.55 - 1.02 MG/DL    BUN/Creatinine ratio 23 (H) 12 - 20      GFR est AA >60 >60 ml/min/1.73m2    GFR est non-AA >60 >60 ml/min/1.73m2    Calcium 8.3 (L) 8.5 - 10.1 MG/DL    Bilirubin, total 0.8 0.2 - 1.0 MG/DL    ALT (SGPT) 19 12 - 78 U/L    AST (SGOT) 17 15 - 37 U/L    Alk.  phosphatase 61 45 - 117 U/L    Protein, total 6.3 (L) 6.4 - 8.2 g/dL    Albumin 2.4 (L) 3.5 - 5.0 g/dL    Globulin 3.9 2.0 - 4.0 g/dL    A-G Ratio 0.6 (L) 1.1 - 2.2     CBC WITH AUTOMATED DIFF    Collection Time: 17  3:37 AM   Result Value Ref Range    WBC 10.7 3.6 - 11.0 K/uL    RBC 3.25 (L) 3.80 - 5.20 M/uL    HGB 11.9 11.5 - 16.0 g/dL    HCT 35.6 35.0 - 47.0 %    .5 (H) 80.0 - 99.0 FL    MCH 36.6 (H) 26.0 - 34.0 PG    MCHC 33.4 30.0 - 36.5 g/dL    RDW 12.5 11.5 - 14.5 %    PLATELET 472 110 - 347 K/uL    NEUTROPHILS 79 (H) 32 - 75 % LYMPHOCYTES 12 12 - 49 %    MONOCYTES 8 5 - 13 %    EOSINOPHILS 1 0 - 7 %    BASOPHILS 0 0 - 1 %    ABS. NEUTROPHILS 8.4 (H) 1.8 - 8.0 K/UL    ABS. LYMPHOCYTES 1.3 0.8 - 3.5 K/UL    ABS. MONOCYTES 0.9 0.0 - 1.0 K/UL    ABS. EOSINOPHILS 0.1 0.0 - 0.4 K/UL    ABS. BASOPHILS 0.0 0.0 - 0.1 K/UL    RBC COMMENTS MACROCYTOSIS      DF SMEAR SCANNED     MAGNESIUM    Collection Time: 07/29/17  3:37 AM   Result Value Ref Range    Magnesium 1.6 1.6 - 2.4 mg/dL   PHOSPHORUS    Collection Time: 07/29/17  3:37 AM   Result Value Ref Range    Phosphorus 2.1 (L) 2.6 - 4.7 MG/DL   VITAMIN B12    Collection Time: 07/29/17  3:37 AM   Result Value Ref Range    Vitamin B12 777 211 - 911 pg/mL   FOLATE    Collection Time: 07/29/17  3:37 AM   Result Value Ref Range    Folate 27.1 (H) 5.0 - 21.0 ng/mL       Data Review images and reports reviewed    Assessment:     Principal Problem:    Perforated viscus (7/26/2017)    Active Problems:    Chronic alcoholism ([facility] [address] (7/26/2017)      MELBA (acute kidney injury) ([facility] [address] (7/26/2017)        Plan/Recommendations/Medical Decision Making:     Continue present treatment   Exceptionally high risk for EtOH w/d  Appreciate hospitalist assistance  Pt had initial consultation scheduled with Dr. Agueda Bae at [facility] for next Friday. Continue NGT and strict NPO until Tuesday. H pylori IGA+, will treat once taking po      Tommy Bond MD, [facility]

## 2017-07-30 LAB
ABO + RH BLD: NORMAL
ALBUMIN SERPL BCP-MCNC: 2.3 G/DL (ref 3.5–5)
ALBUMIN/GLOB SERPL: 0.6 {RATIO} (ref 1.1–2.2)
ALP SERPL-CCNC: 70 U/L (ref 45–117)
ALT SERPL-CCNC: 22 U/L (ref 12–78)
ANION GAP BLD CALC-SCNC: 12 MMOL/L (ref 5–15)
AST SERPL W P-5'-P-CCNC: 24 U/L (ref 15–37)
BASOPHILS # BLD AUTO: 0 K/UL (ref 0–0.1)
BASOPHILS # BLD: 0 % (ref 0–1)
BILIRUB SERPL-MCNC: 1.1 MG/DL (ref 0.2–1)
BLD PROD TYP BPU: NORMAL
BLD PROD TYP BPU: NORMAL
BLOOD GROUP ANTIBODIES SERPL: NORMAL
BPU ID: NORMAL
BPU ID: NORMAL
BUN SERPL-MCNC: 18 MG/DL (ref 6–20)
BUN/CREAT SERPL: 22 (ref 12–20)
CALCIUM SERPL-MCNC: 8.3 MG/DL (ref 8.5–10.1)
CHLORIDE SERPL-SCNC: 107 MMOL/L (ref 97–108)
CO2 SERPL-SCNC: 22 MMOL/L (ref 21–32)
CREAT SERPL-MCNC: 0.82 MG/DL (ref 0.55–1.02)
CROSSMATCH RESULT,%XM: NORMAL
CROSSMATCH RESULT,%XM: NORMAL
DIFFERENTIAL METHOD BLD: ABNORMAL
EOSINOPHIL # BLD: 0.1 K/UL (ref 0–0.4)
EOSINOPHIL NFR BLD: 1 % (ref 0–7)
ERYTHROCYTE [DISTWIDTH] IN BLOOD BY AUTOMATED COUNT: 12.8 % (ref 11.5–14.5)
GLOBULIN SER CALC-MCNC: 4 G/DL (ref 2–4)
GLUCOSE SERPL-MCNC: 78 MG/DL (ref 65–100)
HCT VFR BLD AUTO: 35.2 % (ref 35–47)
HGB BLD-MCNC: 11.6 G/DL (ref 11.5–16)
LYMPHOCYTES # BLD AUTO: 17 % (ref 12–49)
LYMPHOCYTES # BLD: 2.1 K/UL (ref 0.8–3.5)
MAGNESIUM SERPL-MCNC: 1.6 MG/DL (ref 1.6–2.4)
MCH RBC QN AUTO: 36 PG (ref 26–34)
MCHC RBC AUTO-ENTMCNC: 33 G/DL (ref 30–36.5)
MCV RBC AUTO: 109.3 FL (ref 80–99)
MONOCYTES # BLD: 0.8 K/UL (ref 0–1)
MONOCYTES NFR BLD AUTO: 6 % (ref 5–13)
NEUTS SEG # BLD: 9.6 K/UL (ref 1.8–8)
NEUTS SEG NFR BLD AUTO: 76 % (ref 32–75)
PHOSPHATE SERPL-MCNC: 3.1 MG/DL (ref 2.6–4.7)
PLATELET # BLD AUTO: 174 K/UL (ref 150–400)
POTASSIUM SERPL-SCNC: 3.3 MMOL/L (ref 3.5–5.1)
PROT SERPL-MCNC: 6.3 G/DL (ref 6.4–8.2)
RBC # BLD AUTO: 3.22 M/UL (ref 3.8–5.2)
RBC MORPH BLD: ABNORMAL
SODIUM SERPL-SCNC: 141 MMOL/L (ref 136–145)
SPECIMEN EXP DATE BLD: NORMAL
STATUS OF UNIT,%ST: NORMAL
STATUS OF UNIT,%ST: NORMAL
UNIT DIVISION, %UDIV: 0
UNIT DIVISION, %UDIV: 0
WBC # BLD AUTO: 12.6 K/UL (ref 3.6–11)
WBC MORPH BLD: ABNORMAL

## 2017-07-30 PROCEDURE — 74011000258 HC RX REV CODE- 258: Performed by: SURGERY

## 2017-07-30 PROCEDURE — 74011000250 HC RX REV CODE- 250: Performed by: HOSPITALIST

## 2017-07-30 PROCEDURE — 77010033678 HC OXYGEN DAILY

## 2017-07-30 PROCEDURE — 74011250636 HC RX REV CODE- 250/636: Performed by: INTERNAL MEDICINE

## 2017-07-30 PROCEDURE — 74011250636 HC RX REV CODE- 250/636: Performed by: HOSPITALIST

## 2017-07-30 PROCEDURE — C9113 INJ PANTOPRAZOLE SODIUM, VIA: HCPCS | Performed by: SURGERY

## 2017-07-30 PROCEDURE — 83735 ASSAY OF MAGNESIUM: CPT | Performed by: INTERNAL MEDICINE

## 2017-07-30 PROCEDURE — 74011000250 HC RX REV CODE- 250: Performed by: SURGERY

## 2017-07-30 PROCEDURE — 36415 COLL VENOUS BLD VENIPUNCTURE: CPT | Performed by: INTERNAL MEDICINE

## 2017-07-30 PROCEDURE — 80053 COMPREHEN METABOLIC PANEL: CPT | Performed by: INTERNAL MEDICINE

## 2017-07-30 PROCEDURE — 65270000029 HC RM PRIVATE

## 2017-07-30 PROCEDURE — 77030019563 HC DEV ATTCH FEED HOLL -A

## 2017-07-30 PROCEDURE — 85025 COMPLETE CBC W/AUTO DIFF WBC: CPT | Performed by: INTERNAL MEDICINE

## 2017-07-30 PROCEDURE — 84100 ASSAY OF PHOSPHORUS: CPT | Performed by: INTERNAL MEDICINE

## 2017-07-30 PROCEDURE — 74011250636 HC RX REV CODE- 250/636: Performed by: SURGERY

## 2017-07-30 RX ORDER — POTASSIUM CHLORIDE 7.45 MG/ML
10 INJECTION INTRAVENOUS
Status: COMPLETED | OUTPATIENT
Start: 2017-07-30 | End: 2017-08-01

## 2017-07-30 RX ADMIN — HYDROMORPHONE HYDROCHLORIDE 0.5 MG: 1 INJECTION, SOLUTION INTRAMUSCULAR; INTRAVENOUS; SUBCUTANEOUS at 00:32

## 2017-07-30 RX ADMIN — LORAZEPAM 1 MG: 2 INJECTION INTRAMUSCULAR; INTRAVENOUS at 15:38

## 2017-07-30 RX ADMIN — HYDROMORPHONE HYDROCHLORIDE 0.5 MG: 1 INJECTION, SOLUTION INTRAMUSCULAR; INTRAVENOUS; SUBCUTANEOUS at 11:07

## 2017-07-30 RX ADMIN — SODIUM CHLORIDE 125 ML/HR: 900 INJECTION, SOLUTION INTRAVENOUS at 00:32

## 2017-07-30 RX ADMIN — PIPERACILLIN SODIUM,TAZOBACTAM SODIUM 3.38 G: 3; .375 INJECTION, POWDER, FOR SOLUTION INTRAVENOUS at 04:31

## 2017-07-30 RX ADMIN — POTASSIUM CHLORIDE 10 MEQ: 10 INJECTION, SOLUTION INTRAVENOUS at 08:34

## 2017-07-30 RX ADMIN — HYDROMORPHONE HYDROCHLORIDE 0.5 MG: 1 INJECTION, SOLUTION INTRAMUSCULAR; INTRAVENOUS; SUBCUTANEOUS at 21:08

## 2017-07-30 RX ADMIN — HYDROMORPHONE HYDROCHLORIDE 0.5 MG: 1 INJECTION, SOLUTION INTRAMUSCULAR; INTRAVENOUS; SUBCUTANEOUS at 08:35

## 2017-07-30 RX ADMIN — POTASSIUM CHLORIDE 10 MEQ: 10 INJECTION, SOLUTION INTRAVENOUS at 10:06

## 2017-07-30 RX ADMIN — PIPERACILLIN SODIUM,TAZOBACTAM SODIUM 3.38 G: 3; .375 INJECTION, POWDER, FOR SOLUTION INTRAVENOUS at 21:08

## 2017-07-30 RX ADMIN — Medication 10 ML: at 04:32

## 2017-07-30 RX ADMIN — SODIUM CHLORIDE 40 MG: 9 INJECTION INTRAMUSCULAR; INTRAVENOUS; SUBCUTANEOUS at 21:08

## 2017-07-30 RX ADMIN — Medication 10 ML: at 15:38

## 2017-07-30 RX ADMIN — HYDROMORPHONE HYDROCHLORIDE 0.5 MG: 1 INJECTION, SOLUTION INTRAMUSCULAR; INTRAVENOUS; SUBCUTANEOUS at 06:22

## 2017-07-30 RX ADMIN — HYDROMORPHONE HYDROCHLORIDE 0.5 MG: 1 INJECTION, SOLUTION INTRAMUSCULAR; INTRAVENOUS; SUBCUTANEOUS at 03:12

## 2017-07-30 RX ADMIN — LORAZEPAM 1 MG: 2 INJECTION INTRAMUSCULAR; INTRAVENOUS at 05:03

## 2017-07-30 RX ADMIN — FOLIC ACID: 5 INJECTION, SOLUTION INTRAMUSCULAR; INTRAVENOUS; SUBCUTANEOUS at 11:47

## 2017-07-30 RX ADMIN — POTASSIUM CHLORIDE 10 MEQ: 10 INJECTION, SOLUTION INTRAVENOUS at 11:07

## 2017-07-30 RX ADMIN — LORAZEPAM 1 MG: 2 INJECTION INTRAMUSCULAR; INTRAVENOUS at 21:09

## 2017-07-30 RX ADMIN — POTASSIUM CHLORIDE 10 MEQ: 10 INJECTION, SOLUTION INTRAVENOUS at 06:45

## 2017-07-30 RX ADMIN — HYDROMORPHONE HYDROCHLORIDE 0.5 MG: 1 INJECTION, SOLUTION INTRAMUSCULAR; INTRAVENOUS; SUBCUTANEOUS at 13:18

## 2017-07-30 RX ADMIN — SODIUM CHLORIDE 40 MG: 9 INJECTION INTRAMUSCULAR; INTRAVENOUS; SUBCUTANEOUS at 08:34

## 2017-07-30 RX ADMIN — PIPERACILLIN SODIUM,TAZOBACTAM SODIUM 3.38 G: 3; .375 INJECTION, POWDER, FOR SOLUTION INTRAVENOUS at 13:18

## 2017-07-30 NOTE — PROGRESS NOTES
Pt c/o 8/10 abdominal pain, but when nurse returned to room with pain medicine patient is asleep,  Arousable, but falls back to sleep easily,  Pain medicine not given,  Facial expression very relaxed,  resp even and unlabored,  Oxygen on at 2L/min N/C. Will continue to monitor.

## 2017-07-30 NOTE — PROGRESS NOTES
Hospitalist Progress Note    NAME: Gregg Gomez   :  1950   MRN:  [id number]       Assessment / Plan:  Alcohol abuse, POA  --drinks 3L of gin a week. High risk for withdrawal.  Last drink 17  --Continue ativan 1mg IV q8h (working well as patient has not needed CIWA coverage for last 48 hours thus I have discontinue CIWA  --Continue daily goody bag while NPO  --drinking cessation encouraged in presence of  on  and again on      Acute kidney injury, POA; resolved with IVF's  --Continue to monitor     Perforated prepyloric peptic ulcer, POA  Peritonitis causing Sepsis, POA due to perforated ulcer  --s/p exploratory laparotomy and repair of ulcer and Melvin patch 17  --continue IV PPI. H. Pylori Ab panel with negative IgG and +IgA; I researched this antibody pattern on  and given the clinical scenario I suggest patient start on treatment for H. Pylori once she begins taking PO (NG can not be removed until Tuesday to allow for adequate time for the stomach to heal)  --continue zosyn    --still with significant abd pain postop     Hx of HTN  --BP currently normal without medication. Hypokalemia: persists  Hypophosphatemia: improved  -replete with IV potassium today  -monitor electrolytes closely since NPO    Body mass index is 29.88 kg/(m^2).        Subjective:     Chief Complaint / Reason for Physician Visit: follow-up alcohol withdrawal and ARF  Pain a little better per husbands interactions with patient  Patient still complains of pain  NG in place, patient passing flatus    Review of Systems:  Symptom Y/N Comments  Symptom Y/N Comments   Fever/Chills    Chest Pain n    Poor Appetite    Edema n    Cough    Abdominal Pain     Sputum    Joint Pain     SOB/ABEBE y Due to abd pain  Pruritis/Rash     Nausea/vomit n   Tolerating PT/OT     Diarrhea    Tolerating Diet     Constipation    Tremors       Could NOT obtain due to:      Objective:     VITALS:   Last 24hrs VS reviewed since prior progress note. Most recent are:  Patient Vitals for the past 24 hrs:   Temp Pulse Resp BP SpO2   07/30/17 1053 97.2 °F (36.2 °C) 95 20 136/73 94 %   07/30/17 0749 97.9 °F (36.6 °C) 84 18 142/73 96 %   07/30/17 0338 98.3 °F (36.8 °C) 92 18 143/80 94 %   07/29/17 2248 99.1 °F (37.3 °C) 93 - 134/70 94 %   07/29/17 1859 99 °F (37.2 °C) (!) 105 20 145/68 95 %   07/29/17 1517 98.3 °F (36.8 °C) (!) 104 18 127/72 96 %       Intake/Output Summary (Last 24 hours) at 07/30/17 1457  Last data filed at 07/30/17 1129   Gross per 24 hour   Intake          3852.08 ml   Output              650 ml   Net          3202.08 ml        PHYSICAL EXAM:  General: WD, Obese. Alert, cooperative, no acute distress    EENT:  EOMI. Anicteric sclerae. MM dry; NG in place  Resp:  CTA bilaterally, no wheezing or rales. No accessory muscle use  CV:  Regular  rhythm,  No edema  GI:  Did not palpate abdomen but bowel sounds improving  Neurologic:  Alert and oriented X 3, normal speech,   Psych:   Fair insight. Not anxious nor agitated  Skin:  No rashes. No jaundice    Reviewed most current lab test results and cultures  YES  Reviewed most current radiology test results   YES  Review and summation of old records today    NO  Reviewed patient's current orders and MAR    YES  PMH/ reviewed - no change compared to H&P  ________________________________________________________________________  Care Plan discussed with:    Comments   Patient x    Family  x    RN     Care Manager     Consultant  x Dr. Karolina Saini team rounds were held today with , nursing, pharmacist and clinical coordinator. Patient's plan of care was discussed; medications were reviewed and discharge planning was addressed.      ________________________________________________________________________  Total NON critical care TIME:  20   Minutes    Total CRITICAL CARE TIME Spent:   Minutes non procedure based      Comments   >50% of visit spent in counseling and coordination of care     ________________________________________________________________________  Latonia Day MD     Procedures: see electronic medical records for all procedures/Xrays and details which were not copied into this note but were reviewed prior to creation of Plan. LABS:  I reviewed today's most current labs and imaging studies.   Pertinent labs include:  Recent Labs      07/30/17 0305 07/29/17 0337   WBC  12.6*  10.7   HGB  11.6  11.9   HCT  35.2  35.6   PLT  174  184     Recent Labs      07/30/17 0305 07/29/17 0337   NA  141  138   K  3.3*  3.2*   CL  107  107   CO2  22  21   GLU  78  92   BUN  18  21*   CREA  0.82  0.93   CA  8.3*  8.3*   MG  1.6  1.6   PHOS  3.1  2.1*   ALB  2.3*  2.4*   TBILI  1.1*  0.8   SGOT  24  17   ALT  22  19       Signed: Latonia Day MD

## 2017-07-30 NOTE — PROGRESS NOTES
End of Shift Nursing Note    Bedside shift change report given to CAROLYNN AVENDANO (oncoming nurse) by Kevin Yoder RN (offgoing nurse). Report included the following information SBAR, Kardex, OR Summary, Intake/Output, MAR and Recent Results. [facility] Phone:   [phone]    Significant changes during shift:    Transferred to Gen Surg from [address] Awake, alert and oriented until after Ativan then pt drowsy but easily arousable. Oxygen decreased to 2L/min N/C with sats 95%. Non-emergent issues for physician to address:   none     Number times ambulated in hallway past shift: 0      Number of times OOB to chair past shift: 1    POD #: 4     Vital Signs:    Temp: 97.9 °F (36.6 °C)     Pulse (Heart Rate): 91     BP: 127/71     Resp Rate: 16     O2 Sat (%): 95 %    Lines & Drains:     Urinary Catheter? No       NG tube [x] in [] removed [] not applicable   Drains [x] in [] removed [] not applicable     Skin Integrity:      Wounds: yes   Dressings Present: yes    Wound Concerns: yes      GI:    Current diet:  DIET NPO    Nausea: NO  Vomiting: NO  Bowel Sounds: YES  Flatus: YES  Last Bowel Movement: several days ago   Appearance:     Respiratory:  Supplemental O2: Yes      Device: N/C   via 2 Liters/min     Incentive Spirometer: YES  Volume:   Coughing and Deep Breathing: YES  Oral Care: YES  Understanding (patient/family education): YES   Getting out of bed: YES  Head of bed elevation: YES    Patient Safety:    Falls Score: 3  Mobility Score: 2  Bed Alarm On? Yes  Sitter? No      Opportunity for questions and clarification was given to oncoming nurse. Patient bed is in low position, side rails are up x 2, door & observation blinds open as needed, call bell within reach and patient not in distress.     Vester Essex, RN

## 2017-07-30 NOTE — PROGRESS NOTES
Admit Date: 2017    POD 4 Day Post-Op    Procedure:  Procedure(s):  LAPAROTOMY EXPLORATORY, REPAIR OF PERFORATED GASTRIC ULCER AND GRAM PATCH    Subjective:     Patient has complaints of pain. Not ambulating. Objective:     Blood pressure 136/73, pulse 95, temperature 97.2 °F (36.2 °C), resp. rate 20, height 5' 3\" (1.6 m), weight 168 lb 10.4 oz (76.5 kg), SpO2 94 %. Temp (24hrs), Av.3 °F (36.8 °C), Min:97.2 °F (36.2 °C), Max:99.1 °F (37.3 °C)      Physical Exam:  GENERAL: alert, cooperative, no distress, appears stated age, LUNG: clear to auscultation bilaterally, HEART: regular rate and rhythm, ABDOMEN: soft, non-tender. Wound c/d/i, EXTREMITIES:  extremities normal, atraumatic, no cyanosis or edema    Labs:   Recent Results (from the past 24 hour(s))   CBC WITH AUTOMATED DIFF    Collection Time: 17  3:05 AM   Result Value Ref Range    WBC 12.6 (H) 3.6 - 11.0 K/uL    RBC 3.22 (L) 3.80 - 5.20 M/uL    HGB 11.6 11.5 - 16.0 g/dL    HCT 35.2 35.0 - 47.0 %    .3 (H) 80.0 - 99.0 FL    MCH 36.0 (H) 26.0 - 34.0 PG    MCHC 33.0 30.0 - 36.5 g/dL    RDW 12.8 11.5 - 14.5 %    PLATELET 066 236 - 405 K/uL    NEUTROPHILS 76 (H) 32 - 75 %    LYMPHOCYTES 17 12 - 49 %    MONOCYTES 6 5 - 13 %    EOSINOPHILS 1 0 - 7 %    BASOPHILS 0 0 - 1 %    ABS. NEUTROPHILS 9.6 (H) 1.8 - 8.0 K/UL    ABS. LYMPHOCYTES 2.1 0.8 - 3.5 K/UL    ABS. MONOCYTES 0.8 0.0 - 1.0 K/UL    ABS. EOSINOPHILS 0.1 0.0 - 0.4 K/UL    ABS.  BASOPHILS 0.0 0.0 - 0.1 K/UL    RBC COMMENTS MACROCYTOSIS  1+        WBC COMMENTS REACTIVE LYMPHS      DF SMEAR SCANNED     METABOLIC PANEL, COMPREHENSIVE    Collection Time: 17  3:05 AM   Result Value Ref Range    Sodium 141 136 - 145 mmol/L    Potassium 3.3 (L) 3.5 - 5.1 mmol/L    Chloride 107 97 - 108 mmol/L    CO2 22 21 - 32 mmol/L    Anion gap 12 5 - 15 mmol/L    Glucose 78 65 - 100 mg/dL    BUN 18 6 - 20 MG/DL    Creatinine 0.82 0.55 - 1.02 MG/DL    BUN/Creatinine ratio 22 (H) 12 - 20      GFR est AA >60 >60 ml/min/1.73m2    GFR est non-AA >60 >60 ml/min/1.73m2    Calcium 8.3 (L) 8.5 - 10.1 MG/DL    Bilirubin, total 1.1 (H) 0.2 - 1.0 MG/DL    ALT (SGPT) 22 12 - 78 U/L    AST (SGOT) 24 15 - 37 U/L    Alk. phosphatase 70 45 - 117 U/L    Protein, total 6.3 (L) 6.4 - 8.2 g/dL    Albumin 2.3 (L) 3.5 - 5.0 g/dL    Globulin 4.0 2.0 - 4.0 g/dL    A-G Ratio 0.6 (L) 1.1 - 2.2     MAGNESIUM    Collection Time: 07/30/17  3:05 AM   Result Value Ref Range    Magnesium 1.6 1.6 - 2.4 mg/dL   PHOSPHORUS    Collection Time: 07/30/17  3:05 AM   Result Value Ref Range    Phosphorus 3.1 2.6 - 4.7 MG/DL       Data Review images and reports reviewed    Assessment:     Principal Problem:    Perforated viscus (7/26/2017)    Active Problems:    Chronic alcoholism ([facility] [address] (7/26/2017)      MELBA (acute kidney injury) ([facility] [address] (7/26/2017)        Plan/Recommendations/Medical Decision Making:     Continue present treatment   Exceptionally high risk for EtOH w/d  Appreciate hospitalist assistance  Pt had initial consultation scheduled with Dr. Khloe Trevizo at [facility] for next Friday. Continue NGT and strict NPO until Tuesday. H pylori IGA+, will treat once taking po      Tommy Terrazas MD, [facility]

## 2017-07-30 NOTE — PROGRESS NOTES
TRANSFER - OUT REPORT:    Verbal report given to Brina(name) on Wicho Sullivan  being transferred to Gen surg(unit) for routine progression of care       Report consisted of patients Situation, Background, Assessment and   Recommendations(SBAR). Information from the following report(s) SBAR, Kardex and MAR was reviewed with the receiving nurse. Lines:   Peripheral IV 07/26/17 Right Antecubital (Active)   Site Assessment Clean, dry, & intact 7/30/2017  7:35 AM   Phlebitis Assessment 0 7/30/2017  7:35 AM   Infiltration Assessment 0 7/30/2017  7:35 AM   Dressing Status Clean, dry, & intact 7/30/2017  7:35 AM   Dressing Type Transparent;Tape 7/30/2017  7:35 AM   Hub Color/Line Status Pink; Infusing 7/30/2017  7:35 AM   Action Taken Blood drawn 7/26/2017  5:46 PM        Opportunity for questions and clarification was provided.       Patient transported with:   O2 @ 3 liters

## 2017-07-30 NOTE — PROGRESS NOTES
[address] SHIFT NURSING NOTE    Bedside shift change report given to Shawn Castellanos (oncoming nurse) by Jack Noel (offgoing nurse). Report included the following information SBAR, Kardex and MAR. SHIFT SUMMARY:         Admission Date 7/26/2017   Admission Diagnosis perperated viscous  Perforated viscus   Consults IP CONSULT TO HOSPITALIST        Consults   [x] PT   [] OT   [] Speech   [] Palliative      [] Hospice    [] Case Management   [] None   Cardiac Monitoring   [x] Yes   [] No     Antibiotics   [x] Yes   [] No   GI Prophylaxis  (Ex: Protonix, Pepcid, etc,.)   [x] Yes   [] No          DVT Prophylaxis   SCDs:  Sequential Compression Device: Bilateral          Jose stockings:         [] Medication (Ex: Lovenox, Eliquis, Brilinta, Coumadin,  Heparin, etc..)   [] Contraindicated   [x] No VTE needed       Urinary Catheter [REMOVED] Urinary Catheter 07/26/17 [place]; 2- way-Criteria for Appropriate Use: Surgical procedure     [REMOVED] Urinary Catheter 07/26/17 [place]; 2- way-Urine Output (mL): 250 ml     LDAs               Peripheral IV 07/26/17 Right Antecubital (Active)   Site Assessment Clean, dry, & intact 7/30/2017  7:35 AM   Phlebitis Assessment 0 7/30/2017  7:35 AM   Infiltration Assessment 0 7/30/2017  7:35 AM   Dressing Status Clean, dry, & intact 7/30/2017  7:35 AM   Dressing Type Transparent;Tape 7/30/2017  7:35 AM   Hub Color/Line Status Pink; Infusing 7/30/2017  7:35 AM   Action Taken Blood drawn 7/26/2017  5:46 PM          Rudolph-Snider Drain 07/26/17 Abdomen (Active)   Site Assessment Clean, dry, & intact 7/30/2017  3:23 AM   Dressing Status Clean, dry, & intact 7/30/2017  3:23 AM   Status Charged;Draining 7/30/2017  3:23 AM   Drainage Color Serosanguinous 7/30/2017  3:23 AM   Output (ml) 25 ml 7/30/2017  3:23 AM       Nasogastric Tube 07/26/17 (Active)   Site Assessment Clean, dry, & intact 7/30/2017  7:35 AM   Dressing Status Clean, dry, & intact 7/30/2017  7:35 AM   G Port Status Continuous Suction 7/30/2017  7:35 AM   External Insertion Tommy (cms) 73 cms 7/30/2017  7:35 AM   Action Taken Placement verified (comment) 7/30/2017  7:35 AM   Drainage Description Khurram Khoury 7/30/2017  7:35 AM   Drainage Chamber Level (ml) 100 ml 7/30/2017  3:22 AM   Output (ml) 100 ml 7/30/2017  3:22 AM                I/Os   Intake/Output Summary (Last 24 hours) at 07/30/17 0802  Last data filed at 07/30/17 0323   Gross per 24 hour   Intake          3852.08 ml   Output              575 ml   Net          3277.08 ml         Activity Level Activity Level: Up with Assistance     Activity Assistance: Partial (two people)   Diet Active Orders   Diet    DIET NPO      Purposeful Rounding every 1-2 hour? [x] Yes    Moni Score  Total Score: 3   Bed Alarm (If score 3 or >)   [x] Yes    [] Refused (See signed refusal form in chart)   Darrel Score  Darrel Score: 17       Darrel Score (if score 14 or less)   [] PMT consult   [] Nutrition consult   [] Wound Care consult      []  Specialty bed         Influenza Vaccine Received Flu Vaccine for Current Season (usually Sept-March): Not Flu Season               Needs prior to discharge:   Home O2 required:    [] Yes   [x] No     If yes, how much O2 required? Other:    Last Bowel Movement Date: 07/26/17   Readmission Risk Assessment Tool Score Low Risk            10       Total Score        3 Has Seen PCP in Last 6 Months (Yes=3, No=0)    2 . Living with Significant Other. Assisted Living. LTAC. SNF. or   Rehab    5 Pt.  Coverage (Medicare=5 , Medicaid, or Self-Pay=4)        Criteria that do not apply:    Patient Length of Stay (>5 days = 3)    IP Visits Last 12 Months (1-3=4, 4=9, >4=11)    Charlson Comorbidity Score (Age + Comorbid Conditions)       Expected Length of Stay 10d 12h   Actual Length of Stay 4

## 2017-07-30 NOTE — PROGRESS NOTES
Primary Nurse Calvin Cloud RN and Melissa Liu RN performed a dual skin assessment on this patient Impairment noted- see wound doc flow sheet  Darrel score is 18    Mid abd incision site in tact with honeycombed dressing. Right addomen BASSAM site intact with dressing. Bilateral medial great toe on both feet with blanchable redness noted, no skin breakdown. Right neck/ chest area with redness and swelling noted,  Patient states it has been like that, \"I have sensitive skin\". Area is blanchable. Red non-raised rash noted mid upper back, skin intact. Multiple areas of small spider veins/varicose veins all over body.

## 2017-07-31 LAB
ALBUMIN SERPL BCP-MCNC: 2.1 G/DL (ref 3.5–5)
ALBUMIN/GLOB SERPL: 0.5 {RATIO} (ref 1.1–2.2)
ALP SERPL-CCNC: 88 U/L (ref 45–117)
ALT SERPL-CCNC: 38 U/L (ref 12–78)
ANION GAP BLD CALC-SCNC: 12 MMOL/L (ref 5–15)
AST SERPL W P-5'-P-CCNC: 33 U/L (ref 15–37)
BACTERIA SPEC CULT: NORMAL
BASOPHILS # BLD AUTO: 0 K/UL (ref 0–0.1)
BASOPHILS # BLD: 0 % (ref 0–1)
BILIRUB SERPL-MCNC: 1.1 MG/DL (ref 0.2–1)
BUN SERPL-MCNC: 15 MG/DL (ref 6–20)
BUN/CREAT SERPL: 20 (ref 12–20)
CALCIUM SERPL-MCNC: 8.6 MG/DL (ref 8.5–10.1)
CHLORIDE SERPL-SCNC: 108 MMOL/L (ref 97–108)
CO2 SERPL-SCNC: 21 MMOL/L (ref 21–32)
CREAT SERPL-MCNC: 0.76 MG/DL (ref 0.55–1.02)
DIFFERENTIAL METHOD BLD: ABNORMAL
EOSINOPHIL # BLD: 0.1 K/UL (ref 0–0.4)
EOSINOPHIL NFR BLD: 1 % (ref 0–7)
ERYTHROCYTE [DISTWIDTH] IN BLOOD BY AUTOMATED COUNT: 12.8 % (ref 11.5–14.5)
GLOBULIN SER CALC-MCNC: 4.4 G/DL (ref 2–4)
GLUCOSE SERPL-MCNC: 75 MG/DL (ref 65–100)
HCT VFR BLD AUTO: 35.5 % (ref 35–47)
HGB BLD-MCNC: 11.7 G/DL (ref 11.5–16)
LYMPHOCYTES # BLD AUTO: 19 % (ref 12–49)
LYMPHOCYTES # BLD: 2.3 K/UL (ref 0.8–3.5)
MAGNESIUM SERPL-MCNC: 1.7 MG/DL (ref 1.6–2.4)
MCH RBC QN AUTO: 36 PG (ref 26–34)
MCHC RBC AUTO-ENTMCNC: 33 G/DL (ref 30–36.5)
MCV RBC AUTO: 109.2 FL (ref 80–99)
MONOCYTES # BLD: 0.6 K/UL (ref 0–1)
MONOCYTES NFR BLD AUTO: 5 % (ref 5–13)
NEUTS SEG # BLD: 9 K/UL (ref 1.8–8)
NEUTS SEG NFR BLD AUTO: 75 % (ref 32–75)
PHOSPHATE SERPL-MCNC: 3.2 MG/DL (ref 2.6–4.7)
PLATELET # BLD AUTO: 188 K/UL (ref 150–400)
POTASSIUM SERPL-SCNC: 3.5 MMOL/L (ref 3.5–5.1)
PROT SERPL-MCNC: 6.5 G/DL (ref 6.4–8.2)
RBC # BLD AUTO: 3.25 M/UL (ref 3.8–5.2)
RBC MORPH BLD: ABNORMAL
SERVICE CMNT-IMP: NORMAL
SODIUM SERPL-SCNC: 141 MMOL/L (ref 136–145)
WBC # BLD AUTO: 12 K/UL (ref 3.6–11)

## 2017-07-31 PROCEDURE — 85025 COMPLETE CBC W/AUTO DIFF WBC: CPT | Performed by: INTERNAL MEDICINE

## 2017-07-31 PROCEDURE — 36415 COLL VENOUS BLD VENIPUNCTURE: CPT | Performed by: INTERNAL MEDICINE

## 2017-07-31 PROCEDURE — 65270000029 HC RM PRIVATE

## 2017-07-31 PROCEDURE — 83735 ASSAY OF MAGNESIUM: CPT | Performed by: INTERNAL MEDICINE

## 2017-07-31 PROCEDURE — 74011000258 HC RX REV CODE- 258: Performed by: SURGERY

## 2017-07-31 PROCEDURE — 74011250636 HC RX REV CODE- 250/636: Performed by: SURGERY

## 2017-07-31 PROCEDURE — 84100 ASSAY OF PHOSPHORUS: CPT | Performed by: INTERNAL MEDICINE

## 2017-07-31 PROCEDURE — C9113 INJ PANTOPRAZOLE SODIUM, VIA: HCPCS | Performed by: SURGERY

## 2017-07-31 PROCEDURE — 74011000250 HC RX REV CODE- 250: Performed by: HOSPITALIST

## 2017-07-31 PROCEDURE — 74011250636 HC RX REV CODE- 250/636: Performed by: HOSPITALIST

## 2017-07-31 PROCEDURE — 80053 COMPREHEN METABOLIC PANEL: CPT | Performed by: INTERNAL MEDICINE

## 2017-07-31 PROCEDURE — 74011000250 HC RX REV CODE- 250: Performed by: SURGERY

## 2017-07-31 RX ADMIN — SODIUM CHLORIDE 125 ML/HR: 900 INJECTION, SOLUTION INTRAVENOUS at 00:13

## 2017-07-31 RX ADMIN — HYDROMORPHONE HYDROCHLORIDE 0.5 MG: 1 INJECTION, SOLUTION INTRAMUSCULAR; INTRAVENOUS; SUBCUTANEOUS at 22:10

## 2017-07-31 RX ADMIN — PIPERACILLIN SODIUM,TAZOBACTAM SODIUM 3.38 G: 3; .375 INJECTION, POWDER, FOR SOLUTION INTRAVENOUS at 06:05

## 2017-07-31 RX ADMIN — LORAZEPAM 1 MG: 2 INJECTION INTRAMUSCULAR; INTRAVENOUS at 06:05

## 2017-07-31 RX ADMIN — Medication 10 ML: at 21:01

## 2017-07-31 RX ADMIN — LORAZEPAM 1 MG: 2 INJECTION INTRAMUSCULAR; INTRAVENOUS at 21:01

## 2017-07-31 RX ADMIN — Medication 10 ML: at 22:10

## 2017-07-31 RX ADMIN — PIPERACILLIN SODIUM,TAZOBACTAM SODIUM 3.38 G: 3; .375 INJECTION, POWDER, FOR SOLUTION INTRAVENOUS at 21:00

## 2017-07-31 RX ADMIN — LORAZEPAM 1 MG: 2 INJECTION INTRAMUSCULAR; INTRAVENOUS at 15:03

## 2017-07-31 RX ADMIN — PIPERACILLIN SODIUM,TAZOBACTAM SODIUM 3.38 G: 3; .375 INJECTION, POWDER, FOR SOLUTION INTRAVENOUS at 12:19

## 2017-07-31 RX ADMIN — Medication 10 ML: at 12:24

## 2017-07-31 RX ADMIN — Medication 10 ML: at 09:55

## 2017-07-31 RX ADMIN — FOLIC ACID: 5 INJECTION, SOLUTION INTRAMUSCULAR; INTRAVENOUS; SUBCUTANEOUS at 15:24

## 2017-07-31 RX ADMIN — SODIUM CHLORIDE 40 MG: 9 INJECTION INTRAMUSCULAR; INTRAVENOUS; SUBCUTANEOUS at 09:55

## 2017-07-31 RX ADMIN — SODIUM CHLORIDE 40 MG: 9 INJECTION INTRAMUSCULAR; INTRAVENOUS; SUBCUTANEOUS at 20:58

## 2017-07-31 RX ADMIN — Medication 10 ML: at 18:45

## 2017-07-31 RX ADMIN — HYDROMORPHONE HYDROCHLORIDE 0.5 MG: 1 INJECTION, SOLUTION INTRAMUSCULAR; INTRAVENOUS; SUBCUTANEOUS at 18:44

## 2017-07-31 RX ADMIN — HYDROMORPHONE HYDROCHLORIDE 0.5 MG: 1 INJECTION, SOLUTION INTRAMUSCULAR; INTRAVENOUS; SUBCUTANEOUS at 12:24

## 2017-07-31 NOTE — PROGRESS NOTES
Hospitalist Progress Note    NAME: Nemo Florian   :  1950   MRN:  [id number]       Assessment / Plan:  Alcohol abuse, POA  --drinks 3L of gin a week. Last drink 17  --Continue ativan 1mg IV q8h (CIWA discontinued on )  --Continue daily goody bag while NPO  --drinking cessation encouraged in presence of  on  and again on  and still patient told the RN today that she wants a Angela Cruz once the NG comes out     Acute kidney injury, POA; resolved with IVF's  --Continue to monitor     Perforated prepyloric peptic ulcer, POA  Peritonitis causing Sepsis, POA due to perforated ulcer  --s/p exploratory laparotomy and repair of ulcer and Melvin patch 17  --continue IV PPI. H. Pylori Ab panel with negative IgG and +IgA; I researched this antibody pattern on  and given the clinical scenario I suggest patient start on treatment for H. Pylori once she begins taking PO (NG can not be removed until Tuesday to allow for adequate time for the stomach to heal)  --continue zosyn    --still with significant abd pain postop from BASSAM drain     Hx of HTN  --BP currently normal without medication. Hypokalemia: persists  Hypophosphatemia: improved  -electrolytes wnl today    Body mass index is 29.88 kg/(m^2).        Subjective:     Chief Complaint / Reason for Physician Visit: follow-up alcohol withdrawal and ARF  Pain a little better per husbands interactions with patient  Patient still complains of pain  NG in place, patient passing flatus    Review of Systems:  Symptom Y/N Comments  Symptom Y/N Comments   Fever/Chills    Chest Pain n    Poor Appetite    Edema n    Cough    Abdominal Pain y At BASSAM drain site   Sputum    Joint Pain     SOB/ABEBE y Due to abd pain; unchanged  Pruritis/Rash     Nausea/vomit n   Tolerating PT/OT     Diarrhea    Tolerating Diet     Constipation    Tremors       Could NOT obtain due to:      Objective:     VITALS:   Last 24hrs VS reviewed since prior progress note. Most recent are:  Patient Vitals for the past 24 hrs:   Temp Pulse Resp BP SpO2   07/31/17 1516 99 °F (37.2 °C) 82 18 141/72 94 %   07/31/17 1200 98 °F (36.7 °C) 84 20 138/74 97 %   07/31/17 0820 98 °F (36.7 °C) 81 20 143/70 93 %   07/31/17 0209 98.4 °F (36.9 °C) 88 18 148/82 94 %   07/30/17 1802 - 91 - - 95 %       Intake/Output Summary (Last 24 hours) at 07/31/17 1614  Last data filed at 07/31/17 1529   Gross per 24 hour   Intake          3291.66 ml   Output             2075 ml   Net          1216.66 ml        PHYSICAL EXAM:  General: WD, Obese. Alert, cooperative, no acute distress    EENT:  EOMI. Anicteric sclerae. MM dry; NG in place  Resp:  CTA bilaterally, no wheezing or rales. No accessory muscle use  CV:  Regular  rhythm,  No edema  GI:  Did not palpate abdomen but bowel sounds improving  Neurologic:  Alert and oriented X 3, normal speech,   Psych:   Fair insight. Not anxious nor agitated  Skin:  No rashes. No jaundice    Reviewed most current lab test results and cultures  YES  Reviewed most current radiology test results   YES  Review and summation of old records today    NO  Reviewed patient's current orders and MAR    YES  PMH/SH reviewed - no change compared to H&P  ________________________________________________________________________  Care Plan discussed with:    Comments   Patient x    Family      RN x    Care Manager     Consultant                        Multidiciplinary team rounds were held today with , nursing, pharmacist and clinical coordinator. Patient's plan of care was discussed; medications were reviewed and discharge planning was addressed.      ________________________________________________________________________  Total NON critical care TIME:  20   Minutes    Total CRITICAL CARE TIME Spent:   Minutes non procedure based      Comments   >50% of visit spent in counseling and coordination of care ________________________________________________________________________  Teri Shane MD     Procedures: see electronic medical records for all procedures/Xrays and details which were not copied into this note but were reviewed prior to creation of Plan. LABS:  I reviewed today's most current labs and imaging studies.   Pertinent labs include:  Recent Labs      07/31/17   0401  07/30/17   0305  07/29/17   0337   WBC  12.0*  12.6*  10.7   HGB  11.7  11.6  11.9   HCT  35.5  35.2  35.6   PLT  188  174  184     Recent Labs      07/31/17   0401  07/30/17   0305  07/29/17   0337   NA  141  141  138   K  3.5  3.3*  3.2*   CL  108  107  107   CO2  21  22  21   GLU  75  78  92   BUN  15  18  21*   CREA  0.76  0.82  0.93   CA  8.6  8.3*  8.3*   MG  1.7  1.6  1.6   PHOS  3.2  3.1  2.1*   ALB  2.1*  2.3*  2.4*   TBILI  1.1*  1.1*  0.8   SGOT  33  24  17   ALT  38  22  19       Signed: Teri Shane MD

## 2017-07-31 NOTE — PROGRESS NOTES
End of Shift Nursing Note    Bedside shift change report given to Genesis Gil (oncoming nurse) by Ministerio Tatum RN (offgoing nurse). Report included the following information SBAR, Kardex and Recent Results. [facility] Phone:   [phone]    Significant changes during shift:    0   Non-emergent issues for physician to address:   0     Number times ambulated in hallway past shift: 0      Number of times OOB to chair past shift: 0    POD #: 5     Vital Signs:    Temp: 98.4 °F (36.9 °C)     Pulse (Heart Rate): 88     BP: 148/82     Resp Rate: 18     O2 Sat (%): 94 %    Lines & Drains:     Urinary Catheter? No   Placement Date: 0   Medical Necessity: 0  Central Line? No   Placement Date: 0   Medical Necessity: 0  PICC Line? No   Placement Date: 0   Medical Necessity: 0    NG tube [] in [] removed [] not applicable   Drains [] in [] removed [] not applicable     Skin Integrity:      Wounds: no   Dressings Present: no    Wound Concerns: no      GI:    Current diet:  DIET NPO    Nausea: NO  Vomiting: NO  Bowel Sounds: YES  Flatus: YES  Last Bowel Movement: several days ago   Appearance: 0    Respiratory:  Supplemental O2: yes      Device: cannula   via 1 Liters/min     Incentive Spirometer: YES  Volume: 500  Coughing and Deep Breathing: YES  Oral Care: YES  Understanding (patient/family education): YES   Getting out of bed: YES  Head of bed elevation: YES    Patient Safety:    Falls Score: 3  Mobility Score: 2  Bed Alarm On? Yes  Sitter? No      Opportunity for questions and clarification was given to oncoming nurse. Patient bed is in low position, side rails are up x 2, door & observation blinds open as needed, call bell within reach and patient not in distress.     Barry Rodrigez RN

## 2017-07-31 NOTE — PROGRESS NOTES
Assisted up to [facility] then chair, requiring 2 person assist,used can to assist in stability. Did not want the brace placed on left foot at this time.

## 2017-07-31 NOTE — PROGRESS NOTES
The patient is a 78year old female who was admitted for bleeding peptic ulcer,sepsis, peritonitis, with history of alcohol and tobacco abuse. Care Management Interventions  PCP Verified by CM: Yes (one week ago)  Transition of Care Consult (CM Consult): Discharge Planning  Discharge Durable Medical Equipment: No (no DME at home)  Physical Therapy Consult: Yes (possible rehab or home health)  Occupational Therapy Consult: Yes  Current Support Network: Lives with Spouse, Own Home (was independent and driving prior to admission)   CM will follow for discharge planning. The patient had an appointment with Dr. Willian Hurley for next week and the  called to reschedule and will be over 8 weeks-asked CM to assist with follow up with any physician in the office.  Zaina Muniz RN #[id number]

## 2017-07-31 NOTE — PROGRESS NOTES
Interdisciplinary Rounds were completed on this patient. Rounds included nursing, clinical care leader, pharmacy, and case management. Patient was doing well without problems. Patient had the following concerns: none.      Goals for the day will include: work with PT??

## 2017-07-31 NOTE — PROGRESS NOTES
Admit Date: 2017    POD 5 Days Post-Op    Procedure:  Procedure(s):  LAPAROTOMY EXPLORATORY, REPAIR OF PERFORATED GASTRIC ULCER AND GRAM PATCH    Subjective:     Patient has no new complaints. Objective:     Blood pressure 143/70, pulse 81, temperature 98 °F (36.7 °C), resp. rate 20, height 5' 3\" (1.6 m), weight 168 lb 10.4 oz (76.5 kg), SpO2 93 %. Temp (24hrs), Av.9 °F (36.6 °C), Min:97.2 °F (36.2 °C), Max:98.4 °F (36.9 °C)      Physical Exam:  GENERAL: alert, cooperative, no distress, appears stated age, LUNG: nl effort, HEART: regular rate and rhythm, ABDOMEN: NG,BASSAM, EXTREMITIES:  extremities normal, atraumatic, no cyanosis or edema    Labs:   Recent Results (from the past 24 hour(s))   CBC WITH AUTOMATED DIFF    Collection Time: 17  4:01 AM   Result Value Ref Range    WBC 12.0 (H) 3.6 - 11.0 K/uL    RBC 3.25 (L) 3.80 - 5.20 M/uL    HGB 11.7 11.5 - 16.0 g/dL    HCT 35.5 35.0 - 47.0 %    .2 (H) 80.0 - 99.0 FL    MCH 36.0 (H) 26.0 - 34.0 PG    MCHC 33.0 30.0 - 36.5 g/dL    RDW 12.8 11.5 - 14.5 %    PLATELET 463 297 - 390 K/uL    NEUTROPHILS 75 32 - 75 %    LYMPHOCYTES 19 12 - 49 %    MONOCYTES 5 5 - 13 %    EOSINOPHILS 1 0 - 7 %    BASOPHILS 0 0 - 1 %    ABS. NEUTROPHILS 9.0 (H) 1.8 - 8.0 K/UL    ABS. LYMPHOCYTES 2.3 0.8 - 3.5 K/UL    ABS. MONOCYTES 0.6 0.0 - 1.0 K/UL    ABS. EOSINOPHILS 0.1 0.0 - 0.4 K/UL    ABS.  BASOPHILS 0.0 0.0 - 0.1 K/UL    RBC COMMENTS MACROCYTOSIS  1+        DF SMEAR SCANNED     METABOLIC PANEL, COMPREHENSIVE    Collection Time: 17  4:01 AM   Result Value Ref Range    Sodium 141 136 - 145 mmol/L    Potassium 3.5 3.5 - 5.1 mmol/L    Chloride 108 97 - 108 mmol/L    CO2 21 21 - 32 mmol/L    Anion gap 12 5 - 15 mmol/L    Glucose 75 65 - 100 mg/dL    BUN 15 6 - 20 MG/DL    Creatinine 0.76 0.55 - 1.02 MG/DL    BUN/Creatinine ratio 20 12 - 20      GFR est AA >60 >60 ml/min/1.73m2    GFR est non-AA >60 >60 ml/min/1.73m2    Calcium 8.6 8.5 - 10.1 MG/DL    Bilirubin, total 1.1 (H) 0.2 - 1.0 MG/DL    ALT (SGPT) 38 12 - 78 U/L    AST (SGOT) 33 15 - 37 U/L    Alk. phosphatase 88 45 - 117 U/L    Protein, total 6.5 6.4 - 8.2 g/dL    Albumin 2.1 (L) 3.5 - 5.0 g/dL    Globulin 4.4 (H) 2.0 - 4.0 g/dL    A-G Ratio 0.5 (L) 1.1 - 2.2     MAGNESIUM    Collection Time: 07/31/17  4:01 AM   Result Value Ref Range    Magnesium 1.7 1.6 - 2.4 mg/dL   PHOSPHORUS    Collection Time: 07/31/17  4:01 AM   Result Value Ref Range    Phosphorus 3.2 2.6 - 4.7 MG/DL       Data Review reviewed  Consultants documentation, I & O and lab    Assessment:     Principal Problem:    Perforated viscus (7/26/2017)    Active Problems:    Chronic alcoholism ([facility] (7/26/2017)      [facility] (7/26/2017)        Plan/Recommendations/Medical Decision Making:     Continue present treatment   Continue NGT and strict NPO until Tuesday. H pylori IGA+, will treat once taking po    Tammy Stare.  Jamir Cortez  [facility]

## 2017-07-31 NOTE — PROGRESS NOTES
NGT tape loosened and NGT came out to 60 cm. [facility] notified and it is to remain at [address] [place] [address] and be taped in place, do not advance back down.

## 2017-07-31 NOTE — PROGRESS NOTES
Nutrition Services      Nutrition Screen:  Wt Readings from Last 10 Encounters:   07/30/17 76.5 kg (168 lb 10.4 oz)   07/25/17 75.8 kg (167 lb 1.7 oz)   05/22/13 63.5 kg (140 lb)   04/07/13 67.1 kg (148 lb)     Body mass index is 29.88 kg/(m^2). Supplements:                        _____ ordered ______  declined. __ __  Pt is nutritionally stable at this time, will rescreen in 7 days. __ __    Pt is at nutritional risk and will be rescreened in  days. __x __  Pt is at moderate or high nutritional risk, will refer to RD for assessment.        Kane Del Toro  Dietetic Technician, Registered

## 2017-07-31 NOTE — PROGRESS NOTES
End of Shift Nursing Note    Bedside shift change report given to CAROLYNN Santacruz (oncoming nurse) by Lizzie Lyon RN (offgoing nurse). Report included the following information SBAR, Kardex, OR Summary, Intake/Output, MAR and Recent Results. Zone Phone:   [phone]    Significant changes during shift:    Lab work in am.  Oxygen decreased to 2L/min N/C with sats 95%. NGT at 60 cm after sliding out when tape loosened, Re-taped at 60 cm,we are not to advance tube. Non-emergent issues for physician to address: ?Rehab to get more steady on feet before home     Number times ambulated in hallway past shift: 0      Number of times OOB to chair past shift: 1    POD #: 5     Vital Signs:    Temp: 99 °F (37.2 °C)     Pulse (Heart Rate): 82     BP: 141/72     Resp Rate: 18     O2 Sat (%): 94 %    Lines & Drains:     Urinary Catheter? No       NG tube [x] in [] removed [] not applicable   Drains [x] in [] removed [] not applicable     Skin Integrity:      Wounds: yes   Dressings Present: yes    Wound Concerns: yes      GI:    Current diet:  DIET NPO    Nausea: NO  Vomiting: NO  Bowel Sounds: YES  Flatus: YES  Last Bowel Movement: several days ago   Appearance:     Respiratory:  Supplemental O2: Yes      Device: N/C   via 2 Liters/min     Incentive Spirometer: YES  Volume:   Coughing and Deep Breathing: YES  Oral Care: YES  Understanding (patient/family education): YES   Getting out of bed: YES  Head of bed elevation: YES    Patient Safety:    Falls Score: 3  Mobility Score: 2  Bed Alarm On? Yes  Sitter? No      Opportunity for questions and clarification was given to oncoming nurse. Patient bed is in low position, side rails are up x 2, door & observation blinds open as needed, call bell within reach and patient not in distress.     Tobi Sage RN

## 2017-08-01 LAB
ANION GAP BLD CALC-SCNC: 11 MMOL/L (ref 5–15)
BASOPHILS # BLD AUTO: 0.1 K/UL (ref 0–0.1)
BASOPHILS # BLD: 1 % (ref 0–1)
BUN SERPL-MCNC: 12 MG/DL (ref 6–20)
BUN/CREAT SERPL: 18 (ref 12–20)
CALCIUM SERPL-MCNC: 8.5 MG/DL (ref 8.5–10.1)
CHLORIDE SERPL-SCNC: 111 MMOL/L (ref 97–108)
CO2 SERPL-SCNC: 21 MMOL/L (ref 21–32)
CREAT SERPL-MCNC: 0.65 MG/DL (ref 0.55–1.02)
EOSINOPHIL # BLD: 0.3 K/UL (ref 0–0.4)
EOSINOPHIL NFR BLD: 2 % (ref 0–7)
ERYTHROCYTE [DISTWIDTH] IN BLOOD BY AUTOMATED COUNT: 12.8 % (ref 11.5–14.5)
GLUCOSE SERPL-MCNC: 76 MG/DL (ref 65–100)
HCT VFR BLD AUTO: 32.2 % (ref 35–47)
HGB BLD-MCNC: 10.6 G/DL (ref 11.5–16)
LYMPHOCYTES # BLD AUTO: 19 % (ref 12–49)
LYMPHOCYTES # BLD: 2.2 K/UL (ref 0.8–3.5)
MAGNESIUM SERPL-MCNC: 1.7 MG/DL (ref 1.6–2.4)
MCH RBC QN AUTO: 35.2 PG (ref 26–34)
MCHC RBC AUTO-ENTMCNC: 32.9 G/DL (ref 30–36.5)
MCV RBC AUTO: 107 FL (ref 80–99)
MONOCYTES # BLD: 0.5 K/UL (ref 0–1)
MONOCYTES NFR BLD AUTO: 4 % (ref 5–13)
NEUTS SEG # BLD: 8.6 K/UL (ref 1.8–8)
NEUTS SEG NFR BLD AUTO: 74 % (ref 32–75)
PLATELET # BLD AUTO: 195 K/UL (ref 150–400)
POTASSIUM SERPL-SCNC: 3.3 MMOL/L (ref 3.5–5.1)
RBC # BLD AUTO: 3.01 M/UL (ref 3.8–5.2)
SODIUM SERPL-SCNC: 143 MMOL/L (ref 136–145)
WBC # BLD AUTO: 11.6 K/UL (ref 3.6–11)

## 2017-08-01 PROCEDURE — 74011250636 HC RX REV CODE- 250/636: Performed by: HOSPITALIST

## 2017-08-01 PROCEDURE — 74011250636 HC RX REV CODE- 250/636: Performed by: SURGERY

## 2017-08-01 PROCEDURE — 74011000258 HC RX REV CODE- 258: Performed by: SURGERY

## 2017-08-01 PROCEDURE — 74011250637 HC RX REV CODE- 250/637: Performed by: INTERNAL MEDICINE

## 2017-08-01 PROCEDURE — 65270000029 HC RM PRIVATE

## 2017-08-01 PROCEDURE — 77010033678 HC OXYGEN DAILY

## 2017-08-01 PROCEDURE — 74011250637 HC RX REV CODE- 250/637: Performed by: FAMILY MEDICINE

## 2017-08-01 PROCEDURE — 74011000250 HC RX REV CODE- 250: Performed by: HOSPITALIST

## 2017-08-01 PROCEDURE — 80048 BASIC METABOLIC PNL TOTAL CA: CPT | Performed by: SURGERY

## 2017-08-01 PROCEDURE — C9113 INJ PANTOPRAZOLE SODIUM, VIA: HCPCS | Performed by: SURGERY

## 2017-08-01 PROCEDURE — 36415 COLL VENOUS BLD VENIPUNCTURE: CPT | Performed by: INTERNAL MEDICINE

## 2017-08-01 PROCEDURE — 83735 ASSAY OF MAGNESIUM: CPT | Performed by: SURGERY

## 2017-08-01 PROCEDURE — 85025 COMPLETE CBC W/AUTO DIFF WBC: CPT | Performed by: INTERNAL MEDICINE

## 2017-08-01 PROCEDURE — 97116 GAIT TRAINING THERAPY: CPT

## 2017-08-01 PROCEDURE — 74011000250 HC RX REV CODE- 250: Performed by: SURGERY

## 2017-08-01 RX ORDER — POTASSIUM CHLORIDE AND SODIUM CHLORIDE 900; 300 MG/100ML; MG/100ML
INJECTION, SOLUTION INTRAVENOUS CONTINUOUS
Status: DISCONTINUED | OUTPATIENT
Start: 2017-08-01 | End: 2017-08-01

## 2017-08-01 RX ORDER — CLARITHROMYCIN 500 MG/1
500 TABLET, FILM COATED ORAL EVERY 12 HOURS
Status: DISCONTINUED | OUTPATIENT
Start: 2017-08-01 | End: 2017-08-04 | Stop reason: HOSPADM

## 2017-08-01 RX ORDER — POTASSIUM CHLORIDE AND SODIUM CHLORIDE 900; 300 MG/100ML; MG/100ML
INJECTION, SOLUTION INTRAVENOUS CONTINUOUS
Status: DISCONTINUED | OUTPATIENT
Start: 2017-08-01 | End: 2017-08-03 | Stop reason: ALTCHOICE

## 2017-08-01 RX ORDER — LORAZEPAM 2 MG/ML
2 INJECTION INTRAMUSCULAR
Status: DISCONTINUED | OUTPATIENT
Start: 2017-08-01 | End: 2017-08-02

## 2017-08-01 RX ORDER — LORAZEPAM 2 MG/ML
1 INJECTION INTRAMUSCULAR
Status: DISCONTINUED | OUTPATIENT
Start: 2017-08-01 | End: 2017-08-03 | Stop reason: ALTCHOICE

## 2017-08-01 RX ORDER — POTASSIUM CHLORIDE 750 MG/1
40 TABLET, FILM COATED, EXTENDED RELEASE ORAL
Status: COMPLETED | OUTPATIENT
Start: 2017-08-01 | End: 2017-08-01

## 2017-08-01 RX ADMIN — PIPERACILLIN SODIUM,TAZOBACTAM SODIUM 3.38 G: 3; .375 INJECTION, POWDER, FOR SOLUTION INTRAVENOUS at 13:30

## 2017-08-01 RX ADMIN — LORAZEPAM 1 MG: 2 INJECTION INTRAMUSCULAR; INTRAVENOUS at 13:33

## 2017-08-01 RX ADMIN — PIPERACILLIN SODIUM,TAZOBACTAM SODIUM 3.38 G: 3; .375 INJECTION, POWDER, FOR SOLUTION INTRAVENOUS at 05:54

## 2017-08-01 RX ADMIN — SODIUM CHLORIDE 40 MG: 9 INJECTION INTRAMUSCULAR; INTRAVENOUS; SUBCUTANEOUS at 08:57

## 2017-08-01 RX ADMIN — POTASSIUM CHLORIDE 40 MEQ: 750 TABLET, FILM COATED, EXTENDED RELEASE ORAL at 17:32

## 2017-08-01 RX ADMIN — HYDROMORPHONE HYDROCHLORIDE 0.5 MG: 1 INJECTION, SOLUTION INTRAMUSCULAR; INTRAVENOUS; SUBCUTANEOUS at 01:15

## 2017-08-01 RX ADMIN — HYDROMORPHONE HYDROCHLORIDE 0.5 MG: 1 INJECTION, SOLUTION INTRAMUSCULAR; INTRAVENOUS; SUBCUTANEOUS at 14:46

## 2017-08-01 RX ADMIN — PIPERACILLIN SODIUM,TAZOBACTAM SODIUM 3.38 G: 3; .375 INJECTION, POWDER, FOR SOLUTION INTRAVENOUS at 20:49

## 2017-08-01 RX ADMIN — Medication 10 ML: at 13:31

## 2017-08-01 RX ADMIN — HYDROMORPHONE HYDROCHLORIDE 0.5 MG: 1 INJECTION, SOLUTION INTRAMUSCULAR; INTRAVENOUS; SUBCUTANEOUS at 09:09

## 2017-08-01 RX ADMIN — Medication 10 ML: at 21:58

## 2017-08-01 RX ADMIN — HYDROMORPHONE HYDROCHLORIDE 0.5 MG: 1 INJECTION, SOLUTION INTRAMUSCULAR; INTRAVENOUS; SUBCUTANEOUS at 05:54

## 2017-08-01 RX ADMIN — CLARITHROMYCIN 500 MG: 500 TABLET ORAL at 20:44

## 2017-08-01 RX ADMIN — HYDROMORPHONE HYDROCHLORIDE 0.5 MG: 1 INJECTION, SOLUTION INTRAMUSCULAR; INTRAVENOUS; SUBCUTANEOUS at 22:07

## 2017-08-01 RX ADMIN — SODIUM CHLORIDE 40 MG: 9 INJECTION INTRAMUSCULAR; INTRAVENOUS; SUBCUTANEOUS at 20:44

## 2017-08-01 RX ADMIN — CLARITHROMYCIN 500 MG: 500 TABLET ORAL at 11:48

## 2017-08-01 RX ADMIN — SODIUM CHLORIDE AND POTASSIUM CHLORIDE: 9; 2.98 INJECTION, SOLUTION INTRAVENOUS at 20:50

## 2017-08-01 RX ADMIN — LORAZEPAM 1 MG: 2 INJECTION INTRAMUSCULAR; INTRAVENOUS at 05:52

## 2017-08-01 RX ADMIN — FOLIC ACID: 5 INJECTION, SOLUTION INTRAMUSCULAR; INTRAVENOUS; SUBCUTANEOUS at 11:48

## 2017-08-01 RX ADMIN — HYDROMORPHONE HYDROCHLORIDE 0.5 MG: 1 INJECTION, SOLUTION INTRAMUSCULAR; INTRAVENOUS; SUBCUTANEOUS at 16:45

## 2017-08-01 RX ADMIN — Medication 10 ML: at 05:53

## 2017-08-01 RX ADMIN — SODIUM CHLORIDE 125 ML/HR: 900 INJECTION, SOLUTION INTRAVENOUS at 01:25

## 2017-08-01 NOTE — PROGRESS NOTES
End of Shift Nursing Note    Bedside shift change report given to Ernestina Cline RN (oncoming nurse) by Conchita Price RN (offgoing nurse). Report included the following information SBAR, Kardex, OR Summary, Intake/Output, MAR and Recent Results. Zone Phone:   [phone]    Significant changes during shift:   NGT at 60 cm after sliding out when tape loosened, Re-taped at 60 cm,we are not to advance tube. Calcium is at 6.4 this morning. Non-emergent issues for physician to address: ?Rehab to get more steady on feet before home     Number times ambulated in hallway past shift: 0      Number of times OOB to chair past shift: 0    POD #: 6     Vital Signs:    Temp: 99 °F (37.2 °C)     Pulse (Heart Rate): 81     BP: (!) 152/91     Resp Rate: 18     O2 Sat (%): 94 %    Lines & Drains:     Urinary Catheter? No       NG tube [x] in [] removed [] not applicable   Drains [x] in [] removed [] not applicable     Skin Integrity:      Wounds: yes   Dressings Present: yes    Wound Concerns: yes      GI:    Current diet:  DIET NPO    Nausea: NO  Vomiting: NO  Bowel Sounds: YES  Flatus: YES  Last Bowel Movement: several days ago   Appearance:     Respiratory:  Supplemental O2: Yes      Device: N/C   via 2 Liters/min     Incentive Spirometer: YES  Volume:   Coughing and Deep Breathing: YES  Oral Care: YES  Understanding (patient/family education): YES   Getting out of bed: YES  Head of bed elevation: YES    Patient Safety:    Falls Score: 3  Mobility Score: 2  Bed Alarm On? Yes  Sitter? No      Opportunity for questions and clarification was given to oncoming nurse. Patient bed is in low position, side rails are up x 2, door & observation blinds open as needed, call bell within reach and patient not in distress.     Keiko Barrow RN

## 2017-08-01 NOTE — PROGRESS NOTES
Hospitalist Progress Note    NAME: Peterson Mercedes   :  1950   MRN:  [id number]       Assessment / Plan:  Acute kidney injury  resolved with IVF    Hypokalemia  Replete and minitor    Alcohol abuse POA  Last drink 17  d/c schedule ativan and place CIWA  Continue daily goody bag while NPO  Drinking cessation encouraged in presence of  multiple times during admission     Perforated prepyloric peptic ulcer POA  Peritonitis causing Sepsis, POA due to perforated ulcer  S/p exploratory laparotomy and repair of ulcer and Melvin patch 17  Continue IV PPI  H. Pylori Ab panel with +IgA  On Zosyn, added clarithromycin  OP f/u with GI (CM made an appointment to see Dr Isabel Sims)  Under surgery care     Hx of HTN  BP currently normal without medication. Hypophosphatemia  Improved    Body mass index is 29.88 kg/(m^2). Subjective: Pt seen and examined at bedside. NG tube out today. NAD. Overnight events d/w RN     Chief Complaint / Reason for Physician Visit: f/u \"medical management\"    Review of Systems:  Symptom Y/N Comments  Symptom Y/N Comments   Fever/Chills n   Chest Pain n    Poor Appetite    Edema n    Cough n   Abdominal Pain y At surgery site   Sputum    Joint Pain     SOB/ABEBE n   Pruritis/Rash     Nausea/vomit n   Tolerating PT/OT     Diarrhea    Tolerating Diet     Constipation    Tremors       Could NOT obtain due to:      Objective:     VITALS:   Last 24hrs VS reviewed since prior progress note.  Most recent are:  Patient Vitals for the past 24 hrs:   Temp Pulse Resp BP SpO2   17 1542 97.8 °F (36.6 °C) 84 15 135/71 94 %   17 1125 98 °F (36.7 °C) 86 18 129/66 93 %   17 0811 97.9 °F (36.6 °C) 76 16 141/77 94 %   17 0400 99 °F (37.2 °C) 81 18 (!) 152/91 94 %   17 0203 - - - - 95 %   17 0000 99 °F (37.2 °C) 82 20 150/88 94 %   17 2106 98.5 °F (36.9 °C) 89 20 157/71 93 %       Intake/Output Summary (Last 24 hours) at 17 9650  Last data filed at 08/01/17 1525   Gross per 24 hour   Intake          3355.83 ml   Output              765 ml   Net          2590.83 ml        PHYSICAL EXAM:  General: WD, Obese. Alert, cooperative, no acute distress    EENT:  EOMI. Anicteric sclerae. MM dry; NG in place  Resp:  CTA bilaterally, no wheezing or rales. No accessory muscle use  CV:  Regular  rhythm,  No edema  GI:  Did not palpate abdomen but bowel sounds improving  Neurologic:  Alert and oriented X 3, normal speech,   Psych:   Fair insight. Not anxious nor agitated  Skin:  No rashes. No jaundice    Reviewed most current lab test results and cultures  YES  Reviewed most current radiology test results   YES  Review and summation of old records today    NO  Reviewed patient's current orders and MAR    YES  PMH/SH reviewed - no change compared to H&P  ________________________________________________________________________  Care Plan discussed with:    Comments   Patient x    Family  x At bedside   RN x    Care Manager     Consultant                        Multidiciplinary team rounds were held today with , nursing, pharmacist and clinical coordinator. Patient's plan of care was discussed; medications were reviewed and discharge planning was addressed. ________________________________________________________________________  Total NON critical care TIME: 25 Minutes    Total CRITICAL CARE TIME Spent:   Minutes non procedure based      Comments   >50% of visit spent in counseling and coordination of care     ________________________________________________________________________  Asad Kunz MD     Procedures: see electronic medical records for all procedures/Xrays and details which were not copied into this note but were reviewed prior to creation of Plan. LABS:  I reviewed today's most current labs and imaging studies.   Pertinent labs include:  Recent Labs      08/01/17   0449  07/31/17   0401  07/30/17   0305   WBC  11.6*  12.0*  12.6* HGB  10.6*  11.7  11.6   HCT  32.2*  35.5  35.2   PLT  195  188  174     Recent Labs      08/01/17   0449  07/31/17   0401  07/30/17   0305   NA  143  141  141   K  3.3*  3.5  3.3*   CL  111*  108  107   CO2  21  21  22   GLU  76  75  78   BUN  12  15  18   CREA  0.65  0.76  0.82   CA  8.5  8.6  8.3*   MG  1.7  1.7  1.6   PHOS   --   3.2  3.1   ALB   --   2.1*  2.3*   TBILI   --   1.1*  1.1*   SGOT   --   33  24   ALT   --   38  22       Signed: Charisse Acosta MD

## 2017-08-01 NOTE — PROGRESS NOTES
Interdisciplinary Rounds were completed on this patient. Rounds included nursing, clinical care leader, pharmacy, and case management. Patient was doing well without problems. Patient had the following concerns: none. Goals for the day will include: mobilize and PT/OT.

## 2017-08-01 NOTE — PROGRESS NOTES
End of Shift Nursing Note    Bedside shift change report given to Siena Ruano RN (oncoming nurse) by Debra Stewart. Bello Scales RN (offgoing nurse). Report included the following information SBAR. [facility] Phone:   [phone]    Significant changes during shift:    none   Non-emergent issues for physician to address:   none     Number times ambulated in hallway past shift: in room      Number of times OOB to chair past shift: 2    POD #: 6    Vital Signs:    Temp: 97.8 °F (36.6 °C)     Pulse (Heart Rate): 84     BP: 135/71     Resp Rate: 15     O2 Sat (%): 94 %    Lines & Drains:     Urinary Catheter? No     Central Line: No    PICC Line? No       NG tube [] in [] removed [x] not applicable   Drains [x] in [] removed [] not applicable     Skin Integrity:      Wounds: no   Dressings Present: yes    Wound Concerns: no      GI:    Current diet:  DIET CLEAR LIQUID    Nausea: NO  Vomiting: NO  Bowel Sounds: YES  Flatus: YES  Last Bowel Movement: today   Appearance: loose    Respiratory:  Supplemental O2: No      Device: RA       Incentive Spirometer: YES  Volume: fair  Coughing and Deep Breathing: YES  Oral Care: YES  Understanding (patient/family education): YES   Getting out of bed: YES  Head of bed elevation: YES    Patient Safety:    Falls Score: 4  Mobility Score: 2  Bed Alarm On? Yes  Sitter? No      Opportunity for questions and clarification was given to oncoming nurse. Patient bed is in supine position, side rails are up x 2, door & observation blinds open as needed, call bell within reach and patient not in distress.     Nae Cesar RN

## 2017-08-01 NOTE — PROGRESS NOTES
Nutrition Assessment:    RECOMMENDATIONS:   Continue diet advancement per surgeon    DIETITIANS INTERVENTIONS/PLAN:   Advance diet as medically able per surgeon  Monitor appetite    ASSESSMENT:   Pt admitted with perforated viscous. PMH: ETOH abuse. Chart reviewed. Pt is POD#6 ex lap + repair of gastric ulcer + gram patch. NGT removed and she has been started on clear liquids today. K+ 3.3, being repleted. She is on a goody bag given her ETOH hx.  Will monitor her appetite and need for PO supplements upon F/U.      SUBJECTIVE/OBJECTIVE:     Diet Order: Clear liquids  % Eaten:  Patient Vitals for the past 72 hrs:   % Diet Eaten   08/01/17 0811 0 %     Pertinent Medications:protonix, zosyn, KCl; [email or web address]). Chemistries:  Lab Results   Component Value Date/Time    Sodium 143 08/01/2017 04:49 AM    Potassium 3.3 08/01/2017 04:49 AM    Chloride 111 08/01/2017 04:49 AM    CO2 21 08/01/2017 04:49 AM    Anion gap 11 08/01/2017 04:49 AM    Glucose 76 08/01/2017 04:49 AM    BUN 12 08/01/2017 04:49 AM    Creatinine 0.65 08/01/2017 04:49 AM    BUN/Creatinine ratio 18 08/01/2017 04:49 AM    GFR est AA >60 08/01/2017 04:49 AM    GFR est non-AA >60 08/01/2017 04:49 AM    Calcium 8.5 08/01/2017 04:49 AM    AST (SGOT) 33 07/31/2017 04:01 AM    Alk. phosphatase 88 07/31/2017 04:01 AM    Protein, total 6.5 07/31/2017 04:01 AM    Albumin 2.1 07/31/2017 04:01 AM    Globulin 4.4 07/31/2017 04:01 AM    A-G Ratio 0.5 07/31/2017 04:01 AM    ALT (SGPT) 38 07/31/2017 04:01 AM      Anthropometrics: Height: 5' 3\" (160 cm) Weight: 76.5 kg (168 lb 10.4 oz)    IBW (%IBW):   ( ) UBW (%UBW):   (  %)    BMI: Body mass index is 29.88 kg/(m^2). This BMI is indicative of:  []Underweight   []Normal   [x]Overweight   [] Obesity   [] Extreme Obesity (BMI>40)  Estimated Nutrition Needs (Based on): 1650 Kcals/day (MSJ 1269 x 1.3) , 61 g (0.8gPro/kg) Protein  Carbohydrate:  At Least 130 g/day  Fluids: 1700 mL/day    Last BM: 7/26   []Active []Hyperactive  []Hypoactive       [] Absent   BS  Skin:    [] Intact   [x] Incision  [] Breakdown   [] DTI   [] Tears/Excoriation/Abrasion  []Edema [] Other: Wt Readings from Last 30 Encounters:   07/30/17 76.5 kg (168 lb 10.4 oz)   07/25/17 75.8 kg (167 lb 1.7 oz)   05/22/13 63.5 kg (140 lb)   04/07/13 67.1 kg (148 lb)      NUTRITION DIAGNOSES:   Problem:  Altered GI function      Etiology: related to perforated viscous      Signs/Symptoms: as evidenced by need for surgery, NPO/clears x 6 days. NUTRITION INTERVENTIONS:  Meals/Snacks: Other (advance diet as medically able per surgon )                  GOAL:   Pt will advance to solid diet and consume >50% of meals in 3-5 days. Cultural, Muslim, or Ethnic Dietary Needs: None     LEARNING NEEDS (Diet, Food/Nutrient-Drug Interaction):    [x] None Identified   [] Identified and Education Provided/Documented   [] Identified and Pt declined/was not appropriate      [x] Interdisciplinary Care Plan Reviewed/Documented    [x] Participated in Discharge Planning:  To be determined    [] Interdisciplinary Rounds     NUTRITION RISK:    [] High              [x] Moderate           []  Low  []  Minimal/Uncompromised    PT SEEN FOR:    []  MD Consult: []Calorie Count      []Diabetic Diet Education        []Diet Education     []Electrolyte Management     []General Nutrition Management and Supplements     []Management of Tube Feeding     []TPN Recommendations    []  RN Referral:  []MST score >=2     []Enteral/Parenteral Nutrition PTA     []Pregnant: Gestational DM or Multigestation   []  Low BMI  [x]  DTR Referral       Annika Walsh, [address]    Pager [phone]  Weekend Pager [phone]

## 2017-08-01 NOTE — PROGRESS NOTES
Problem: Mobility Impaired (Adult and Pediatric)  Goal: *Acute Goals and Plan of Care (Insert Text)  Physical Therapy Goals  Initiated 7/28/2017  1. Patient will move from supine to sit and sit to supine , scoot up and down and roll side to side in bed with supervision/set-up within 7 day(s). 2. Patient will transfer from bed to chair and chair to bed with supervision/set-up using the least restrictive device within 7 day(s). 3. Patient will perform sit to stand with supervision/set-up within 7 day(s). 4. Patient will ambulate with supervision/set-up for 150 feet with the least restrictive device within 7 day(s). 5. Patient will ascend/descend 4 stairs with dual handrail(s) with minimal assistance/contact guard assist within 7 day(s). PHYSICAL THERAPY TREATMENT  Patient: Wesley Brothers (74 y.o. female)  Date: 8/1/2017  Diagnosis: perperated viscous     Procedure(s) (LRB):  LAPAROTOMY EXPLORATORY, REPAIR OF PERFORATED GASTRIC ULCER AND GRAM PATCH (Bilateral) 6 Days Post-Op      Precautions:  falls  Chart, physical therapy assessment, plan of care and goals were reviewed. ASSESSMENT: pt remains very weak and moves very slow, did not c/o any pain, no LOB or SOB, needed 2 standing rest breaks, could use short term SNF for increased strength and endurance, vc's for safety and proper RW use. Progression toward goals:  [ ]      Improving appropriately and progressing toward goals  [X]      Improving slowly and progressing toward goals  [ ]      Not making progress toward goals and plan of care will be adjusted       PLAN:  Patient continues to benefit from skilled intervention to address the above impairments. Continue treatment per established plan of care.    Discharge Recommendations:  Edward Shetty  Further Equipment Recommendations for Discharge:  rolling walker       OBJECTIVE DATA SUMMARY:      Critical Behavior:  Neurologic State: Alert, Appropriate for age  Orientation Level: Disoriented to time, Oriented to person, Oriented to place  Cognition: Follows commands  Safety/Judgement: Awareness of environment      Functional Mobility Training:  Bed Mobility: pt sitting in chair on arrival     Transfers:  Sit to Stand: Contact guard assistance; Additional time  Stand to Sit: Contact guard assistance  Interventions: Tactile cues; Verbal cues  Level of Assistance: Contact guard assistance      Balance:  Sitting: Intact; Without support  Standing: Intact; With support  Standing - Static: Good;Constant support  Standing - Dynamic : Good      Ambulation/Gait Training:  Distance (ft): 60 Feet (ft)  Assistive Device: Gait belt;Walker, rolling  Ambulation - Level of Assistance: Contact guard assistance;Minimal assistance  Gait Abnormalities: Decreased step clearance;Shuffling gait  Right Side Weight Bearing: Full  Left Side Weight Bearing: Full  Base of Support: Widened  Stance:  (equal)  Speed/Rita: Slow  Step Length: Left shortened;Right shortened     Pain:  Pain Scale 1: Numeric (0 - 10)  Pain Intensity 1: 0  Pain Location 1: Back; Abdomen;Neck  Pain Orientation 1: Right;Posterior  Pain Description 1: Aching  Pain Intervention(s) 1: Medication (see MAR)      Activity Tolerance: poor     After treatment:   [X] Patient left in no apparent distress sitting up in chair  [ ] Patient left in no apparent distress in bed  [X] Call bell left within reach  [X] Nursing notified  [X] Caregiver present  [ ] Bed alarm activated      COMMUNICATION/COLLABORATION:   The patients plan of care was discussed with: Registered Nurse     Sukumar Sarah PTA   Time Calculation: 20 mins

## 2017-08-01 NOTE — PROGRESS NOTES
End of Shift Nursing Note    Bedside shift change report given to Tom Dominguez RN (oncoming nurse) by Alberto Quan RN (offgoing nurse). Report included the following information SBAR, Kardex, OR Summary, Intake/Output, MAR and Recent Results. Zone Phone:   [phone]    Significant changes during shift:   NGT at 60 cm after sliding out when tape loosened, Re-taped at 60 cm,we are not to advance tube. Calcium is at 6.4 this morning. Non-emergent issues for physician to address: ?Rehab to get more steady on feet before home     Number times ambulated in hallway past shift: 0      Number of times OOB to chair past shift: 0    POD #: 6     Vital Signs:    Temp: 99 °F (37.2 °C)     Pulse (Heart Rate): 81     BP: (!) 152/91     Resp Rate: 18     O2 Sat (%): 94 %    Lines & Drains:     Urinary Catheter? No       NG tube [x] in [] removed [] not applicable   Drains [x] in [] removed [] not applicable     Skin Integrity:      Wounds: yes   Dressings Present: yes    Wound Concerns: yes      GI:    Current diet:  DIET NPO    Nausea: NO  Vomiting: NO  Bowel Sounds: YES  Flatus: YES  Last Bowel Movement: several days ago   Appearance:     Respiratory:  Supplemental O2: Yes      Device: N/C   via 2 Liters/min     Incentive Spirometer: YES  Volume:   Coughing and Deep Breathing: YES  Oral Care: YES  Understanding (patient/family education): YES   Getting out of bed: YES  Head of bed elevation: YES    Patient Safety:    Falls Score: 3  Mobility Score: 2  Bed Alarm On? Yes  Sitter? No      Opportunity for questions and clarification was given to oncoming nurse. Patient bed is in low position, side rails are up x 2, door & observation blinds open as needed, call bell within reach and patient not in distress.     Ann Marie Humphrey RN

## 2017-08-01 NOTE — PROGRESS NOTES
Admit Date: 2017    POD 6 Days Post-Op    Procedure:  Procedure(s):  LAPAROTOMY EXPLORATORY, REPAIR OF PERFORATED GASTRIC ULCER AND GRAM PATCH    Subjective:     Patient has no new complaints. Wants to go home    PT working with patient    Objective:     Blood pressure 141/77, pulse 76, temperature 97.9 °F (36.6 °C), resp. rate 16, height 5' 3\" (1.6 m), weight 168 lb 10.4 oz (76.5 kg), SpO2 94 %. Temp (24hrs), Av.6 °F (37 °C), Min:97.9 °F (36.6 °C), Max:99 °F (37.2 °C)      Physical Exam:  GENERAL: alert, cooperative, no distress, appears stated age, LUNG: nl effort, HEART: regular rate and rhythm, ABDOMEN: jennifer serous, EXTREMITIES:  extremities normal, atraumatic, no cyanosis or edema    Labs:   Recent Results (from the past 24 hour(s))   METABOLIC PANEL, BASIC    Collection Time: 17  4:49 AM   Result Value Ref Range    Sodium 143 136 - 145 mmol/L    Potassium 3.3 (L) 3.5 - 5.1 mmol/L    Chloride 111 (H) 97 - 108 mmol/L    CO2 21 21 - 32 mmol/L    Anion gap 11 5 - 15 mmol/L    Glucose 76 65 - 100 mg/dL    BUN 12 6 - 20 MG/DL    Creatinine 0.65 0.55 - 1.02 MG/DL    BUN/Creatinine ratio 18 12 - 20      GFR est AA >60 >60 ml/min/1.73m2    GFR est non-AA >60 >60 ml/min/1.73m2    Calcium 8.5 8.5 - 10.1 MG/DL   MAGNESIUM    Collection Time: 17  4:49 AM   Result Value Ref Range    Magnesium 1.7 1.6 - 2.4 mg/dL   CBC WITH AUTOMATED DIFF    Collection Time: 17  4:49 AM   Result Value Ref Range    WBC 11.6 (H) 3.6 - 11.0 K/uL    RBC 3.01 (L) 3.80 - 5.20 M/uL    HGB 10.6 (L) 11.5 - 16.0 g/dL    HCT 32.2 (L) 35.0 - 47.0 %    .0 (H) 80.0 - 99.0 FL    MCH 35.2 (H) 26.0 - 34.0 PG    MCHC 32.9 30.0 - 36.5 g/dL    RDW 12.8 11.5 - 14.5 %    PLATELET 669 343 - 018 K/uL    NEUTROPHILS 74 32 - 75 %    LYMPHOCYTES 19 12 - 49 %    MONOCYTES 4 (L) 5 - 13 %    EOSINOPHILS 2 0 - 7 %    BASOPHILS 1 0 - 1 %    ABS. NEUTROPHILS 8.6 (H) 1.8 - 8.0 K/UL    ABS. LYMPHOCYTES 2.2 0.8 - 3.5 K/UL    ABS.  MONOCYTES 0.5 0.0 - 1.0 K/UL    ABS. EOSINOPHILS 0.3 0.0 - 0.4 K/UL    ABS.  BASOPHILS 0.1 0.0 - 0.1 K/UL       Data Review reviewed  Consultants documentation, I & O and lab    Assessment:     Principal Problem:    Perforated viscus (7/26/2017)    Active Problems:    Chronic alcoholism ([facility] [address] (7/26/2017)      MELBA (acute kidney injury) ([facility] [address] (7/26/2017)    hypokalemia    Plan/Recommendations/Medical Decision Making:     Continue present treatment  Discontinue nasogastric tube  Diet  clear  supplement K    Jayro Fuller MD, [facility]  ED [facility]

## 2017-08-01 NOTE — PROGRESS NOTES
VAISHALI scheduled appointment for pt with Dr. Mindy Olmos on Sept. 1, 2017 at 1:00pm.    MIGUEL Billings CM  [phone]

## 2017-08-02 LAB
ANION GAP BLD CALC-SCNC: 9 MMOL/L (ref 5–15)
BACTERIA SPEC CULT: ABNORMAL
BUN SERPL-MCNC: 8 MG/DL (ref 6–20)
BUN/CREAT SERPL: 14 (ref 12–20)
CALCIUM SERPL-MCNC: 8.4 MG/DL (ref 8.5–10.1)
CHLORIDE SERPL-SCNC: 112 MMOL/L (ref 97–108)
CO2 SERPL-SCNC: 22 MMOL/L (ref 21–32)
CREAT SERPL-MCNC: 0.57 MG/DL (ref 0.55–1.02)
ERYTHROCYTE [DISTWIDTH] IN BLOOD BY AUTOMATED COUNT: 12.6 % (ref 11.5–14.5)
GLUCOSE SERPL-MCNC: 109 MG/DL (ref 65–100)
GRAM STN SPEC: ABNORMAL
GRAM STN SPEC: ABNORMAL
HCT VFR BLD AUTO: 32.2 % (ref 35–47)
HGB BLD-MCNC: 10.9 G/DL (ref 11.5–16)
MCH RBC QN AUTO: 36.3 PG (ref 26–34)
MCHC RBC AUTO-ENTMCNC: 33.9 G/DL (ref 30–36.5)
MCV RBC AUTO: 107.3 FL (ref 80–99)
PLATELET # BLD AUTO: 215 K/UL (ref 150–400)
POTASSIUM SERPL-SCNC: 3.5 MMOL/L (ref 3.5–5.1)
RBC # BLD AUTO: 3 M/UL (ref 3.8–5.2)
SERVICE CMNT-IMP: ABNORMAL
SODIUM SERPL-SCNC: 143 MMOL/L (ref 136–145)
WBC # BLD AUTO: 9.2 K/UL (ref 3.6–11)

## 2017-08-02 PROCEDURE — C9113 INJ PANTOPRAZOLE SODIUM, VIA: HCPCS | Performed by: SURGERY

## 2017-08-02 PROCEDURE — 74011250636 HC RX REV CODE- 250/636: Performed by: FAMILY MEDICINE

## 2017-08-02 PROCEDURE — 74011000258 HC RX REV CODE- 258: Performed by: SURGERY

## 2017-08-02 PROCEDURE — 74011250637 HC RX REV CODE- 250/637: Performed by: FAMILY MEDICINE

## 2017-08-02 PROCEDURE — 36415 COLL VENOUS BLD VENIPUNCTURE: CPT | Performed by: FAMILY MEDICINE

## 2017-08-02 PROCEDURE — 97116 GAIT TRAINING THERAPY: CPT

## 2017-08-02 PROCEDURE — 85027 COMPLETE CBC AUTOMATED: CPT | Performed by: FAMILY MEDICINE

## 2017-08-02 PROCEDURE — 74011000250 HC RX REV CODE- 250: Performed by: SURGERY

## 2017-08-02 PROCEDURE — 74011250637 HC RX REV CODE- 250/637: Performed by: INTERNAL MEDICINE

## 2017-08-02 PROCEDURE — 80048 BASIC METABOLIC PNL TOTAL CA: CPT | Performed by: FAMILY MEDICINE

## 2017-08-02 PROCEDURE — 65270000029 HC RM PRIVATE

## 2017-08-02 PROCEDURE — 74011250636 HC RX REV CODE- 250/636: Performed by: SURGERY

## 2017-08-02 RX ORDER — HYDROCODONE BITARTRATE AND ACETAMINOPHEN 5; 325 MG/1; MG/1
1-2 TABLET ORAL
Status: DISCONTINUED | OUTPATIENT
Start: 2017-08-02 | End: 2017-08-04 | Stop reason: HOSPADM

## 2017-08-02 RX ORDER — THERA TABS 400 MCG
1 TAB ORAL DAILY
Status: DISCONTINUED | OUTPATIENT
Start: 2017-08-02 | End: 2017-08-04 | Stop reason: HOSPADM

## 2017-08-02 RX ORDER — PANTOPRAZOLE SODIUM 40 MG/1
40 TABLET, DELAYED RELEASE ORAL 2 TIMES DAILY
Status: DISCONTINUED | OUTPATIENT
Start: 2017-08-02 | End: 2017-08-04 | Stop reason: HOSPADM

## 2017-08-02 RX ORDER — LANOLIN ALCOHOL/MO/W.PET/CERES
100 CREAM (GRAM) TOPICAL DAILY
Status: DISCONTINUED | OUTPATIENT
Start: 2017-08-02 | End: 2017-08-04 | Stop reason: HOSPADM

## 2017-08-02 RX ADMIN — CLARITHROMYCIN 500 MG: 500 TABLET ORAL at 20:40

## 2017-08-02 RX ADMIN — Medication 10 ML: at 04:20

## 2017-08-02 RX ADMIN — HYDROMORPHONE HYDROCHLORIDE 0.5 MG: 1 INJECTION, SOLUTION INTRAMUSCULAR; INTRAVENOUS; SUBCUTANEOUS at 07:37

## 2017-08-02 RX ADMIN — HYDROMORPHONE HYDROCHLORIDE 0.5 MG: 1 INJECTION, SOLUTION INTRAMUSCULAR; INTRAVENOUS; SUBCUTANEOUS at 04:13

## 2017-08-02 RX ADMIN — Medication 100 MG: at 09:26

## 2017-08-02 RX ADMIN — CLARITHROMYCIN 500 MG: 500 TABLET ORAL at 07:43

## 2017-08-02 RX ADMIN — THERA TABS 1 TABLET: TAB at 09:26

## 2017-08-02 RX ADMIN — HYDROCODONE BITARTRATE AND ACETAMINOPHEN 2 TABLET: 5; 325 TABLET ORAL at 12:18

## 2017-08-02 RX ADMIN — HYDROCODONE BITARTRATE AND ACETAMINOPHEN 2 TABLET: 5; 325 TABLET ORAL at 23:32

## 2017-08-02 RX ADMIN — PIPERACILLIN SODIUM,TAZOBACTAM SODIUM 3.38 G: 3; .375 INJECTION, POWDER, FOR SOLUTION INTRAVENOUS at 04:13

## 2017-08-02 RX ADMIN — PIPERACILLIN SODIUM,TAZOBACTAM SODIUM 3.38 G: 3; .375 INJECTION, POWDER, FOR SOLUTION INTRAVENOUS at 12:17

## 2017-08-02 RX ADMIN — HYDROCODONE BITARTRATE AND ACETAMINOPHEN 2 TABLET: 5; 325 TABLET ORAL at 16:10

## 2017-08-02 RX ADMIN — HYDROMORPHONE HYDROCHLORIDE 0.5 MG: 1 INJECTION, SOLUTION INTRAMUSCULAR; INTRAVENOUS; SUBCUTANEOUS at 20:38

## 2017-08-02 RX ADMIN — PANTOPRAZOLE SODIUM 40 MG: 40 TABLET, DELAYED RELEASE ORAL at 19:10

## 2017-08-02 RX ADMIN — SODIUM CHLORIDE AND POTASSIUM CHLORIDE: 9; 2.98 INJECTION, SOLUTION INTRAVENOUS at 12:20

## 2017-08-02 RX ADMIN — SODIUM CHLORIDE 40 MG: 9 INJECTION INTRAMUSCULAR; INTRAVENOUS; SUBCUTANEOUS at 07:42

## 2017-08-02 RX ADMIN — Medication 10 ML: at 20:38

## 2017-08-02 RX ADMIN — PIPERACILLIN SODIUM,TAZOBACTAM SODIUM 3.38 G: 3; .375 INJECTION, POWDER, FOR SOLUTION INTRAVENOUS at 20:40

## 2017-08-02 NOTE — PROGRESS NOTES
Surgery      Pt complains of operative site pain, resolves with pain med    Wants to go home    Torey clears    BASSAM    Cont present RX  Decrease IV fluids, increase diet  Transition to po meds  ? Discharge on Friday        Funmilayo Lea.  Chantel Jang MD, [facility]

## 2017-08-02 NOTE — PROGRESS NOTES
Hospitalist Progress Note    NAME: Charleen Goel   :  1950   MRN:  [id number]       Assessment / Plan:  Acute kidney injury  resolved with IVF    Hypokalemia  Monitor and replete PRN    Alcohol abuse POA  Last drink 17  Continue CIWA with ativan, probably already gone through withdrawal period  Switch Banana bag to PO thiamine/MVIs  Drinking cessation encouraged in presence of  multiple times during admission     Perforated prepyloric peptic ulcer POA  Peritonitis causing Sepsis, POA due to perforated ulcer  S/p exploratory laparotomy and repair of ulcer and Melvin patch 17  Continue IV PPI  H. Pylori Ab panel with +IgA  On Zosyn and clarithromycin  OP f/u with GI (CM made an appointment to see Dr Nelson )  Under surgery care     Hx of HTN  BP currently normal without medication. Hypophosphatemia  Improved    Body mass index is 29.88 kg/(m^2). Subjective: Pt seen and examined at bedside. NG tube out today. NAD. Overnight events d/w RN     Chief Complaint / Reason for Physician Visit: f/u \"medical management\"    Review of Systems:  Symptom Y/N Comments  Symptom Y/N Comments   Fever/Chills n   Chest Pain n    Poor Appetite    Edema n    Cough n   Abdominal Pain y At surgery site   Sputum    Joint Pain     SOB/ABEBE n   Pruritis/Rash     Nausea/vomit n   Tolerating PT/OT     Diarrhea    Tolerating Diet     Constipation    Tremors       Could NOT obtain due to:      Objective:     VITALS:   Last 24hrs VS reviewed since prior progress note.  Most recent are:  Patient Vitals for the past 24 hrs:   Temp Pulse Resp BP SpO2   17 0354 98.4 °F (36.9 °C) 85 18 137/55 93 %   17 2311 97.8 °F (36.6 °C) 86 16 150/78 92 %   17 2004 97.8 °F (36.6 °C) 84 16 150/75 94 %   17 1542 97.8 °F (36.6 °C) 84 15 135/71 94 %   17 1125 98 °F (36.7 °C) 86 18 129/66 93 %       Intake/Output Summary (Last 24 hours) at 17 0846  Last data filed at 17 0412   Gross per 24 hour   Intake          1557.08 ml   Output              820 ml   Net           737.08 ml        PHYSICAL EXAM:  General: WD, Obese. Alert, cooperative, no acute distress    EENT:  EOMI. Anicteric sclerae. MM dry; NG in place  Resp:  CTA bilaterally, no wheezing or rales. No accessory muscle use  CV:  Regular  rhythm,  No edema  GI:  Did not palpate abdomen but bowel sounds improving  Neurologic:  Alert and oriented X 3, normal speech,   Psych:   Fair insight. Not anxious nor agitated  Skin:  No rashes. No jaundice    Reviewed most current lab test results and cultures  YES  Reviewed most current radiology test results   YES  Review and summation of old records today    NO  Reviewed patient's current orders and MAR    YES  PMH/SH reviewed - no change compared to H&P  ________________________________________________________________________  Care Plan discussed with:    Comments   Patient x    Family      RN x    Care Manager     Consultant                        Multidiciplinary team rounds were held today with , nursing, pharmacist and clinical coordinator. Patient's plan of care was discussed; medications were reviewed and discharge planning was addressed. ________________________________________________________________________  Total NON critical care TIME: 20 Minutes    Total CRITICAL CARE TIME Spent:   Minutes non procedure based      Comments   >50% of visit spent in counseling and coordination of care     ________________________________________________________________________  Pepper Tam MD     Procedures: see electronic medical records for all procedures/Xrays and details which were not copied into this note but were reviewed prior to creation of Plan. LABS:  I reviewed today's most current labs and imaging studies.   Pertinent labs include:  Recent Labs      08/02/17   0440  08/01/17   0449  07/31/17   0401   WBC  9.2  11.6*  12.0*   HGB  10.9*  10.6*  11.7   HCT  32.2*  32.2*  35.5 PLT  215  195  188     Recent Labs      08/02/17   0440 08/01/17   0449  07/31/17   0401   NA  143  143  141   K  3.5  3.3*  3.5   CL  112*  111*  108   CO2  22  21  21   GLU  109*  76  75   BUN  8  12  15   CREA  0.57  0.65  0.76   CA  8.4*  8.5  8.6   MG   --   1.7  1.7   PHOS   --    --   3.2   ALB   --    --   2.1*   TBILI   --    --   1.1*   SGOT   --    --   33   ALT   --    --   38       Signed: Carmen Fields MD

## 2017-08-02 NOTE — PROGRESS NOTES
Problem: Mobility Impaired (Adult and Pediatric)  Goal: *Acute Goals and Plan of Care (Insert Text)  Physical Therapy Goals  Initiated 7/28/2017  1. Patient will move from supine to sit and sit to supine , scoot up and down and roll side to side in bed with supervision/set-up within 7 day(s). 2. Patient will transfer from bed to chair and chair to bed with supervision/set-up using the least restrictive device within 7 day(s). 3. Patient will perform sit to stand with supervision/set-up within 7 day(s). 4. Patient will ambulate with supervision/set-up for 150 feet with the least restrictive device within 7 day(s). 5. Patient will ascend/descend 4 stairs with dual handrail(s) with minimal assistance/contact guard assist within 7 day(s). PHYSICAL THERAPY TREATMENT  Patient: Hany Garcia (9 y.o. female)  Date: 8/2/2017  Diagnosis: Perperated viscous     Procedure(s) (LRB):  LAPAROTOMY EXPLORATORY, REPAIR OF PERFORATED GASTRIC ULCER AND GRAM PATCH (Bilateral) 7 Days Post-Op      Precautions:  falls  Chart, physical therapy assessment, plan of care and goals were reviewed. ASSESSMENT: pt with good improvement today, improved endurance, no LOB or SOB, needed several standing rest breaks, vc's for safety and proper RW use. Progression toward goals:  [ ]      Improving appropriately and progressing toward goals  [X]      Improving slowly and progressing toward goals  [ ]      Not making progress toward goals and plan of care will be adjusted       PLAN:  Patient continues to benefit from skilled intervention to address the above impairments. Continue treatment per established plan of care.   Discharge Recommendations:  Edward Shetty  Further Equipment Recommendations for Discharge:  rolling walker       OBJECTIVE DATA SUMMARY:      Critical Behavior:  Neurologic State: Alert  Orientation Level: Oriented X4  Cognition: Appropriate decision making, Appropriate for age attention/concentration, Appropriate safety awareness  Safety/Judgement: Awareness of environment      Functional Mobility Training:  Bed Mobility: pt sitting in chair on arrival     Transfers:  Sit to Stand: Contact guard assistance  Stand to Sit: Contact guard assistance  Interventions: Tactile cues; Verbal cues  Level of Assistance: Contact guard assistance      Balance:  Sitting: Intact; Without support  Standing: Intact; With support  Standing - Static: Good;Constant support  Standing - Dynamic : Good      Ambulation/Gait Training:  Distance (ft): 120 Feet (ft)  Assistive Device: Gait belt;Walker, rolling  Ambulation - Level of Assistance: Contact guard assistance  Gait Abnormalities: Decreased step clearance  Right Side Weight Bearing: Full  Left Side Weight Bearing: Full  Base of Support: Widened  Stance:  (equal)  Speed/Rita: Slow  Step Length: Left shortened;Right shortened     Pain:  Pain Scale 1: Numeric (0 - 10)  Pain Intensity 1: 5  Pain Location 1: Abdomen  Pain Orientation 1: Anterior  Pain Description 1: Aching  Pain Intervention(s) 1: Medication (see MAR)      Activity Tolerance: poor     After treatment:   [X] Patient left in no apparent distress sitting up in chair  [ ] Patient left in no apparent distress in bed  [X] Call bell left within reach  [X] Nursing notified  [X] Caregiver present  [ ] Bed alarm activated      COMMUNICATION/COLLABORATION:   The patients plan of care was discussed with: Registered Nurse     Con Calamity, PTA   Time Calculation: 20 mins

## 2017-08-02 NOTE — PROGRESS NOTES
Interdisciplinary Rounds were completed on this patient. Rounds included nursing, clinical care leader, pharmacy, and case management. Patient was sitting up in chair, tearful. Patient had the following concerns: wants to go home, pain. Goals for the day will include: mobilize and pain management.      Anticipated discharge date: 8/4/2017

## 2017-08-03 PROCEDURE — 74011250637 HC RX REV CODE- 250/637: Performed by: FAMILY MEDICINE

## 2017-08-03 PROCEDURE — 65270000029 HC RM PRIVATE

## 2017-08-03 PROCEDURE — 74011250636 HC RX REV CODE- 250/636: Performed by: SURGERY

## 2017-08-03 PROCEDURE — 74011250636 HC RX REV CODE- 250/636: Performed by: FAMILY MEDICINE

## 2017-08-03 PROCEDURE — 74011250637 HC RX REV CODE- 250/637: Performed by: INTERNAL MEDICINE

## 2017-08-03 PROCEDURE — 74011000258 HC RX REV CODE- 258: Performed by: SURGERY

## 2017-08-03 PROCEDURE — 97116 GAIT TRAINING THERAPY: CPT

## 2017-08-03 PROCEDURE — 97530 THERAPEUTIC ACTIVITIES: CPT

## 2017-08-03 RX ORDER — AMOXICILLIN 250 MG/1
1000 CAPSULE ORAL EVERY 12 HOURS
Status: DISCONTINUED | OUTPATIENT
Start: 2017-08-03 | End: 2017-08-04 | Stop reason: HOSPADM

## 2017-08-03 RX ADMIN — LOPERAMIDE HYDROCHLORIDE 1 MG: 1 SOLUTION ORAL at 21:27

## 2017-08-03 RX ADMIN — PANTOPRAZOLE SODIUM 40 MG: 40 TABLET, DELAYED RELEASE ORAL at 17:42

## 2017-08-03 RX ADMIN — CLARITHROMYCIN 500 MG: 500 TABLET ORAL at 08:24

## 2017-08-03 RX ADMIN — CLARITHROMYCIN 500 MG: 500 TABLET ORAL at 21:27

## 2017-08-03 RX ADMIN — HYDROCODONE BITARTRATE AND ACETAMINOPHEN 2 TABLET: 5; 325 TABLET ORAL at 04:39

## 2017-08-03 RX ADMIN — THERA TABS 1 TABLET: TAB at 08:25

## 2017-08-03 RX ADMIN — SODIUM CHLORIDE AND POTASSIUM CHLORIDE: 9; 2.98 INJECTION, SOLUTION INTRAVENOUS at 04:25

## 2017-08-03 RX ADMIN — Medication 100 MG: at 08:24

## 2017-08-03 RX ADMIN — HYDROCODONE BITARTRATE AND ACETAMINOPHEN 2 TABLET: 5; 325 TABLET ORAL at 08:35

## 2017-08-03 RX ADMIN — AMOXICILLIN 1000 MG: 250 CAPSULE ORAL at 17:42

## 2017-08-03 RX ADMIN — PIPERACILLIN SODIUM,TAZOBACTAM SODIUM 3.38 G: 3; .375 INJECTION, POWDER, FOR SOLUTION INTRAVENOUS at 04:25

## 2017-08-03 RX ADMIN — PANTOPRAZOLE SODIUM 40 MG: 40 TABLET, DELAYED RELEASE ORAL at 08:24

## 2017-08-03 NOTE — PROGRESS NOTES
Problem: Mobility Impaired (Adult and Pediatric)  Goal: *Acute Goals and Plan of Care (Insert Text)  Physical Therapy Goals  Initiated 7/28/2017  1. Patient will move from supine to sit and sit to supine , scoot up and down and roll side to side in bed with supervision/set-up within 7 day(s). 2. Patient will transfer from bed to chair and chair to bed with supervision/set-up using the least restrictive device within 7 day(s). 3. Patient will perform sit to stand with supervision/set-up within 7 day(s). 4. Patient will ambulate with supervision/set-up for 150 feet with the least restrictive device within 7 day(s). 5. Patient will ascend/descend 4 stairs with dual handrail(s) with minimal assistance/contact guard assist within 7 day(s). PHYSICAL THERAPY TREATMENT  Patient: Nemo Florian (18 y.o. female)  Date: 8/3/2017  Diagnosis: perperated viscous     Procedure(s) (LRB):  LAPAROTOMY EXPLORATORY, REPAIR OF PERFORATED GASTRIC ULCER AND GRAM PATCH (Bilateral) 8 Days Post-Op      Precautions:  falls  Chart, physical therapy assessment, plan of care and goals were reviewed. ASSESSMENT: pt continues to improve, no LOB or SOB, did well with toilet tranfers and bed mob, eager to go home but could use short term SNF for increased strength and endurance, vc's for safety and proper RW use. Progression toward goals:  [ ]      Improving appropriately and progressing toward goals  [X]      Improving slowly and progressing toward goals  [ ]      Not making progress toward goals and plan of care will be adjusted       PLAN:  Patient continues to benefit from skilled intervention to address the above impairments. Continue treatment per established plan of care.   Discharge Recommendations:  Edward Shetty  Further Equipment Recommendations for Discharge:  rolling walker       OBJECTIVE DATA SUMMARY:      Critical Behavior:  Neurologic State: Alert  Orientation Level: Oriented X4  Cognition: Appropriate decision making, Appropriate for age attention/concentration, Appropriate safety awareness  Safety/Judgement: Awareness of environment      Functional Mobility Training:  Bed Mobility:  Rolling: Supervision  Supine to Sit: Supervision  Scooting: Supervision  Level of Assistance: Supervision              Interventions: Verbal cues      Transfers:  Sit to Stand: Contact guard assistance  Stand to Sit: Stand-by asssistance  Bed to Chair: Contact guard assistance  Interventions: Tactile cues; Verbal cues  Level of Assistance: Contact guard assistance      Balance:  Sitting: Intact; Without support  Standing: Intact; With support  Standing - Static: Good;Constant support  Standing - Dynamic : Good      Ambulation/Gait Training:  Distance (ft): 140 Feet (ft)  Assistive Device: Gait belt;Walker, rolling  Ambulation - Level of Assistance: Contact guard assistance  Gait Abnormalities: Decreased step clearance; Foot drop  Right Side Weight Bearing: Full  Left Side Weight Bearing: Full  Base of Support: Widened  Stance:  (equal)  Speed/Rita: Pace decreased (<100 feet/min)  Step Length: Left shortened;Right shortened     Pain:  Pain Scale 1: Numeric (0 - 10)  Pain Intensity 1: 0     Activity Tolerance: fair     After treatment:   [X] Patient left in no apparent distress sitting up in chair  [ ] Patient left in no apparent distress in bed  [X] Call bell left within reach  [X] Nursing notified  [ ] Caregiver present  [X] Bed alarm activated      COMMUNICATION/COLLABORATION:   The patients plan of care was discussed with: Registered Nurse     Sukumar Sarah PTA   Time Calculation: 25 mins

## 2017-08-03 NOTE — PROGRESS NOTES
Hospitalist Progress Note    NAME: Gayle Lawson   :  1950   MRN:  [id number]       Assessment / Plan:  Acute kidney injury  resolved with IVF    Hypokalemia  Monitor and replete PRN    Alcohol abuse POA  Last drink 17  Continue CIWA with ativan, probably already gone through withdrawal period  Switch Banana bag to PO thiamine/MVIs  Drinking cessation encouraged in presence of  multiple times during admission     Perforated prepyloric peptic ulcer POA  Peritonitis causing Sepsis, POA due to perforated ulcer  S/p exploratory laparotomy and repair of ulcer and Melvin patch 17  Continue IV PPI  H. Pylori Ab panel with +IgA  On Zosyn and clarithromycin  Consider Amoxacillin 1000mg PO BID and Clarithromycin 500mg PO BID to complete 14 days of abx + Prilosec 40mg PO BID upon discharge  OP f/u with GI (CM made an appointment to see Dr Johnathon Moore)  Under surgery care     Hx of HTN  BP currently normal without medication. Hypophosphatemia  Improved    Body mass index is 29.88 kg/(m^2). Subjective: Pt seen and examined at bedside. NG tube out today. Feel much better, willing to go home. Overnight events d/w RN     Chief Complaint / Reason for Physician Visit: f/u \"medical management\"    Review of Systems:  Symptom Y/N Comments  Symptom Y/N Comments   Fever/Chills n   Chest Pain n    Poor Appetite    Edema n    Cough n   Abdominal Pain y At surgery site   Sputum    Joint Pain     SOB/ABEBE n   Pruritis/Rash     Nausea/vomit n   Tolerating PT/OT     Diarrhea    Tolerating Diet     Constipation    Tremors       Could NOT obtain due to:      Objective:     VITALS:   Last 24hrs VS reviewed since prior progress note.  Most recent are:  Patient Vitals for the past 24 hrs:   Temp Pulse Resp BP SpO2   17 1532 98.7 °F (37.1 °C) 88 16 152/79 92 %   17 1122 97.8 °F (36.6 °C) 83 15 139/76 92 %   17 0727 98 °F (36.7 °C) 80 14 150/73 93 %   17 0323 98 °F (36.7 °C) 78 16 148/79 96 % 08/02/17 2329 98 °F (36.7 °C) 80 18 151/85 94 %   08/02/17 2031 97.8 °F (36.6 °C) 80 18 152/85 95 %       Intake/Output Summary (Last 24 hours) at 08/03/17 1635  Last data filed at 08/03/17 1547   Gross per 24 hour   Intake          1698.75 ml   Output              730 ml   Net           968.75 ml        PHYSICAL EXAM:  General: WD, Obese. Alert, cooperative, no acute distress    EENT:  EOMI. Anicteric sclerae. MM dry; NG in place  Resp:  CTA bilaterally, no wheezing or rales. No accessory muscle use  CV:  Regular  rhythm,  No edema  GI:  Did not palpate abdomen but bowel sounds improving  Neurologic:  Alert and oriented X 3, normal speech,   Psych:   Fair insight. Not anxious nor agitated  Skin:  No rashes. No jaundice    Reviewed most current lab test results and cultures  YES  Reviewed most current radiology test results   YES  Review and summation of old records today    NO  Reviewed patient's current orders and MAR    YES  PMH/SH reviewed - no change compared to H&P  ________________________________________________________________________  Care Plan discussed with:    Comments   Patient x    Family      RN x    Care Manager     Consultant                        Multidiciplinary team rounds were held today with , nursing, pharmacist and clinical coordinator. Patient's plan of care was discussed; medications were reviewed and discharge planning was addressed. ________________________________________________________________________  Total NON critical care TIME: 20 Minutes    Total CRITICAL CARE TIME Spent:   Minutes non procedure based      Comments   >50% of visit spent in counseling and coordination of care     ________________________________________________________________________  Isabella Ramirez MD     Procedures: see electronic medical records for all procedures/Xrays and details which were not copied into this note but were reviewed prior to creation of Plan.       LABS:  I reviewed today's most current labs and imaging studies.   Pertinent labs include:  Recent Labs      08/02/17 0440 08/01/17 0449   WBC  9.2  11.6*   HGB  10.9*  10.6*   HCT  32.2*  32.2*   PLT  215  195     Recent Labs      08/02/17 0440 08/01/17 0449   NA  143  143   K  3.5  3.3*   CL  112*  111*   CO2  22  21   GLU  109*  76   BUN  8  12   CREA  0.57  0.65   CA  8.4*  8.5   MG   --   1.7       Signed: Charisse Acosta MD

## 2017-08-03 NOTE — PROGRESS NOTES
End of Shift Nursing Note    Bedside shift change report given to CAROLYNN Quintanilla (oncoming nurse) by Vinh Scanlon RN(offgoing nurse). Report included the following information SBAR. [facility] Phone:   [phone]    Significant changes during shift:    none   Non-emergent issues for physician to address:   none     Number times ambulated in hallway past shift: in room      Number of times OOB to chair past shift: 2    POD #: 8    Vital Signs:    Temp: 98 °F (36.7 °C)     Pulse (Heart Rate): 78     BP: 148/79     Resp Rate: 16     O2 Sat (%): 96 %    Lines & Drains:     Urinary Catheter? No     Central Line: No    PICC Line? No       NG tube [] in [] removed [x] not applicable   Drains [x] in  BASSAM to abdomen [] removed [] not applicable     Skin Integrity:      Wounds: no   Dressings Present: yes    Wound Concerns: no      GI:    Current diet:  DIET FULL LIQUID    Nausea: NO  Vomiting: NO  Bowel Sounds: YES  Flatus: YES  Last Bowel Movement: today   Appearance: loose    Respiratory:  Supplemental O2: No      Device: RA       Incentive Spirometer: YES  Volume: fair  Coughing and Deep Breathing: YES  Oral Care: YES  Understanding (patient/family education): YES   Getting out of bed: YES  Head of bed elevation: YES    Patient Safety:    Falls Score: 4  Mobility Score: 2  Bed Alarm On? Yes  Sitter? No      Opportunity for questions and clarification was given to oncoming nurse. Patient bed is in supine position, side rails are up x 2, door & observation blinds open as needed, call bell within reach and patient not in distress.     Vinh Scanlon RN

## 2017-08-03 NOTE — PROGRESS NOTES
Interdisciplinary Rounds were completed on this patient. Rounds included nursing, clinical care leader, pharmacy, and case management. Patient was doing well without problems. Patient had the following concerns: none. Goals for the day will include: mobilize.      Anticipated discharge date: 8/4/2017

## 2017-08-03 NOTE — PROGRESS NOTES
Surgery      Some nausea this afternoon    All meds to PO    Copnt present RX    Plan discharge tomorrow      Lilian Skinner MD, [facility]  ED [facility]

## 2017-08-04 ENCOUNTER — HOME HEALTH ADMISSION (OUTPATIENT)
Dept: HOME HEALTH SERVICES | Facility: HOME HEALTH | Age: 67
End: 2017-08-04
Payer: COMMERCIAL

## 2017-08-04 VITALS
TEMPERATURE: 97.9 F | BODY MASS INDEX: 29.88 KG/M2 | WEIGHT: 168.65 LBS | RESPIRATION RATE: 16 BRPM | SYSTOLIC BLOOD PRESSURE: 145 MMHG | DIASTOLIC BLOOD PRESSURE: 68 MMHG | OXYGEN SATURATION: 95 % | HEIGHT: 63 IN | HEART RATE: 96 BPM

## 2017-08-04 PROCEDURE — 74011250637 HC RX REV CODE- 250/637: Performed by: INTERNAL MEDICINE

## 2017-08-04 PROCEDURE — 74011250637 HC RX REV CODE- 250/637: Performed by: FAMILY MEDICINE

## 2017-08-04 PROCEDURE — 97116 GAIT TRAINING THERAPY: CPT | Performed by: PHYSICAL THERAPIST

## 2017-08-04 RX ORDER — AMOXICILLIN 500 MG/1
1000 CAPSULE ORAL EVERY 12 HOURS
Qty: 56 CAP | Refills: 0 | Status: SHIPPED | OUTPATIENT
Start: 2017-08-04 | End: 2017-08-18

## 2017-08-04 RX ORDER — HYDROCODONE BITARTRATE AND ACETAMINOPHEN 5; 325 MG/1; MG/1
1-2 TABLET ORAL
Qty: 30 TAB | Refills: 0 | Status: SHIPPED | OUTPATIENT
Start: 2017-08-04 | End: 2017-11-09

## 2017-08-04 RX ORDER — CLARITHROMYCIN 500 MG/1
500 TABLET, FILM COATED ORAL EVERY 12 HOURS
Qty: 28 TAB | Refills: 0 | Status: SHIPPED | OUTPATIENT
Start: 2017-08-04 | End: 2017-08-18

## 2017-08-04 RX ORDER — PANTOPRAZOLE SODIUM 40 MG/1
40 TABLET, DELAYED RELEASE ORAL 2 TIMES DAILY
Qty: 60 TAB | Refills: 1 | Status: SHIPPED | OUTPATIENT
Start: 2017-08-04 | End: 2022-06-21

## 2017-08-04 RX ADMIN — PANTOPRAZOLE SODIUM 40 MG: 40 TABLET, DELAYED RELEASE ORAL at 10:07

## 2017-08-04 RX ADMIN — CLARITHROMYCIN 500 MG: 500 TABLET ORAL at 10:07

## 2017-08-04 RX ADMIN — THERA TABS 1 TABLET: TAB at 10:07

## 2017-08-04 RX ADMIN — Medication 100 MG: at 10:07

## 2017-08-04 RX ADMIN — AMOXICILLIN 1000 MG: 250 CAPSULE ORAL at 06:33

## 2017-08-04 RX ADMIN — LOPERAMIDE HYDROCHLORIDE 1 MG: 1 SOLUTION ORAL at 10:07

## 2017-08-04 NOTE — DISCHARGE INSTRUCTIONS
Peptic Ulcer Disease: Care Instructions  Your Care Instructions    Peptic ulcers are sores on the inside of the stomach or the small intestine. They are usually caused by an infection with bacteria or from use of nonsteroidal anti-inflammatory drugs (NSAIDs). NSAIDs include aspirin, ibuprofen (Advil), and naproxen (Aleve). Your doctor may have prescribed medicine to reduce stomach acid. You also may need to take antibiotics if your peptic ulcers are caused by an infection. You can help your stomach heal and keep ulcers from coming back by making some changes in your lifestyle. Quit smoking, limit caffeine and alcohol, and reduce stress. Follow-up care is a key part of your treatment and safety. Be sure to make and go to all appointments, and call your doctor if you are having problems. Its also a good idea to know your test results and keep a list of the medicines you take. How can you care for yourself at home? · Take your medicines exactly as prescribed. Call your doctor if you think you are having a problem with your medicine. · Do not take aspirin or other NSAIDs such as ibuprofen (Advil or Motrin) or naproxen (Aleve). Ask your doctor what you can take for pain. · Do not smoke. Smoking can make ulcers worse. If you need help quitting, talk to your doctor about stop-smoking programs and medicines. These can increase your chances of quitting for good. · Drink in moderation or avoid drinking alcohol. · Do not drink beverages that have caffeine if they bother your stomach. These include coffee, tea, and soda. · Eat a balanced diet of small, frequent meals. Make an appointment with a dietitian if you need help planning your meals. · Reduce stress. Avoid people and places that make you feel anxious, if you can. Learn ways to reduce stress, such as biofeedback, guided imagery, and meditation. When should you call for help? Call 911 anytime you think you may need emergency care.  For example, call if:  · You passed out (lost consciousness). · You vomit blood or what looks like coffee grounds. · You pass maroon or very bloody stools. Call your doctor now or seek immediate medical care if:  · You have severe pain in your belly, back, or shoulders. · You have new or worsening belly pain. · You are dizzy or lightheaded, or you feel like you may faint. · Your stools are black and tarlike or have streaks of blood. Watch closely for changes in your health, and be sure to contact your doctor if:  · You have new symptoms such as weight loss, nausea or vomiting. · You do not feel better as expected. Where can you learn more? Go to http://gemini-feliciano.info/. Enter Y660 in the search box to learn more about \"Peptic Ulcer Disease: Care Instructions. \"  Current as of: August 9, 2016  Content Version: 11.3  © 2217-8947 [employer]. Care instructions adapted under license by [employer] (which disclaims liability or warranty for this information). If you have questions about a medical condition or this instruction, always ask your healthcare professional. [place] [address] any warranty or liability for your use of this information. H. Pylori Bacterial Infection: Care Instructions  Your Care Instructions    Your test shows the presence of Helicobacter pylori (H. pylori), a kind of bacterium that lives in the lining of the stomach. Many people have H. pylori in their stomachs and do not develop problems. But sometimes H. pylori causes an upset stomach or a sore (ulcer) in the stomach lining. Most stomach ulcers are caused by H. pylori. Symptoms of an ulcer include gnawing or burning pain in the belly that can last minutes or hours. Eating food or taking antacids helps relieve the pain, but the symptoms may come back after a while. Antibiotic medicine can cure an H. pylori infection. Follow-up care is a key part of your treatment and safety.  Be sure to make and go to all appointments, and call your doctor if you are having problems. Its also a good idea to know your test results and keep a list of the medicines you take. How can you care for yourself at home? · Take your antibiotics as directed. Do not stop taking them just because you feel better. You need to take the full course of antibiotics. · If your doctor prescribes other medicine, take it exactly as prescribed. Call your doctor if you think you are having a problem with your medicine. You will get more details on the specific medicine your doctor prescribes. · Eat a healthy, balanced diet. ¨ Eat smaller meals, and eat more often. Be sure to eat at least three meals a day. ¨ Avoid heavily spiced or greasy foods. ¨ Do not drink beverages that have caffeine if they bother your stomach. These include coffee, tea, and soda. · Do not smoke. Smoking slows the healing of your ulcer and can make an ulcer come back. If you need help quitting, talk to your doctor about stop-smoking programs and medicines. These can increase your chances of quitting for good. · Limit how much alcohol you drink. Alcohol can slow healing of an ulcer and can make your symptoms worse. · Wash your hands after going to the bathroom. · Avoid aspirin, ibuprofen, or other anti-inflammatory medicines, because they can irritate the stomach. If you need pain medicine, try acetaminophen (Tylenol). When should you call for help? Call 911 anytime you think you may need emergency care. For example, call if:  · You passed out (lost consciousness). · You vomit blood or what looks like coffee grounds. · You pass maroon or very bloody stools. Call your doctor now or seek immediate medical care if:  · You have severe pain in your belly, back, or shoulders. · You have new or worsening belly pain. · You are dizzy or lightheaded, or you feel like you may faint. · Your stools are black and tarlike or have streaks of blood.   Watch closely for changes in your health, and be sure to contact your doctor if:  · You have new symptoms such as weight loss, nausea, or vomiting. · You do not feel better as expected. Where can you learn more? Go to http://gemini-feliciano.info/. Enter Z937 in the search box to learn more about \"H. Pylori Bacterial Infection: Care Instructions. \"  Current as of: November 21, 2016  Content Version: 11.3  © 7361-2290 [employer]. Care instructions adapted under license by [employer] (which disclaims liability or warranty for this information). If you have questions about a medical condition or this instruction, always ask your healthcare professional. [address] any warranty or liability for your use of this information.

## 2017-08-04 NOTE — PROGRESS NOTES
Hospitalist Progress Note    NAME: Johanna Ross   :  1950   MRN:  [id number]       Assessment / Plan: H.Pylori  If discharge then consider complete 14 days treatment with Amoxacillin 1000mg PO BID and Clarithromycin 500mg PO BID to complete 14 days of abx   + Prilosec 40mg PO BID atleast for a month upon discharge    Acute kidney injury  resolved with IVF    Hypokalemia  Monitor and replete PRN    Alcohol abuse POA  Last drink 17  Continue CIWA with ativan, probably already gone through withdrawal period  Switch Banana bag to PO thiamine/MVIs  Drinking cessation encouraged in presence of  multiple times during admission     Perforated prepyloric peptic ulcer POA  Peritonitis causing Sepsis, POA due to perforated ulcer  S/p exploratory laparotomy and repair of ulcer and Melvin patch 17  Continue IV PPI  H. Pylori Ab panel with +IgA  H. Pylori treatment as recommended as above  OP f/u with GI (CM made an appointment to see Dr Piyush Adler)  Under surgery care     Hx of HTN  BP currently normal without medication. Hypophosphatemia  Improved    Body mass index is 29.88 kg/(m^2). Subjective: Pt seen and examined at bedside. NG tube out today. Feel much better, willing to go home. Overnight events d/w RN     Chief Complaint / Reason for Physician Visit: f/u \"medical management\"    Review of Systems:  Symptom Y/N Comments  Symptom Y/N Comments   Fever/Chills n   Chest Pain n    Poor Appetite    Edema n    Cough n   Abdominal Pain y At surgery site   Sputum    Joint Pain     SOB/ABEBE n   Pruritis/Rash     Nausea/vomit n   Tolerating PT/OT     Diarrhea    Tolerating Diet     Constipation    Tremors       Could NOT obtain due to:      Objective:     VITALS:   Last 24hrs VS reviewed since prior progress note.  Most recent are:  Patient Vitals for the past 24 hrs:   Temp Pulse Resp BP SpO2   17 0352 99.5 °F (37.5 °C) 90 16 134/68 93 %   17 2358 99.8 °F (37.7 °C) 94 16 138/71 92 % 08/03/17 2007 98.5 °F (36.9 °C) 90 16 159/84 93 %   08/03/17 1532 98.7 °F (37.1 °C) 88 16 152/79 92 %   08/03/17 1122 97.8 °F (36.6 °C) 83 15 139/76 92 %       Intake/Output Summary (Last 24 hours) at 08/04/17 0905  Last data filed at 08/03/17 1547   Gross per 24 hour   Intake                0 ml   Output               30 ml   Net              -30 ml        PHYSICAL EXAM:  General: WD, Obese. Alert, cooperative, no acute distress    EENT:  EOMI. Anicteric sclerae. MM dry; NG in place  Resp:  CTA bilaterally, no wheezing or rales. No accessory muscle use  CV:  Regular  rhythm,  No edema  GI:  Did not palpate abdomen but bowel sounds improving  Neurologic:  Alert and oriented X 3, normal speech,   Psych:   Fair insight. Not anxious nor agitated  Skin:  No rashes. No jaundice    Reviewed most current lab test results and cultures  YES  Reviewed most current radiology test results   YES  Review and summation of old records today    NO  Reviewed patient's current orders and MAR    YES  PMH/SH reviewed - no change compared to H&P  ________________________________________________________________________  Care Plan discussed with:    Comments   Patient x    Family      RN x    Care Manager     Consultant                        Multidiciplinary team rounds were held today with , nursing, pharmacist and clinical coordinator. Patient's plan of care was discussed; medications were reviewed and discharge planning was addressed.      ________________________________________________________________________  Total NON critical care TIME: 12 Minutes    Total CRITICAL CARE TIME Spent:   Minutes non procedure based      Comments   >50% of visit spent in counseling and coordination of care     ________________________________________________________________________  Pepper Tam MD     Procedures: see electronic medical records for all procedures/Xrays and details which were not copied into this note but were reviewed prior to creation of Plan. LABS:  I reviewed today's most current labs and imaging studies.   Pertinent labs include:  Recent Labs      08/02/17   0440   WBC  9.2   HGB  10.9*   HCT  32.2*   PLT  215     Recent Labs      08/02/17   0440   NA  143   K  3.5   CL  112*   CO2  22   GLU  109*   BUN  8   CREA  0.57   CA  8.4*       Signed: Nicol Almazan MD

## 2017-08-04 NOTE — PROGRESS NOTES
Interdisciplinary Rounds were completed on this patient. Rounds included nursing, clinical care leader, pharmacy, and case management. Patient was doing well without problems. Patient had the following concerns: none.      Goals for the day will include: mobilize and possible discharge, patient interested in home health PT

## 2017-08-04 NOTE — DISCHARGE SUMMARY
Physician Discharge Summary     Patient ID:  Mynor Chaidez  [id number]  7 y.o.  1950    Admit Date: 7/26/2017    Discharge Date: 8/4/2017    Admission Diagnoses: perperated viscous; Perforated viscus    Discharge Diagnoses:  Principal Problem:    Perforated viscus (7/26/2017)    Active Problems:    Chronic alcoholism ([facility] [address] (7/26/2017)      MELBA (acute kidney injury) ([facility] (7/26/2017)         Admission Condition: Fair    Discharge Condition: Good    Last Procedure: Procedure(s):  LAPAROTOMY EXPLORATORY, REPAIR OF PERFORATED GASTRIC ULCER AND [place] [facility] Course:   Normal hospital course for this procedure. Consults: Hospitalist    Disposition: home with home health    Patient Instructions:   Current Discharge Medication List      START taking these medications    Details   amoxicillin (AMOXIL) 500 mg capsule Take 2 Caps by mouth every twelve (12) hours for 14 days. Qty: 56 Cap, Refills: 0      clarithromycin (BIAXIN) 500 mg tablet Take 1 Tab by mouth every twelve (12) hours for 14 days. Qty: 28 Tab, Refills: 0      HYDROcodone-acetaminophen (NORCO) 5-325 mg per tablet Take 1-2 Tabs by mouth every four (4) hours as needed. Max Daily Amount: 12 Tabs. Qty: 30 Tab, Refills: 0      pantoprazole (PROTONIX) 40 mg tablet Take 1 Tab by mouth two (2) times a day. Qty: 60 Tab, Refills: 1         CONTINUE these medications which have NOT CHANGED    Details   lisinopril-hydroCHLOROthiazide (PRINZIDE, ZESTORETIC) 20-12.5 mg per tablet Take 1 Tab by mouth daily. loperamide (IMODIUM) 1 mg/5 mL solution Take 1 tsp by mouth daily. carboxymethylcellulose sodium (THERATEARS) 0.25 % dpet Apply 1-2 Drops to eye as needed (Dry eye). naproxen sodium (ALEVE) 220 mg tablet Take 440 mg by mouth two (2) times daily as needed for Pain.      multivitamin (ONE A DAY) tablet Take 2 Tabs by mouth daily. nicotine (NICODERM CQ) 14 mg/24 hr patch 1 Patch by TransDERmal route five (5) days a week.  Monday through Friday as the patient is not allowed to smoke at work      dicyclomine (BENTYL) 20 mg tablet Take 1 Tab by mouth every six (6) hours as needed (abdominal cramps). Qty: 20 Tab, Refills: 0         STOP taking these medications       raNITIdine (ZANTAC) 150 mg tablet Comments:   Reason for Stopping:         acetaminophen-codeine (TYLENOL-CODEINE #3) 300-30 mg per tablet Comments:   Reason for Stopping:             Activity: No lifting, Driving, or Strenuous exercise for 4 weeks  Diet: Low fat, Low cholesterol  Wound Care: As directed    Follow-up with Dr Stiven Wise in 7 days.       Signed:  Tracee Grey MD  [facility]  8/4/2017  11:53 AM

## 2017-08-04 NOTE — PROGRESS NOTES
*CM completed consult*. CM completed room assessment with pt. Pt aware that she will be retuning home with Home Health, and follow appointment with Dr. Socrates Gavlez. Pt will be transported,via family. Pt aware of 2nd IM Medicare letter (signed) placed in chart. Pt aware FOC document (signed) placed in chart. Pt aware that her nurse will reeview d/c plans. Care Management Interventions  PCP Verified by CM: Yes  Mode of Transport at Discharge:  Other (see comment)  Transition of Care Consult (CM Consult): Discharge Planning, [address]: Yes  Discharge Durable Medical Equipment: No  Physical Therapy Consult: Yes  Occupational Therapy Consult: Yes  Speech Therapy Consult: No  Current Support Network: Lives with Spouse, Own Home  Confirm Follow Up Transport: Family  Plan discussed with Pt/Family/Caregiver: Yes  Freedom of Choice Offered: Yes  Discharge Location  Discharge Placement: Home with home health    MIGUEL Bosch   [phone]

## 2017-08-04 NOTE — PROGRESS NOTES
Physical Therapy Goals  Initiated 8/4/2017  1. Patient will move from supine to sit and sit to supine , scoot up and down and roll side to side in bed with independence within 7 day(s). 2.  Patient will transfer from bed to chair and chair to bed with modified independence using the least restrictive device within 7 day(s). 3.  Patient will perform sit to stand with independence within 7 day(s). 4.  Patient will ambulate with modified independence for 250 feet with the least restrictive device within 7 day(s). 5.  Patient will ascend/descend 4 stairs with 2 handrail(s) with modified independence within 7 day(s). Physical Therapy Goals  Initiated 7/28/2017  1. Patient will move from supine to sit and sit to supine , scoot up and down and roll side to side in bed with supervision/set-up within 7 day(s). 2.  Patient will transfer from bed to chair and chair to bed with supervision/set-up using the least restrictive device within 7 day(s). 3.  Patient will perform sit to stand with supervision/set-up within 7 day(s). 4.  Patient will ambulate with supervision/set-up for 150 feet with the least restrictive device within 7 day(s). 5.  Patient will ascend/descend 4 stairs with dual handrail(s) with minimal assistance/contact guard assist within 7 day(s). physical Therapy TREATMENT: WEEKLY REASSESSMENT  Patient: Marisol Ludwig (84 y.o. female)  Date: 8/4/2017  Diagnosis: perperated viscous  Perforated viscus Perforated viscus  Procedure(s) (LRB):  LAPAROTOMY EXPLORATORY, REPAIR OF PERFORATED GASTRIC ULCER AND GRAM PATCH (Bilateral) 9 Days Post-Op  Precautions:  falls    ASSESSMENT: Patient demonstrating slow steady progress in therapy over the week. She requires only supervision for transfers and ambulation. Gait is slow but steady with L AFO donned. Patient able to ambulate 225 feet in halls today. Patient safe on stairs using B railing with CGA.   Recommend HHPT with 24 hour supervision following discharge. Encouraged patient to be up ambulating in halls with nursing staff over weekend. Patient's progression toward goals since last assessment: slow steady progress with therapy. Goals updated     PLAN:  Goals have been updated based on progression since last assessment. Patient continues to benefit from skilled intervention to address the above impairments. Continue to follow the patient 4 times a week to address goals. Planned Interventions:  [x]              Bed Mobility Training             []       Neuromuscular Re-Education  [x]              Transfer Training                   []       Orthotic/Prosthetic Training  [x]              Gait Training                         []       Modalities  []              Therapeutic Exercises           []       Edema Management/Control  [x]              Therapeutic Activities            [x]       Patient and Family Training/Education  [x]              Other (comment):stairs  Discharge Recommendations: Home Health  Further Equipment Recommendations for Discharge: has RW     SUBJECTIVE:   Patient stated I'm feeling okay.     OBJECTIVE DATA SUMMARY:   Critical Behavior:  Neurologic State: Alert  Orientation Level: Appropriate for age, Oriented to person, Oriented to place  Cognition: Appropriate decision making, Appropriate for age attention/concentration, Appropriate safety awareness, Follows commands  Safety/Judgement: Awareness of environment    Strength:   Strength: Generally decreased, functional                      Functional Mobility Training:  Bed Mobility:      deferred; upon arrival patient sitting in bedside chair              Transfers:  Sit to Stand: Supervision  Stand to Sit: Supervision        Bed to Chair: Contact guard assistance                    Balance:     Ambulation/Gait Training:  Distance (ft): 225 Feet (ft)  Assistive Device: Walker, rolling  Ambulation - Level of Assistance: Supervision        Gait Abnormalities: Decreased step clearance        Base of Support: Widened     Speed/Rita: Pace decreased (<100 feet/min); Slow  Step Length: Left shortened;Right shortened      Gait is slow but steady with no LOB noted        Stairs:  Number of Stairs Trained: 4  Stairs - Level of Assistance: Contact guard assistance  Rail Use: Both        Activity Tolerance:   O2 sats=94% on RA and RY=416 post activity  Please refer to the flowsheet for vital signs taken during this treatment.   After treatment:   [x]  Patient left in no apparent distress sitting up in chair  []  Patient left in no apparent distress in bed  [x]  Call bell left within reach  [x]  Nursing notified  []  Caregiver present  [x]  Bed alarm activated    COMMUNICATION/COLLABORATION:   The patients plan of care was discussed with: Registered Nurse and     Thomes Ahumada, PT   Time Calculation: 18 mins

## 2017-08-07 ENCOUNTER — HOME CARE VISIT (OUTPATIENT)
Dept: SCHEDULING | Facility: HOME HEALTH | Age: 67
End: 2017-08-07
Payer: COMMERCIAL

## 2017-08-07 VITALS
RESPIRATION RATE: 16 BRPM | HEART RATE: 95 BPM | SYSTOLIC BLOOD PRESSURE: 120 MMHG | DIASTOLIC BLOOD PRESSURE: 70 MMHG | TEMPERATURE: 98 F | OXYGEN SATURATION: 94 %

## 2017-08-07 PROCEDURE — G0151 HHCP-SERV OF PT,EA 15 MIN: HCPCS

## 2017-08-07 PROCEDURE — 400013 HH SOC

## 2017-08-09 ENCOUNTER — HOME CARE VISIT (OUTPATIENT)
Dept: SCHEDULING | Facility: HOME HEALTH | Age: 67
End: 2017-08-09
Payer: COMMERCIAL

## 2017-08-09 VITALS — SYSTOLIC BLOOD PRESSURE: 118 MMHG | RESPIRATION RATE: 16 BRPM | DIASTOLIC BLOOD PRESSURE: 70 MMHG

## 2017-08-09 PROCEDURE — G0151 HHCP-SERV OF PT,EA 15 MIN: HCPCS

## 2017-08-10 ENCOUNTER — HOME CARE VISIT (OUTPATIENT)
Dept: SCHEDULING | Facility: HOME HEALTH | Age: 67
End: 2017-08-10
Payer: COMMERCIAL

## 2017-08-10 PROCEDURE — G0152 HHCP-SERV OF OT,EA 15 MIN: HCPCS

## 2017-08-11 ENCOUNTER — OFFICE VISIT (OUTPATIENT)
Dept: SURGERY | Age: 67
End: 2017-08-11

## 2017-08-11 VITALS
WEIGHT: 161 LBS | TEMPERATURE: 99.1 F | SYSTOLIC BLOOD PRESSURE: 107 MMHG | HEART RATE: 92 BPM | OXYGEN SATURATION: 96 % | DIASTOLIC BLOOD PRESSURE: 67 MMHG | RESPIRATION RATE: 18 BRPM | BODY MASS INDEX: 28.52 KG/M2

## 2017-08-11 VITALS
HEART RATE: 90 BPM | DIASTOLIC BLOOD PRESSURE: 65 MMHG | TEMPERATURE: 98.7 F | OXYGEN SATURATION: 94 % | RESPIRATION RATE: 16 BRPM | SYSTOLIC BLOOD PRESSURE: 115 MMHG

## 2017-08-11 DIAGNOSIS — R19.8 PERFORATED VISCUS: Primary | ICD-10-CM

## 2017-08-11 NOTE — MR AVS SNAPSHOT
Visit Information Date & Time Provider Department Dept. Phone Encounter #  
 8/11/2017  9:50 AM Migdalia Aquino MD [facility] [phone] [id number] Upcoming Health Maintenance Date Due Hepatitis C Screening 1950 DTaP/Tdap/Td series (1 - Tdap) 8/27/1970 BREAST CANCER SCRN MAMMOGRAM 8/27/1999 FOBT Q 1 YEAR AGE 50-75 8/27/1999 ZOSTER VACCINE AGE 60> 6/27/2009 GLAUCOMA SCREENING Q2Y 8/27/2014 OSTEOPOROSIS SCREENING (DEXA) 8/27/2014 Pneumococcal 65+ Low/Medium Risk (1 of 2 - PCV13) 8/27/2014 MEDICARE YEARLY EXAM 8/27/2014 INFLUENZA AGE 9 TO ADULT 8/1/2017 Allergies as of 8/11/2017  Review Complete On: 8/11/2017 By: Migdalia Aquino MD  
  
 Severity Noted Reaction Type Reactions Scallops  07/25/2017    Hives Current Immunizations  Never Reviewed No immunizations on file. Not reviewed this visit You Were Diagnosed With   
  
 Codes Comments Perforated viscus    -  Primary ICD-10-CM: R19.8 ICD-9-CM: 799.89 Vitals BP Pulse Temp Resp Weight(growth percentile) SpO2  
 107/67 (BP 1 Location: Right arm, BP Patient Position: Sitting) 92 99.1 °F (37.3 °C) (Oral) 18 161 lb (73 kg) 96% BMI OB Status Smoking Status 28.52 kg/m2 Menopause Current Every Day Smoker BMI and BSA Data Body Mass Index Body Surface Area 28.52 kg/m 2 1.8 m 2 Your Updated Medication List  
  
   
This list is accurate as of: 8/11/17  1:25 PM.  Always use your most recent med list.  
  
  
  
  
 ALEVE 220 mg tablet Generic drug:  naproxen sodium Take 440 mg by mouth two (2) times daily as needed for Pain.  
  
 amoxicillin 500 mg capsule Commonly known as:  AMOXIL Take 2 Caps by mouth every twelve (12) hours for 14 days. clarithromycin 500 mg tablet Commonly known as:  Rollene Moca Take 1 Tab by mouth every twelve (12) hours for 14 days. dicyclomine 20 mg tablet Commonly known as:  BENTYL Take 1 Tab by mouth every six (6) hours as needed (abdominal cramps). HYDROcodone-acetaminophen 5-325 mg per tablet Commonly known as:  Nae Humera Take 1-2 Tabs by mouth every four (4) hours as needed. Max Daily Amount: 12 Tabs. lisinopril-hydroCHLOROthiazide 20-12.5 mg per tablet Commonly known as:  Leonetta Seeds Take 1 Tab by mouth daily. loperamide 1 mg/5 mL solution Commonly known as:  IMODIUM Take 1 tsp by mouth daily. multivitamin tablet Commonly known as:  ONE A DAY Take 2 Tabs by mouth daily. nicotine 14 mg/24 hr patch Commonly known as:  NICODERM CQ  
1 Patch by TransDERmal route five (5) days a week. Monday through Friday as the patient is not allowed to smoke at work  
  
 pantoprazole 40 mg tablet Commonly known as:  PROTONIX Take 1 Tab by mouth two (2) times a day. THERATEARS 0.25 % Dpet Generic drug:  carboxymethylcellulose sodium Apply 1-2 Drops to eye as needed (Dry eye). To-Do List   
 08/14/2017 11:30 AM  
  Appointment with Fili Tyler PT at [place] [address]  
  
 08/15/2017 To Be Determined Appointment with Farida Otto OT at [place] [address]  
  
 08/16/2017 9:30 AM  
  Appointment with Fili Tyler PT at [place] [address]  
  
 08/17/2017 To Be Determined Appointment with Farida Otto OT at [place] [address]  
  
 08/21/2017 To Be Determined Appointment with Fili Tyler PT at [place] [address]  
  
 08/23/2017 To Be Determined Appointment with Fili Tyler PT at [place] [address] Introducing [facility] & HEALTH SERVICES! Germaine Carr introduces [facility] patient portal. Now you can access parts of your medical record, email your doctor's office, and request medication refills online.    
 
1. In your internet browser, go to [email or web address] 2. Click on the First Time User? Click Here link in the Sign In box. You will see the New Member Sign Up page. 3. Enter your myhub Access Code exactly as it appears below. You will not need to use this code after youve completed the sign-up process. If you do not sign up before the expiration date, you must request a new code. · myhub Access Code: [email or web address] Expires: 10/23/2017  7:59 PM 
 
4. Enter the last four digits of your Social Security Number (xxxx) and Date of Birth (mm/dd/yyyy) as indicated and click Submit. You will be taken to the next sign-up page. 5. Create a [employer] ID. This will be your myhub login ID and cannot be changed, so think of one that is secure and easy to remember. 6. Create a myhub password. You can change your password at any time. 7. Enter your Password Reset Question and Answer. This can be used at a later time if you forget your password. 8. Enter your e-mail address. You will receive e-mail notification when new information is available in [address] 9. Click Sign Up. You can now view and download portions of your medical record. 10. Click the Download Summary menu link to download a portable copy of your medical information. If you have questions, please visit the Frequently Asked Questions section of the myhub website. Remember, myhub is NOT to be used for urgent needs. For medical emergencies, dial 911. Now available from your iPhone and Android! Please provide this summary of care documentation to your next provider. If you have any questions after today's visit, please call [phone].

## 2017-08-11 NOTE — PROGRESS NOTES
SUBJECTIVE: Jimmy Li is a 78 y.o. female is seen for a routine postop check s/p repair of perforated gastric ulcer. Reports no problems with the wound or other issues. Activity, diet and bowels are normal. No pain. OBJECTIVE: Appears well. Wounds are well healed without complications or infection. ASSESSMENT: normal postoperative course, doing well. PLAN: Patient is instructed to avoid heavy lifting for 4 more weeks. Return PRN for any problems or concerns. F/u with GI for EGD to confirm healing and exclude malignancy.

## 2017-08-14 ENCOUNTER — HOME CARE VISIT (OUTPATIENT)
Dept: SCHEDULING | Facility: HOME HEALTH | Age: 67
End: 2017-08-14
Payer: COMMERCIAL

## 2017-08-14 VITALS
HEART RATE: 86 BPM | DIASTOLIC BLOOD PRESSURE: 60 MMHG | RESPIRATION RATE: 16 BRPM | OXYGEN SATURATION: 95 % | SYSTOLIC BLOOD PRESSURE: 110 MMHG

## 2017-08-14 VITALS
DIASTOLIC BLOOD PRESSURE: 65 MMHG | OXYGEN SATURATION: 97 % | RESPIRATION RATE: 16 BRPM | SYSTOLIC BLOOD PRESSURE: 112 MMHG | HEART RATE: 96 BPM

## 2017-08-14 PROCEDURE — G0152 HHCP-SERV OF OT,EA 15 MIN: HCPCS

## 2017-08-14 PROCEDURE — G0151 HHCP-SERV OF PT,EA 15 MIN: HCPCS

## 2017-08-16 ENCOUNTER — HOME CARE VISIT (OUTPATIENT)
Dept: SCHEDULING | Facility: HOME HEALTH | Age: 67
End: 2017-08-16
Payer: COMMERCIAL

## 2017-08-16 VITALS — SYSTOLIC BLOOD PRESSURE: 112 MMHG | RESPIRATION RATE: 16 BRPM | DIASTOLIC BLOOD PRESSURE: 70 MMHG

## 2017-08-16 PROCEDURE — G0151 HHCP-SERV OF PT,EA 15 MIN: HCPCS

## 2017-08-17 ENCOUNTER — HOME CARE VISIT (OUTPATIENT)
Dept: SCHEDULING | Facility: HOME HEALTH | Age: 67
End: 2017-08-17
Payer: COMMERCIAL

## 2017-08-17 VITALS
HEART RATE: 78 BPM | OXYGEN SATURATION: 96 % | RESPIRATION RATE: 16 BRPM | SYSTOLIC BLOOD PRESSURE: 120 MMHG | DIASTOLIC BLOOD PRESSURE: 70 MMHG | TEMPERATURE: 98.5 F

## 2017-08-17 PROCEDURE — G0152 HHCP-SERV OF OT,EA 15 MIN: HCPCS

## 2017-08-21 ENCOUNTER — HOME CARE VISIT (OUTPATIENT)
Dept: SCHEDULING | Facility: HOME HEALTH | Age: 67
End: 2017-08-21
Payer: COMMERCIAL

## 2017-08-21 VITALS — DIASTOLIC BLOOD PRESSURE: 70 MMHG | RESPIRATION RATE: 16 BRPM | SYSTOLIC BLOOD PRESSURE: 118 MMHG

## 2017-08-21 PROCEDURE — G0151 HHCP-SERV OF PT,EA 15 MIN: HCPCS

## 2017-08-22 ENCOUNTER — DOCUMENTATION ONLY (OUTPATIENT)
Dept: SURGERY | Age: 67
End: 2017-08-22

## 2017-08-22 NOTE — PROGRESS NOTES
Received a message from patient stating she did not receive form in mail. It was sent on 14th and patient hadnt received. I sent another copy to her home address today 8/22/17. Called patient  She understood.

## 2017-08-23 ENCOUNTER — HOME CARE VISIT (OUTPATIENT)
Dept: SCHEDULING | Facility: HOME HEALTH | Age: 67
End: 2017-08-23
Payer: COMMERCIAL

## 2017-08-23 VITALS — SYSTOLIC BLOOD PRESSURE: 122 MMHG | DIASTOLIC BLOOD PRESSURE: 70 MMHG | RESPIRATION RATE: 16 BRPM

## 2017-08-23 PROCEDURE — G0151 HHCP-SERV OF PT,EA 15 MIN: HCPCS

## 2017-11-09 RX ORDER — PSEUDOEPHED/ACETAMINOPHEN/CPM 30-500-2MG
TABLET ORAL
COMMUNITY
End: 2022-06-21

## 2017-11-13 ENCOUNTER — HOSPITAL ENCOUNTER (OUTPATIENT)
Age: 67
Setting detail: OUTPATIENT SURGERY
Discharge: HOME | End: 2017-11-13
Attending: INTERNAL MEDICINE | Admitting: INTERNAL MEDICINE
Payer: COMMERCIAL

## 2017-11-13 ENCOUNTER — ANESTHESIA (OUTPATIENT)
Dept: ENDOSCOPY | Age: 67
End: 2017-11-13
Payer: COMMERCIAL

## 2017-11-13 ENCOUNTER — ANESTHESIA EVENT (OUTPATIENT)
Dept: ENDOSCOPY | Age: 67
End: 2017-11-13
Payer: COMMERCIAL

## 2017-11-13 VITALS
WEIGHT: 150 LBS | OXYGEN SATURATION: 100 % | RESPIRATION RATE: 16 BRPM | HEART RATE: 62 BPM | SYSTOLIC BLOOD PRESSURE: 110 MMHG | HEIGHT: 63 IN | TEMPERATURE: 98 F | BODY MASS INDEX: 26.58 KG/M2 | DIASTOLIC BLOOD PRESSURE: 58 MMHG

## 2017-11-13 PROCEDURE — 77030027957 HC TBNG IRR ENDOGTR BUSS -B: Performed by: INTERNAL MEDICINE

## 2017-11-13 PROCEDURE — 88305 TISSUE EXAM BY PATHOLOGIST: CPT | Performed by: INTERNAL MEDICINE

## 2017-11-13 PROCEDURE — 74011250636 HC RX REV CODE- 250/636

## 2017-11-13 PROCEDURE — 76060000032 HC ANESTHESIA 0.5 TO 1 HR: Performed by: INTERNAL MEDICINE

## 2017-11-13 PROCEDURE — 77030013992 HC SNR POLYP ENDOSC BSC -B: Performed by: INTERNAL MEDICINE

## 2017-11-13 PROCEDURE — 76040000007: Performed by: INTERNAL MEDICINE

## 2017-11-13 RX ORDER — PROPOFOL 10 MG/ML
INJECTION, EMULSION INTRAVENOUS AS NEEDED
Status: DISCONTINUED | OUTPATIENT
Start: 2017-11-13 | End: 2017-11-13 | Stop reason: HOSPADM

## 2017-11-13 RX ORDER — FENTANYL CITRATE 50 UG/ML
100 INJECTION, SOLUTION INTRAMUSCULAR; INTRAVENOUS
Status: DISCONTINUED | OUTPATIENT
Start: 2017-11-13 | End: 2017-11-13 | Stop reason: HOSPADM

## 2017-11-13 RX ORDER — ATROPINE SULFATE 0.1 MG/ML
0.5 INJECTION INTRAVENOUS
Status: DISCONTINUED | OUTPATIENT
Start: 2017-11-13 | End: 2017-11-13 | Stop reason: HOSPADM

## 2017-11-13 RX ORDER — SODIUM CHLORIDE 9 MG/ML
50 INJECTION, SOLUTION INTRAVENOUS CONTINUOUS
Status: DISCONTINUED | OUTPATIENT
Start: 2017-11-13 | End: 2017-11-13 | Stop reason: HOSPADM

## 2017-11-13 RX ORDER — SODIUM CHLORIDE 0.9 % (FLUSH) 0.9 %
5-10 SYRINGE (ML) INJECTION EVERY 8 HOURS
Status: DISCONTINUED | OUTPATIENT
Start: 2017-11-13 | End: 2017-11-13 | Stop reason: HOSPADM

## 2017-11-13 RX ORDER — SODIUM CHLORIDE 0.9 % (FLUSH) 0.9 %
5-10 SYRINGE (ML) INJECTION AS NEEDED
Status: DISCONTINUED | OUTPATIENT
Start: 2017-11-13 | End: 2017-11-13 | Stop reason: HOSPADM

## 2017-11-13 RX ORDER — MIDAZOLAM HYDROCHLORIDE 1 MG/ML
.25-1 INJECTION, SOLUTION INTRAMUSCULAR; INTRAVENOUS
Status: DISCONTINUED | OUTPATIENT
Start: 2017-11-13 | End: 2017-11-13 | Stop reason: HOSPADM

## 2017-11-13 RX ORDER — SODIUM CHLORIDE 9 MG/ML
INJECTION, SOLUTION INTRAVENOUS
Status: DISCONTINUED | OUTPATIENT
Start: 2017-11-13 | End: 2017-11-13 | Stop reason: HOSPADM

## 2017-11-13 RX ORDER — NALOXONE HYDROCHLORIDE 0.4 MG/ML
0.4 INJECTION, SOLUTION INTRAMUSCULAR; INTRAVENOUS; SUBCUTANEOUS
Status: DISCONTINUED | OUTPATIENT
Start: 2017-11-13 | End: 2017-11-13 | Stop reason: HOSPADM

## 2017-11-13 RX ORDER — DEXTROMETHORPHAN/PSEUDOEPHED 2.5-7.5/.8
1.2 DROPS ORAL
Status: DISCONTINUED | OUTPATIENT
Start: 2017-11-13 | End: 2017-11-13 | Stop reason: HOSPADM

## 2017-11-13 RX ORDER — FLUMAZENIL 0.1 MG/ML
0.2 INJECTION INTRAVENOUS
Status: DISCONTINUED | OUTPATIENT
Start: 2017-11-13 | End: 2017-11-13 | Stop reason: HOSPADM

## 2017-11-13 RX ORDER — EPINEPHRINE 0.1 MG/ML
1 INJECTION INTRACARDIAC; INTRAVENOUS
Status: DISCONTINUED | OUTPATIENT
Start: 2017-11-13 | End: 2017-11-13 | Stop reason: HOSPADM

## 2017-11-13 RX ADMIN — PROPOFOL 20 MG: 10 INJECTION, EMULSION INTRAVENOUS at 15:30

## 2017-11-13 RX ADMIN — PROPOFOL 20 MG: 10 INJECTION, EMULSION INTRAVENOUS at 15:32

## 2017-11-13 RX ADMIN — PROPOFOL 30 MG: 10 INJECTION, EMULSION INTRAVENOUS at 15:24

## 2017-11-13 RX ADMIN — PROPOFOL 50 MG: 10 INJECTION, EMULSION INTRAVENOUS at 15:11

## 2017-11-13 RX ADMIN — PROPOFOL 20 MG: 10 INJECTION, EMULSION INTRAVENOUS at 15:36

## 2017-11-13 RX ADMIN — PROPOFOL 40 MG: 10 INJECTION, EMULSION INTRAVENOUS at 15:20

## 2017-11-13 RX ADMIN — PROPOFOL 20 MG: 10 INJECTION, EMULSION INTRAVENOUS at 15:34

## 2017-11-13 RX ADMIN — PROPOFOL 30 MG: 10 INJECTION, EMULSION INTRAVENOUS at 15:13

## 2017-11-13 RX ADMIN — PROPOFOL 120 MG: 10 INJECTION, EMULSION INTRAVENOUS at 15:09

## 2017-11-13 RX ADMIN — PROPOFOL 30 MG: 10 INJECTION, EMULSION INTRAVENOUS at 15:42

## 2017-11-13 RX ADMIN — PROPOFOL 30 MG: 10 INJECTION, EMULSION INTRAVENOUS at 15:28

## 2017-11-13 RX ADMIN — PROPOFOL 30 MG: 10 INJECTION, EMULSION INTRAVENOUS at 15:22

## 2017-11-13 RX ADMIN — PROPOFOL 20 MG: 10 INJECTION, EMULSION INTRAVENOUS at 15:40

## 2017-11-13 RX ADMIN — PROPOFOL 40 MG: 10 INJECTION, EMULSION INTRAVENOUS at 15:18

## 2017-11-13 RX ADMIN — PROPOFOL 50 MG: 10 INJECTION, EMULSION INTRAVENOUS at 15:16

## 2017-11-13 RX ADMIN — PROPOFOL 30 MG: 10 INJECTION, EMULSION INTRAVENOUS at 15:26

## 2017-11-13 RX ADMIN — SODIUM CHLORIDE: 9 INJECTION, SOLUTION INTRAVENOUS at 14:51

## 2017-11-13 RX ADMIN — PROPOFOL 20 MG: 10 INJECTION, EMULSION INTRAVENOUS at 15:38

## 2017-11-13 NOTE — PERIOP NOTES

## 2017-11-13 NOTE — ANESTHESIA PREPROCEDURE EVALUATION
Anesthetic History   No history of anesthetic complications            Review of Systems / Medical History  Patient summary reviewed, nursing notes reviewed and pertinent labs reviewed    Pulmonary          Smoker         Neuro/Psych   Within defined limits           Cardiovascular    Hypertension              Exercise tolerance: >4 METS     GI/Hepatic/Renal           PUD    Comments: History perf gastric ulcer Endo/Other        Arthritis     Other Findings            Physical Exam    Airway  Mallampati: III  TM Distance: 4 - 6 cm  Neck ROM: normal range of motion   Mouth opening: Normal     Cardiovascular    Rhythm: regular  Rate: normal         Dental  No notable dental hx       Pulmonary                 Abdominal         Other Findings            Anesthetic Plan    ASA: 3  Anesthesia type: MAC          Induction: Intravenous  Anesthetic plan and risks discussed with: Patient

## 2017-11-13 NOTE — DISCHARGE INSTRUCTIONS
[address] [facility]  [address] [facility]  [id number]  1950    DISCOMFORT:  Redness at IV site- apply warm compress to area; if redness or soreness persist- contact your physician  There may be a slight amount of blood passed from the rectum  Gaseous discomfort- walking, belching will help relieve any discomfort  You may not operate a vehicle for 12 hours  You may not  engage in an occupation involving machinery or appliances for rest of today  You may not  drink alcoholic beverages for at least 12 hours  Avoid making any critical decisions for at least 24 hour    DIET:   High fiber diet. - however -  remember your colon is empty and a heavy meal will produce gas. Avoid these foods:  vegetables, fried / greasy foods, carbonated drinks for today      ACTIVITY  You may resume your normal daily activities   Spend the remainder of the day resting -  avoid any strenuous activity. CALL M.D. ANY SIGN OF   Increasing pain, nausea, vomiting  Abdominal distension (swelling)  New increased bleeding (oral or rectal)  Fever (chills)  Pain in chest area  Bloody discharge from nose or mouth  Shortness of breath      You may not  take any Advil, Aspirin, Ibuprofen, Motrin, Aleve, or Goodys for 7 days, ONLY  Tylenol as needed for pain. Post procedure diagnosis:  Gastritis  Diverticulosis  recro- sigmoid polyps  internal Hemorrhoids    Follow-up Instructions:   Call Dr. Trang Little for any questions or problems. If we took a biopsy please call the office within 2 weeks to discuss your                             pathology results.  Telephone # [phone]

## 2017-11-13 NOTE — ANESTHESIA POSTPROCEDURE EVALUATION
Post-Anesthesia Evaluation and Assessment    Patient: Camila Lopez MRN: [id number]  SSN: [id number]    YOB: 1950  Age: 75 y.o. Sex: female       Cardiovascular Function/Vital Signs  Visit Vitals    /58    Pulse 62    Temp 36.7 °C (98 °F)    Resp 16    Ht 5' 3\" (1.6 m)    Wt 68 kg (150 lb)    SpO2 100%    BMI 26.57 kg/m2       Patient is status post MAC anesthesia for Procedure(s):  COLONOSCOPY / EGD   ESOPHAGOGASTRODUODENOSCOPY (EGD)  ENDOSCOPIC POLYPECTOMY. Nausea/Vomiting: None    Postoperative hydration reviewed and adequate. Pain:  Pain Scale 1: Numeric (0 - 10) (11/13/17 1444)  Pain Intensity 1: 0 (11/13/17 1444)   Managed    Neurological Status: At baseline    Mental Status and Level of Consciousness: Arousable    Pulmonary Status:   O2 Device: CO2 nasal cannula (11/13/17 2645)   Adequate oxygenation and airway patent    Complications related to anesthesia: None    Post-anesthesia assessment completed.  No concerns    Signed By: Mallika Rene MD     November 13, 2017

## 2017-11-13 NOTE — ROUTINE PROCESS
[facility]  1950  [id number]    Situation:  Verbal report received from: Jean Haider  Procedure: Procedure(s):  COLONOSCOPY / EGD   ESOPHAGOGASTRODUODENOSCOPY (EGD)  ENDOSCOPIC POLYPECTOMY    Background:    Preoperative diagnosis: GASTRIC ULCER W/ PERFORATION  HELICOBACTER PYLORI GASTROINTESTINAL TRACT INFECTION  SCREENING (FOR COLONIC NEOPLASIA)  Postoperative diagnosis: Gastritis  Diverticulosis  recro- sigmoid polyps  internal Hemorrhoids    :  Dr. Bety Mahan  Assistant(s): Endoscopy Technician-1: Hang Moreno  Endoscopy RN-1: Leslie Leblanc RN    Specimens:   ID Type Source Tests Collected by Time Destination   1 : polyps  Preservative Sigmoid  Niraj Frankel MD 11/13/2017 1527 Pathology   2 : polyps Preservative Colon, Recto-sigmoid  Niraj Frankel MD 11/13/2017 1536 Pathology     H. Pylori  no    Assessment:  Intra-procedure medications     Anesthesia gave intra-procedure sedation and medications, see anesthesia flow sheet yes    Intravenous fluids: NS@ KVO     Vital signs stable     Abdominal assessment: round and soft     Recommendation:  Discharge patient per MD order.     Family or Friend   Permission to share finding with family or friend yes

## 2017-11-13 NOTE — H&P
[address]  [address] [place], [address]  [phone]                                History and Physical     NAME: Emilee Schroeder   :  1950   MRN:  [id number]     HPI:  The patient was seen and examined.     Past Surgical History:   Procedure Laterality Date    ABDOMEN SURGERY PROC UNLISTED      surgery for ulcer    HX BACK SURGERY      x3 - 3 were disc surgeries with the last being afusion     HX BREAST BIOPSY Bilateral     benign    HX CATARACT REMOVAL Left     HX HYSTERECTOMY      HX TONSILLECTOMY       Past Medical History:   Diagnosis Date    Alcohol abuse     Arthritis     fingers/neck    Foot drop, left     Gastric ulcer     Hypertension     Stool color black      Social History   Substance Use Topics    Smoking status: Current Every Day Smoker     Packs/day: 0.50     Types: Cigarettes    Smokeless tobacco: Never Used    Alcohol use 17.5 oz/week     35 Shots of liquor per week      Comment: None since 2017     Allergies   Allergen Reactions    Scallops Hives     Family History   Problem Relation Age of Onset    Other Mother      committed suicide    Heart Attack Father     Cancer Paternal Aunt      lung    Diabetes Maternal Grandfather     Heart Disease Paternal Grandmother      angina    Heart Attack Other      Current Facility-Administered Medications   Medication Dose Route Frequency    0.9% sodium chloride infusion  50 mL/hr IntraVENous CONTINUOUS    sodium chloride (NS) flush 5-10 mL  5-10 mL IntraVENous Q8H    sodium chloride (NS) flush 5-10 mL  5-10 mL IntraVENous PRN    midazolam (VERSED) injection 0.25-10 mg  0.25-10 mg IntraVENous Multiple    fentaNYL citrate (PF) injection 100 mcg  100 mcg IntraVENous MULTIPLE DOSE GIVEN    naloxone (NARCAN) injection 0.4 mg  0.4 mg IntraVENous Multiple    flumazenil (ROMAZICON) 0.1 mg/mL injection 0.2 mg  0.2 mg IntraVENous Multiple    simethicone (MYLICON) 18IA/0.0GV oral drops 80 mg  1.2 mL Oral Multiple    atropine injection 0.5 mg  0.5 mg IntraVENous ONCE PRN    EPINEPHrine (ADRENALIN) 0.1 mg/mL syringe 1 mg  1 mg Endoscopically ONCE PRN     Facility-Administered Medications Ordered in Other Encounters   Medication Dose Route Frequency    0.9% sodium chloride infusion   IntraVENous CONTINUOUS         PHYSICAL EXAM:  General: WD, WN. Alert, cooperative, no acute distress    HEENT: NC, Atraumatic. PERRLA, EOMI. Anicteric sclerae. Lungs:  CTA Bilaterally. No Wheezing/Rhonchi/Rales. Heart:  Regular  rhythm,  No murmur, No Rubs, No Gallops  Abdomen: Soft, Non distended, Non tender.  +Bowel sounds, no HSM  Extremities: No c/c/e  Neurologic:  CN 2-12 gi, Alert and oriented X 3. No acute neurological distress   Psych:   Good insight. Not anxious nor agitated. The heart, lungs and mental status were satisfactory for the administration of MAC sedation and for the procedure.       Mallampati score: 3       Assessment:   · Gastric ulcer perforation  · Screening colonoscopy    Plan:   · Endoscopic procedure  · MAC sedation   ·

## 2017-11-13 NOTE — IP AVS SNAPSHOT
[address] 
[phone] Patient: Eugene Rawls MRN: [id number] ZVX:[id number]/79/8088 About your hospitalization You were admitted on:  November 13, 2017 You last received care in the:  [facility] ENDOSCOPY You were discharged on:  November 13, 2017 Why you were hospitalized Your primary diagnosis was:  Not on File Discharge Orders None A check macy indicates which time of day the medication should be taken. My Medications TAKE these medications as instructed Instructions Each Dose to Equal  
 Morning Noon Evening Bedtime IMODIUM A-D 1 mg/7.5 mL solution Generic drug:  loperamide Your last dose was: Your next dose is: Take  by mouth. 30 ml po every morning. IRON PO Your last dose was: Your next dose is: Take  by mouth. Takes one iron pill po at night. Unsure of strength. lisinopril-hydroCHLOROthiazide 20-12.5 mg per tablet Commonly known as:  Corina Fair Your last dose was: Your next dose is: Take 1 Tab by mouth daily. 1 Tab MULTIVITAMIN PO Your last dose was: Your next dose is: Take  by mouth. Takes one po once daily  
     
   
   
   
  
 nicotine 14 mg/24 hr patch Commonly known as:  Lanceamairani Salgadodon Your last dose was: Your next dose is:    
   
   
 1 Patch by TransDERmal route five (5) days a week. Monday through Friday 1 Patch  
    
   
   
   
  
 pantoprazole 40 mg tablet Commonly known as:  PROTONIX Your last dose was: Your next dose is: Take 1 Tab by mouth two (2) times a day. 40 mg Discharge Instructions [address] 
[address] [facility] [address] [facility], [address] 
[phone] DISCHARGE INSTRUCTIONS Ray Randy 
[id number] 
1950 DISCOMFORT: 
Redness at IV site- apply warm compress to area; if redness or soreness persist- contact your physician There may be a slight amount of blood passed from the rectum Gaseous discomfort- walking, belching will help relieve any discomfort You may not operate a vehicle for 12 hours You may not  engage in an occupation involving machinery or appliances for rest of today You may not  drink alcoholic beverages for at least 12 hours Avoid making any critical decisions for at least 24 hour DIET: 
 High fiber diet.  however -  remember your colon is empty and a heavy meal will produce gas. Avoid these foods:  vegetables, fried / greasy foods, carbonated drinks for today ACTIVITY You may resume your normal daily activities Spend the remainder of the day resting -  avoid any strenuous activity. CALL M.D. ANY SIGN OF Increasing pain, nausea, vomiting Abdominal distension (swelling) New increased bleeding (oral or rectal) Fever (chills) Pain in chest area Bloody discharge from nose or mouth Shortness of breath You may not  take any Advil, Aspirin, Ibuprofen, Motrin, Aleve, or Goodys for 7 days, ONLY  Tylenol as needed for pain. Post procedure diagnosis:  Gastritis Diverticulosis 
recro- sigmoid polyps 
internal Hemorrhoids Follow-up Instructions: 
 Call Dr. Shlomo Echavarria for any questions or problems. If we took a biopsy please call the office within 2 weeks to discuss your                             pathology results. Telephone # [phone] Introducing [facility] & HEALTH SERVICES! Linda Saleh introduces [employer] patient portal. Now you can access parts of your medical record, email your doctor's office, and request medication refills online. 1. In your internet browser, go to [email or web address] 2. Click on the First Time User? Click Here link in the Sign In box. You will see the New Member Sign Up page. 3. Enter your [employer] Access Code exactly as it appears below. You will not need to use this code after youve completed the sign-up process. If you do not sign up before the expiration date, you must request a new code. · [employer] Access Code: [email or web address] Expires: 2/11/2018  3:57 PM 
 
4. Enter the last four digits of your Social Security Number (xxxx) and Date of Birth (mm/dd/yyyy) as indicated and click Submit. You will be taken to the next sign-up page. 5. Create a [employer] ID. This will be your [employer] login ID and cannot be changed, so think of one that is secure and easy to remember. 6. Create a [employer] password. You can change your password at any time. 7. Enter your Password Reset Question and Answer. This can be used at a later time if you forget your password. 8. Enter your e-mail address. You will receive e-mail notification when new information is available in [address] 9. Click Sign Up. You can now view and download portions of your medical record. 10. Click the Download Summary menu link to download a portable copy of your medical information. If you have questions, please visit the Frequently Asked Questions section of the [employer] website. Remember, [employer] is NOT to be used for urgent needs. For medical emergencies, dial 911. Now available from your iPhone and Android! Providers Seen During Your Hospitalization Provider Specialty Primary office phone Margarita Tejada MD Gastroenterology [phone] Your Primary Care Physician (PCP) Primary Care Physician Office Phone Office Fax NONE ** None ** ** None ** You are allergic to the following Allergen Reactions Scallops Hives Recent Documentation Height Weight BMI OB Status Smoking Status 1.6 m 68 kg 26.57 kg/m2 Postmenopausal Current Every Day Smoker Emergency Contacts Name Discharge Info Relation Home Work Mobile Alfonso Chavez DISCHARGE CAREGIVER [3] Spouse [3] [phone] Veronica Huff  Spouse [3] [phone] Patient Belongings The following personal items are in your possession at time of discharge: 
  Dental Appliances: None  Visual Aid: None Please provide this summary of care documentation to your next provider. Signatures-by signing, you are acknowledging that this After Visit Summary has been reviewed with you and you have received a copy. Patient Signature:  ____________________________________________________________ Date:  ____________________________________________________________  
  
[facility] Provider Signature:  ____________________________________________________________ Date:  ____________________________________________________________

## 2017-11-13 NOTE — IP AVS SNAPSHOT
Summary of Care Report The Summary of Care report has been created to help improve care coordination. Users with access to [employer] or [address] (Web-based application) may access additional patient information including the Discharge Summary. If you are not currently a [address] user and need more information, please call the number listed below in the [place] [address] section and ask to be connected with Medical Records. Facility Information Name Address Phone [phone] [place] [id number] [phone] Patient Information Patient Name Sex DELFINA Tejada ([id number]) Female 1950 Discharge Information Admitting Provider Service Area Unit Michelle Lacey MD  [facility] [phone] Discharge Provider Discharge Date/Time Discharge Disposition Destination (none) (none) (none) (none) Patient Language Language ENGLISH [13] Hospital Problems as of 2017  Reviewed: 2017 10:10 AM by Summer Ennis MD  
 None Non-Hospital Problems as of 2017  Reviewed: 2017 10:10 AM by Summer Ennis MD  
  
  
  
 Class Noted - Resolved Last Modified Active Problems Perforated viscus  2017 - Present 2017 by Summer Ennis MD  
  Entered by Summer Ennis MD  
  Chronic alcoholism [facility])  2017 - Present 2017 by Summer Ennis MD  
  Entered by Summer Ennis MD  
  MELBA (acute kidney injury) [facility])  2017 - Present 2017 by Summer Ennis MD  
  Entered by Summer Ennis MD  
  
You are allergic to the following Allergen Reactions Scallops Hives Current Discharge Medication List  
  
CONTINUE these medications which have NOT CHANGED Dose & Instructions Dispensing Information Comments IMODIUM A-D 1 mg/7.5 mL solution Generic drug:  loperamide Take  by mouth. 30 ml po every morning. Refills:  0 IRON PO Take  by mouth. Takes one iron pill po at night. Unsure of strength. Refills:  0  
   
 lisinopril-hydroCHLOROthiazide 20-12.5 mg per tablet Commonly known as:  Corina Zamudioels Dose:  1 Tab Take 1 Tab by mouth daily. Refills:  0 MULTIVITAMIN PO Take  by mouth. Takes one po once daily Refills:  0  
   
 nicotine 14 mg/24 hr patch Commonly known as:  Lance Salgadodon Dose:  1 Patch 1 Patch by TransDERmal route five (5) days a week. Monday through Friday Refills:  0  
   
 pantoprazole 40 mg tablet Commonly known as:  PROTONIX Dose:  40 mg Take 1 Tab by mouth two (2) times a day. Quantity:  60 Tab Refills:  1 Surgery Information ID Date/Time Status Primary Surgeon All Procedures Location [id number] 11/13/2017 1500 Unposted Loki Arreola MD COLONOSCOPY / EGD  
ESOPHAGOGASTRODUODENOSCOPY (EGD) ENDOSCOPIC POLYPECTOMY [facility] ENDOSCOPY Follow-up Information None [facility] Inst[facility] 
[address] [place], [address] 
[phone] DISCHARGE INSTRUCTIONS [place]  
[id number] 
1950 DISCOMFORT: 
Redness at IV site- apply warm compress to area; if redness or soreness persist- contact your physician There may be a slight amount of blood passed from the rectum Gaseous discomfort- walking, belching will help relieve any discomfort You may not operate a vehicle for 12 hours You may not  engage in an occupation involving machinery or appliances for rest of today You may not  drink alcoholic beverages for at least 12 hours Avoid making any critical decisions for at least 24 hour DIET: 
 High fiber diet.  however -  remember your colon is empty and a heavy meal will produce gas. Avoid these foods:  vegetables, fried / greasy foods, carbonated drinks for today ACTIVITY You may resume your normal daily activities Spend the remainder of the day resting -  avoid any strenuous activity. CALL M.D. ANY SIGN OF Increasing pain, nausea, vomiting Abdominal distension (swelling) New increased bleeding (oral or rectal) Fever (chills) Pain in chest area Bloody discharge from nose or mouth Shortness of breath You may not  take any Advil, Aspirin, Ibuprofen, Motrin, Aleve, or Goodys for 7 days, ONLY  Tylenol as needed for pain. Post procedure diagnosis:  Gastritis Diverticulosis 
recro- sigmoid polyps 
internal Hemorrhoids Follow-up Instructions: 
 Call Dr. Bety Mahan for any questions or problems. If we took a biopsy please call the office within 2 weeks to discuss your                             pathology results. Telephone # [phone] Chart Review Routing History No Routing History on File

## 2018-02-16 NOTE — IP AVS SNAPSHOT
Afsaneh in lab advised FIT screen lab ordered by Dr. Sims.    Gilma Rosario CMA   [address] [facility] 
[phone] Patient: Charleen Goel MRN: [id number] VYC:9/78/2033 Current Discharge Medication List  
  
START taking these medications Dose & Instructions Dispensing Information Comments Morning Noon Evening Bedtime  
 amoxicillin 500 mg capsule Commonly known as:  AMOXIL Your last dose was: Your next dose is:    
   
   
 Dose:  1000 mg Take 2 Caps by mouth every twelve (12) hours for 14 days. Quantity:  56 Cap Refills:  0  
     
   
   
   
  
 clarithromycin 500 mg tablet Commonly known as:  Juan M Powers Your last dose was: Your next dose is:    
   
   
 Dose:  500 mg Take 1 Tab by mouth every twelve (12) hours for 14 days. Quantity:  28 Tab Refills:  0 HYDROcodone-acetaminophen 5-325 mg per tablet Commonly known as:  Jonathan Peters Your last dose was: Your next dose is:    
   
   
 Dose:  1-2 Tab Take 1-2 Tabs by mouth every four (4) hours as needed. Max Daily Amount: 12 Tabs. Quantity:  30 Tab Refills:  0  
     
   
   
   
  
 pantoprazole 40 mg tablet Commonly known as:  PROTONIX Your last dose was: Your next dose is:    
   
   
 Dose:  40 mg Take 1 Tab by mouth two (2) times a day. Quantity:  60 Tab Refills:  1 CONTINUE these medications which have NOT CHANGED Dose & Instructions Dispensing Information Comments Morning Noon Evening Bedtime ALEVE 220 mg tablet Generic drug:  naproxen sodium Your last dose was: Your next dose is:    
   
   
 Dose:  440 mg Take 440 mg by mouth two (2) times daily as needed for Pain. Refills:  0  
     
   
   
   
  
 dicyclomine 20 mg tablet Commonly known as:  BENTYL Your last dose was:     
   
Your next dose is:    
   
   
 Dose:  20 mg  
 Take 1 Tab by mouth every six (6) hours as needed (abdominal cramps). Quantity:  20 Tab Refills:  0  
     
   
   
   
  
 lisinopril-hydroCHLOROthiazide 20-12.5 mg per tablet Commonly known as:  Calvin Carroll Your last dose was: Your next dose is:    
   
   
 Dose:  1 Tab Take 1 Tab by mouth daily. Refills:  0  
     
   
   
   
  
 loperamide 1 mg/5 mL solution Commonly known as:  IMODIUM Your last dose was: Your next dose is:    
   
   
 Dose:  1 tsp Take 1 tsp by mouth daily. Refills:  0  
     
   
   
   
  
 multivitamin tablet Commonly known as:  ONE A DAY Your last dose was: Your next dose is:    
   
   
 Dose:  2 Tab Take 2 Tabs by mouth daily. Refills:  0  
     
   
   
   
  
 nicotine 14 mg/24 hr patch Commonly known as:  [place] Clam Your last dose was: Your next dose is:    
   
   
 Dose:  1 Patch 1 Patch by TransDERmal route five (5) days a week. Monday through Friday as the patient is not allowed to smoke at work Refills:  0  
     
   
   
   
  
 THERATEARS 0.25 % Dpet Generic drug:  carboxymethylcellulose sodium Your last dose was: Your next dose is:    
   
   
 Dose:  1-2 Drop Apply 1-2 Drops to eye as needed (Dry eye). Refills:  0 STOP taking these medications   
 acetaminophen-codeine 300-30 mg per tablet Commonly known as:  TYLENOL-CODEINE #3  
   
  
 raNITIdine 150 mg tablet Commonly known as:  ZANTAC Where to Get Your Medications Information on where to get these meds will be given to you by the nurse or doctor. ! Ask your nurse or doctor about these medications  
  amoxicillin 500 mg capsule  
 clarithromycin 500 mg tablet HYDROcodone-acetaminophen 5-325 mg per tablet  
 pantoprazole 40 mg tablet

## 2018-12-21 ENCOUNTER — HOSPITAL ENCOUNTER (OUTPATIENT)
Age: 68
Setting detail: OUTPATIENT SURGERY
Discharge: HOME | End: 2018-12-21
Attending: INTERNAL MEDICINE | Admitting: INTERNAL MEDICINE
Payer: COMMERCIAL

## 2018-12-21 ENCOUNTER — ANESTHESIA (OUTPATIENT)
Dept: ENDOSCOPY | Age: 68
End: 2018-12-21
Payer: COMMERCIAL

## 2018-12-21 ENCOUNTER — ANESTHESIA EVENT (OUTPATIENT)
Dept: ENDOSCOPY | Age: 68
End: 2018-12-21
Payer: COMMERCIAL

## 2018-12-21 VITALS
HEIGHT: 63 IN | TEMPERATURE: 97.9 F | WEIGHT: 150 LBS | BODY MASS INDEX: 26.58 KG/M2 | SYSTOLIC BLOOD PRESSURE: 110 MMHG | OXYGEN SATURATION: 97 % | DIASTOLIC BLOOD PRESSURE: 62 MMHG | HEART RATE: 80 BPM | RESPIRATION RATE: 116 BRPM

## 2018-12-21 PROCEDURE — 76060000031 HC ANESTHESIA FIRST 0.5 HR: Performed by: INTERNAL MEDICINE

## 2018-12-21 PROCEDURE — 88305 TISSUE EXAM BY PATHOLOGIST: CPT

## 2018-12-21 PROCEDURE — 74011000250 HC RX REV CODE- 250

## 2018-12-21 PROCEDURE — 36415 COLL VENOUS BLD VENIPUNCTURE: CPT

## 2018-12-21 PROCEDURE — 76040000019: Performed by: INTERNAL MEDICINE

## 2018-12-21 PROCEDURE — 82941 ASSAY OF GASTRIN: CPT

## 2018-12-21 PROCEDURE — 74011250636 HC RX REV CODE- 250/636

## 2018-12-21 PROCEDURE — 77030009426 HC FCPS BIOP ENDOSC BSC -B: Performed by: INTERNAL MEDICINE

## 2018-12-21 PROCEDURE — 77030027957 HC TBNG IRR ENDOGTR BUSS -B: Performed by: INTERNAL MEDICINE

## 2018-12-21 PROCEDURE — 74011250637 HC RX REV CODE- 250/637: Performed by: INTERNAL MEDICINE

## 2018-12-21 RX ORDER — GLYCOPYRROLATE 0.2 MG/ML
INJECTION INTRAMUSCULAR; INTRAVENOUS AS NEEDED
Status: DISCONTINUED | OUTPATIENT
Start: 2018-12-21 | End: 2018-12-21 | Stop reason: HOSPADM

## 2018-12-21 RX ORDER — SODIUM CHLORIDE 9 MG/ML
50 INJECTION, SOLUTION INTRAVENOUS CONTINUOUS
Status: DISPENSED | OUTPATIENT
Start: 2018-12-21 | End: 2018-12-21

## 2018-12-21 RX ORDER — MIDAZOLAM HYDROCHLORIDE 1 MG/ML
.25-1 INJECTION, SOLUTION INTRAMUSCULAR; INTRAVENOUS
Status: ACTIVE | OUTPATIENT
Start: 2018-12-21 | End: 2018-12-21

## 2018-12-21 RX ORDER — SODIUM CHLORIDE 0.9 % (FLUSH) 0.9 %
5-10 SYRINGE (ML) INJECTION AS NEEDED
Status: DISCONTINUED | OUTPATIENT
Start: 2018-12-21 | End: 2018-12-26 | Stop reason: HOSPADM

## 2018-12-21 RX ORDER — SODIUM CHLORIDE 9 MG/ML
INJECTION, SOLUTION INTRAVENOUS
Status: DISCONTINUED | OUTPATIENT
Start: 2018-12-21 | End: 2018-12-21 | Stop reason: HOSPADM

## 2018-12-21 RX ORDER — FENTANYL CITRATE 50 UG/ML
100 INJECTION, SOLUTION INTRAMUSCULAR; INTRAVENOUS
Status: DISCONTINUED | OUTPATIENT
Start: 2018-12-21 | End: 2018-12-26 | Stop reason: HOSPADM

## 2018-12-21 RX ORDER — DEXTROMETHORPHAN/PSEUDOEPHED 2.5-7.5/.8
1.2 DROPS ORAL
Status: DISCONTINUED | OUTPATIENT
Start: 2018-12-21 | End: 2018-12-26 | Stop reason: HOSPADM

## 2018-12-21 RX ORDER — EPINEPHRINE 0.1 MG/ML
1 INJECTION INTRACARDIAC; INTRAVENOUS
Status: ACTIVE | OUTPATIENT
Start: 2018-12-21 | End: 2018-12-22

## 2018-12-21 RX ORDER — ATROPINE SULFATE 0.1 MG/ML
0.5 INJECTION INTRAVENOUS
Status: ACTIVE | OUTPATIENT
Start: 2018-12-21 | End: 2018-12-22

## 2018-12-21 RX ORDER — SODIUM CHLORIDE 0.9 % (FLUSH) 0.9 %
5-10 SYRINGE (ML) INJECTION EVERY 8 HOURS
Status: DISCONTINUED | OUTPATIENT
Start: 2018-12-21 | End: 2018-12-26 | Stop reason: HOSPADM

## 2018-12-21 RX ORDER — PROPOFOL 10 MG/ML
INJECTION, EMULSION INTRAVENOUS AS NEEDED
Status: DISCONTINUED | OUTPATIENT
Start: 2018-12-21 | End: 2018-12-21 | Stop reason: HOSPADM

## 2018-12-21 RX ORDER — NALOXONE HYDROCHLORIDE 0.4 MG/ML
0.4 INJECTION, SOLUTION INTRAMUSCULAR; INTRAVENOUS; SUBCUTANEOUS
Status: ACTIVE | OUTPATIENT
Start: 2018-12-21 | End: 2018-12-21

## 2018-12-21 RX ORDER — FLUMAZENIL 0.1 MG/ML
0.2 INJECTION INTRAVENOUS
Status: ACTIVE | OUTPATIENT
Start: 2018-12-21 | End: 2018-12-21

## 2018-12-21 RX ORDER — LIDOCAINE HYDROCHLORIDE 20 MG/ML
INJECTION, SOLUTION EPIDURAL; INFILTRATION; INTRACAUDAL; PERINEURAL AS NEEDED
Status: DISCONTINUED | OUTPATIENT
Start: 2018-12-21 | End: 2018-12-21 | Stop reason: HOSPADM

## 2018-12-21 RX ADMIN — LIDOCAINE HYDROCHLORIDE 40 MG: 20 INJECTION, SOLUTION EPIDURAL; INFILTRATION; INTRACAUDAL; PERINEURAL at 09:15

## 2018-12-21 RX ADMIN — PROPOFOL 50 MG: 10 INJECTION, EMULSION INTRAVENOUS at 09:25

## 2018-12-21 RX ADMIN — GLYCOPYRROLATE 0.2 MG: 0.2 INJECTION INTRAMUSCULAR; INTRAVENOUS at 09:19

## 2018-12-21 RX ADMIN — PROPOFOL 50 MG: 10 INJECTION, EMULSION INTRAVENOUS at 09:15

## 2018-12-21 RX ADMIN — PROPOFOL 50 MG: 10 INJECTION, EMULSION INTRAVENOUS at 09:16

## 2018-12-21 RX ADMIN — SODIUM CHLORIDE: 9 INJECTION, SOLUTION INTRAVENOUS at 09:13

## 2018-12-21 RX ADMIN — PROPOFOL 50 MG: 10 INJECTION, EMULSION INTRAVENOUS at 09:22

## 2018-12-21 NOTE — PROGRESS NOTES

## 2018-12-21 NOTE — PROCEDURES
[address] [facility].  [address] [facility] [address], [address]  [phone]                              Colonoscopy Procedure Note      Indications:    Diarrhea, Personal history of colon polyps (screening only)     :  Leslie Lin MD    Referring Provider: None    Sedation:  MAC anesthesia    Procedure Details:  After informed consent was obtained with all risks and benefits of procedure explained and preoperative exam completed, the patient was taken to the endoscopy suite and placed in the left lateral decubitus position. Upon sequential sedation as per above, a digital rectal exam was performed  And was normal.  The Olympus videocolonoscope  was inserted in the rectum and carefully advanced to the cecum, which was identified by the ileocecal valve and appendiceal orifice. The quality of preparation was good. The colonoscope was slowly withdrawn with careful evaluation between folds. Retroflexion in the rectum was performed and was normal..     Findings:   Rectum: 2  Sessile polyp(s), the largest 6 mm in size  Grade 2 internal hemorrhoid(s); Sigmoid: 2  Sessile polyp(s), the largest 6 mm in size was seen in distal sigmoid  Descending Colon: no mucosal lesion appreciated  Transverse Colon: no mucosal lesion appreciated  Ascending Colon: no mucosal lesion appreciated  Cecum: no mucosal lesion appreciated  Terminal Ileum: not intubated    Interventions:  Random biopsy of colon  4 complete polypectomy were performed using cold snare  and the polyps were  retrieved    Specimen Removed:    ID Type Source Tests Collected by Time Destination   1 : Random Colon Biopsies Rule Out Microscopic Colitis    Estefani Ohara MD 12/21/2018 3796 Pathology   2 : Sebastián Monik Ohara MD 12/21/2018 0004 Pathology       Complications: None. EBL:  None. Recommendations:   -Await pathology. -Repeat colonoscopy in 3 years.  -Low fat diet.      -NO aspirin for 7 days   -Start Cholestyramine 1 tablespoon in glass of water once daily with meals (donot take any other medication within 1 hour before or 2 hours after this medication)    Discharge Disposition:  Home in the company of a  when able to ambulate.     Shirley Ocasio MD  12/21/2018  9:33 AM

## 2018-12-21 NOTE — ANESTHESIA PREPROCEDURE EVALUATION
Anesthetic History   No history of anesthetic complications            Review of Systems / Medical History  Patient summary reviewed, nursing notes reviewed and pertinent labs reviewed    Pulmonary          Smoker         Neuro/Psych   Within defined limits           Cardiovascular    Hypertension                   GI/Hepatic/Renal           PUD     Endo/Other        Arthritis     Other Findings   Comments: Alcohol abuse           Physical Exam    Airway  Mallampati: II  TM Distance: > 6 cm  Neck ROM: normal range of motion   Mouth opening: Normal     Cardiovascular  Regular rate and rhythm,  S1 and S2 normal,  no murmur, click, rub, or gallop             Dental  No notable dental hx       Pulmonary  Breath sounds clear to auscultation               Abdominal  GI exam deferred       Other Findings            Anesthetic Plan    ASA: 3  Anesthesia type: MAC            Anesthetic plan and risks discussed with: Patient

## 2018-12-21 NOTE — PROGRESS NOTES
Cyndi Mcdaniels  1950  [id number]    Situation:  Verbal report received from: enrique  Procedure: Procedure(s):  COLONOSCOPY  COLON BIOPSY  ENDOSCOPIC POLYPECTOMY    Background:    Preoperative diagnosis: COLON POLYPS  Postoperative diagnosis: 1. Hemorrhoids  2.   Recto Sigmoid Polyp    :  Dr. Julian Steel  Assistant(s): Endoscopy Technician-1: Odilia Epley C  Endoscopy RN-1: Ernst Carroll RN    Specimens:   ID Type Source Tests Collected by Time Destination   1 : Random Colon Biopsies Rule Out Microscopic Colitis    Farida Reid MD 12/21/2018 3947 Pathology   2 : Karissa Perea MD 12/21/2018 9469 Pathology     H. Pylori  no    Assessment:  Intra-procedure medications   Anesthesia gave intra-procedure sedation and medications, see anesthesia flow sheet yes    Intravenous fluids: NS@ KVO     Vital signs stable    Abdominal assessment: round and soft *    Recommendation:  Discharge patient per MD order    Family or Friend   Permission to share finding with family or friend yes

## 2018-12-21 NOTE — DISCHARGE INSTRUCTIONS
[facility].  [facility], [address]  [address]  [id number]  1950    COLON DISCHARGE INSTRUCTIONS    DISCOMFORT:  Redness at IV site- apply warm compress to area; if redness or soreness persist- contact your physician  There may be a slight amount of blood passed from the rectum  Gaseous discomfort- walking, belching will help relieve any discomfort  You may not operate a vehicle for 12 hours  You may not  engage in an occupation involving machinery or appliances for rest of today  You may not  drink alcoholic beverages for at least 12 hours  Avoid making any critical decisions for at least 24 hour    DIET:   Low fat diet. - however -  remember your colon is empty and a heavy meal will produce gas. Avoid these foods:  vegetables, fried / greasy foods, carbonated drinks for today     ACTIVITY:  It is recommended that you spend the remainder of the day resting -  avoid any strenuous activity. CALL M.D. ANY SIGN OF:   Increasing pain, nausea, vomiting  Abdominal distension (swelling)  New increased bleeding (oral or rectal)  Fever (chills)  Pain in chest area  Bloody discharge from nose or mouth  Shortness of breath    You may not  take any Advil, Aspirin, Ibuprofen, Motrin, Aleve, or Goodys for 7 days, ONLY  Tylenol as needed for pain. Post procedure diagnosis:  1. Hemorrhoids  2. Recto Sigmoid Polyp    Follow-up Instructions:  -Await pathology. -Repeat colonoscopy in 3 years.  -Low fat diet. -NO aspirin for 7 days   -Start Cholestyramine 1 tablespoon in glass of water once daily with meals (donot take any other medication within 1 hour before or 2 hours after this medication)  Call Dr. Zac Najera for any questions or problems. If we took a biopsy please call the office within 2 weeks to discuss your                             pathology results.  Telephone # [phone]          Colon Polyps: Care Instructions  Your Care Instructions    Colon polyps are growths in the colon or the rectum. The cause of most colon polyps is not known, and most people who get them do not have any problems. But a certain kind can turn into cancer. For this reason, regular testing for colon polyps is important for people age 48 and older and anyone who has an increased risk for colon cancer. Polyps are usually found through routine colon cancer screening tests. Although most colon polyps are not cancerous, they are usually removed and then tested for cancer. Screening for colon cancer saves lives because the cancer can usually be cured if it is caught early. If you have a polyp that is the type that can turn into cancer, you may need more tests to examine your entire colon. The doctor will remove any other polyps that he or she finds, and you will be tested more often. Follow-up care is a key part of your treatment and safety. Be sure to make and go to all appointments, and call your doctor if you are having problems. It's also a good idea to know your test results and keep a list of the medicines you take. How can you care for yourself at home? Regular exams to look for colon polyps are the best way to prevent polyps from turning into colon cancer. These can include stool tests, sigmoidoscopy, colonoscopy, and CT colonography. Talk with your doctor about a testing schedule that is right for you. To prevent polyps  There is no home treatment that can prevent colon polyps. But these steps may help lower your risk for cancer. · Stay active. Being active can help you get to and stay at a healthy weight. Try to exercise on most days of the week. Walking is a good choice. · Eat well. Choose a variety of vegetables, fruits, legumes (such as peas and beans), fish, poultry, and whole grains. · Do not smoke. If you need help quitting, talk to your doctor about stop-smoking programs and medicines.  These can increase your chances of quitting for good. · If you drink alcohol, limit how much you drink. Limit alcohol to 2 drinks a day for men and 1 drink a day for women. When should you call for help? Call your doctor now or seek immediate medical care if:    · You have severe belly pain.     · Your stools are maroon or very bloody.    Watch closely for changes in your health, and be sure to contact your doctor if:    · You have a fever.     · You have nausea or vomiting.     · You have a change in bowel habits (new constipation or diarrhea).     · Your symptoms get worse or are not improving as expected. Where can you learn more? Go to http://gemini-feliciano.info/. Enter 95 131318 in the search box to learn more about \"Colon Polyps: Care Instructions. \"  Current as of: March 28, 2018  Content Version: 11.8  © 2050-9594 [employer]. Care instructions adapted under license by [employer] (which disclaims liability or warranty for this information). If you have questions about a medical condition or this instruction, always ask your healthcare professional. [place] [address] any warranty or liability for your use of this information. Hemorrhoids: Care Instructions  Your Care Instructions    Hemorrhoids are enlarged veins that develop in the anal canal. Bleeding during bowel movements, itching, swelling, and rectal pain are the most common symptoms. They can be uncomfortable at times, but hemorrhoids rarely are a serious problem. You can treat most hemorrhoids with simple changes to your diet and bowel habits. These changes include eating more fiber and not straining to pass stools. Most hemorrhoids do not need surgery or other treatment unless they are very large and painful or bleed a lot. Follow-up care is a key part of your treatment and safety. Be sure to make and go to all appointments, and call your doctor if you are having problems.  It's also a good idea to know your test results and keep a list of the medicines you take. How can you care for yourself at home? · Sit in a few inches of warm water (sitz bath) 3 times a day and after bowel movements. The warm water helps with pain and itching. · Put ice on your anal area several times a day for 10 minutes at a time. Put a thin cloth between the ice and your skin. Follow this by placing a warm, wet towel on the area for another 10 to 20 minutes. · Take pain medicines exactly as directed. ? If the doctor gave you a prescription medicine for pain, take it as prescribed. ? If you are not taking a prescription pain medicine, ask your doctor if you can take an over-the-counter medicine. · Keep the anal area clean, but be gentle. Use water and a fragrance-free soap, such as [place], or use baby wipes or medicated pads, such as Tucks. · Wear cotton underwear and loose clothing to decrease moisture in the anal area. · Eat more fiber. Include foods such as whole-grain breads and cereals, raw vegetables, raw and dried fruits, and beans. · Drink plenty of fluids, enough so that your urine is light yellow or clear like water. If you have kidney, heart, or liver disease and have to limit fluids, talk with your doctor before you increase the amount of fluids you drink. · Use a stool softener that contains bran or psyllium. You can save money by buying bran or psyllium (available in bulk at most health food stores) and sprinkling it on foods or stirring it into fruit juice. Or you can use a product such as Metamucil or Hydrocil. · Practice healthy bowel habits. ? Go to the bathroom as soon as you have the urge. ? Avoid straining to pass stools. Relax and give yourself time to let things happen naturally. ? Do not hold your breath while passing stools. ? Do not read while sitting on the toilet. Get off the toilet as soon as you have finished. · Take your medicines exactly as prescribed.  Call your doctor if you think you are having a problem with your medicine. When should you call for help? Call 911 anytime you think you may need emergency care. For example, call if:    · You pass maroon or very bloody stools.    Call your doctor now or seek immediate medical care if:    · You have increased pain.     · You have increased bleeding.    Watch closely for changes in your health, and be sure to contact your doctor if:    · Your symptoms have not improved after 3 or 4 days. Where can you learn more? Go to http://gemini-feliciano.info/. Enter F228 in the search box to learn more about \"Hemorrhoids: Care Instructions. \"  Current as of: March 28, 2018  Content Version: 11.8  © 5301-2612 [employer], [employer]. Care instructions adapted under license by [employer] (which disclaims liability or warranty for this information). If you have questions about a medical condition or this instruction, always ask your healthcare professional. [address] any warranty or liability for your use of this information.

## 2018-12-21 NOTE — H&P
118 [facility]  217 [facility] 140 [facility], [address]  [phone]                                History and Physical     NAME: Vannessa Lehman   :  1950   MRN:  [id number]     HPI:  The patient was seen and examined. Date of last colonoscopy: , Polyps  Yes  If this colonoscopy is less than 3 years from the previous colonoscopy, reason:  > 10 adenomas found     Past Surgical History:   Procedure Laterality Date    ABDOMEN SURGERY PROC UNLISTED      surgery for ulcer    COLONOSCOPY N/A 2017    COLONOSCOPY / EGD  performed by Mariam Dugan MD at [facility] ENDOSCOPY    [address]X [address]      x3 - 3 were disc surgeries with the last being afusion     HX BREAST BIOPSY Bilateral     benign    HX CATARACT REMOVAL Left     HX HYSTERECTOMY      HX TONSILLECTOMY       Past Medical History:   Diagnosis Date    Alcohol abuse     Arthritis     fingers/neck    Foot drop, left     Gastric ulcer     Hypertension     Stool color black      Social History     Tobacco Use    Smoking status: Current Every Day Smoker     Packs/day: 0.50     Types: Cigarettes    Smokeless tobacco: Never Used   Substance Use Topics    Alcohol use:  Yes     Alcohol/week: 17.5 oz     Types: 35 Shots of liquor per week     Comment: None since 2017    Drug use: No     Allergies   Allergen Reactions    Scallops Hives     Family History   Problem Relation Age of Onset    Other Mother         committed suicide    Heart Attack Father     Cancer Paternal Aunt         lung    Diabetes Maternal Grandfather     Heart Disease Paternal Grandmother         angina    Heart Attack Other      Current Facility-Administered Medications   Medication Dose Route Frequency    0.9% sodium chloride infusion  50 mL/hr IntraVENous CONTINUOUS    sodium chloride (NS) flush 5-10 mL  5-10 mL IntraVENous Q8H    sodium chloride (NS) flush 5-10 mL  5-10 mL IntraVENous PRN    midazolam (VERSED) injection 0.25-10 mg 0.25-10 mg IntraVENous Multiple    fentaNYL citrate (PF) injection 100 mcg  100 mcg IntraVENous MULTIPLE DOSE GIVEN    naloxone (NARCAN) injection 0.4 mg  0.4 mg IntraVENous Multiple    flumazenil (ROMAZICON) 0.1 mg/mL injection 0.2 mg  0.2 mg IntraVENous Multiple    simethicone (MYLICON) 41PS/9.5PJ oral drops 80 mg  1.2 mL Oral Multiple    atropine injection 0.5 mg  0.5 mg IntraVENous ONCE PRN    EPINEPHrine (ADRENALIN) 0.1 mg/mL syringe 1 mg  1 mg Endoscopically ONCE PRN         PHYSICAL EXAM:  General: WD, WN. Alert, cooperative, no acute distress    HEENT: NC, Atraumatic. PERRLA, EOMI. Anicteric sclerae. Lungs:  CTA Bilaterally. No Wheezing/Rhonchi/Rales. Heart:  Regular  rhythm,  No murmur, No Rubs, No Gallops  Abdomen: Soft, Non distended, Non tender.  +Bowel sounds, no HSM  Extremities: No c/c/e  Neurologic:  CN 2-12 gi, Alert and oriented X 3. No acute neurological distress   Psych:   Good insight. Not anxious nor agitated. The heart, lungs and mental status were satisfactory for the administration of MAC sedation and for the procedure.       Mallampati score: 3       Assessment:   · Personal history colon polyps    Plan:   · Endoscopic procedure  · MAC sedation   ·   ·

## 2018-12-21 NOTE — ANESTHESIA POSTPROCEDURE EVALUATION
Procedure(s):  COLONOSCOPY  COLON BIOPSY  ENDOSCOPIC POLYPECTOMY.    <BSHSIANPOST>    Visit Vitals  BP 94/51   Pulse 75   Temp 36.6 °C (97.8 °F)   Resp 17   Ht 5' 3\" (1.6 m)   Wt 68 kg (150 lb)   SpO2 98%   BMI 26.57 kg/m²

## 2018-12-22 LAB — GASTRIN SERPL-MCNC: 28 PG/ML (ref 0–115)

## 2022-03-18 PROBLEM — N17.9 AKI (ACUTE KIDNEY INJURY) (HCC): Status: ACTIVE | Noted: 2017-07-26

## 2022-03-19 PROBLEM — F10.20 CHRONIC ALCOHOLISM (HCC): Status: ACTIVE | Noted: 2017-07-26

## 2022-03-19 PROBLEM — R19.8 PERFORATED VISCUS: Status: ACTIVE | Noted: 2017-07-26

## 2022-06-21 RX ORDER — PANTOPRAZOLE SODIUM 20 MG/1
20 TABLET, DELAYED RELEASE ORAL DAILY
COMMUNITY

## 2022-06-21 RX ORDER — PREDNISOLONE ACETATE 10 MG/ML
1 SUSPENSION/ DROPS OPHTHALMIC 4 TIMES DAILY
COMMUNITY

## 2022-06-21 RX ORDER — OFLOXACIN 3 MG/ML
1 SOLUTION/ DROPS OPHTHALMIC 4 TIMES DAILY
Status: ON HOLD | COMMUNITY
End: 2022-06-29

## 2022-06-28 ENCOUNTER — ANESTHESIA EVENT (OUTPATIENT)
Dept: ENDOSCOPY | Age: 72
End: 2022-06-28
Payer: COMMERCIAL

## 2022-06-29 ENCOUNTER — ANESTHESIA (OUTPATIENT)
Dept: ENDOSCOPY | Age: 72
End: 2022-06-29
Payer: COMMERCIAL

## 2022-06-29 ENCOUNTER — HOSPITAL ENCOUNTER (OUTPATIENT)
Age: 72
Setting detail: OUTPATIENT SURGERY
Discharge: HOME | End: 2022-06-29
Attending: INTERNAL MEDICINE | Admitting: INTERNAL MEDICINE
Payer: COMMERCIAL

## 2022-06-29 VITALS
OXYGEN SATURATION: 97 % | RESPIRATION RATE: 16 BRPM | SYSTOLIC BLOOD PRESSURE: 103 MMHG | BODY MASS INDEX: 24.8 KG/M2 | WEIGHT: 140 LBS | HEART RATE: 66 BPM | HEIGHT: 63 IN | DIASTOLIC BLOOD PRESSURE: 59 MMHG | TEMPERATURE: 97.8 F

## 2022-06-29 PROCEDURE — 74011250637 HC RX REV CODE- 250/637: Performed by: INTERNAL MEDICINE

## 2022-06-29 PROCEDURE — 88305 TISSUE EXAM BY PATHOLOGIST: CPT

## 2022-06-29 PROCEDURE — 76060000031 HC ANESTHESIA FIRST 0.5 HR: Performed by: INTERNAL MEDICINE

## 2022-06-29 PROCEDURE — 2709999900 HC NON-CHARGEABLE SUPPLY: Performed by: INTERNAL MEDICINE

## 2022-06-29 PROCEDURE — 77030013992 HC SNR POLYP ENDOSC BSC -B: Performed by: INTERNAL MEDICINE

## 2022-06-29 PROCEDURE — 76040000019: Performed by: INTERNAL MEDICINE

## 2022-06-29 PROCEDURE — 74011000250 HC RX REV CODE- 250: Performed by: NURSE ANESTHETIST, CERTIFIED REGISTERED

## 2022-06-29 PROCEDURE — 74011250636 HC RX REV CODE- 250/636: Performed by: NURSE ANESTHETIST, CERTIFIED REGISTERED

## 2022-06-29 RX ORDER — FLUMAZENIL 0.1 MG/ML
0.2 INJECTION INTRAVENOUS
Status: DISCONTINUED | OUTPATIENT
Start: 2022-06-29 | End: 2022-06-29 | Stop reason: HOSPADM

## 2022-06-29 RX ORDER — SODIUM CHLORIDE 9 MG/ML
50 INJECTION, SOLUTION INTRAVENOUS CONTINUOUS
Status: DISCONTINUED | OUTPATIENT
Start: 2022-06-29 | End: 2022-06-29 | Stop reason: HOSPADM

## 2022-06-29 RX ORDER — ATROPINE SULFATE 0.1 MG/ML
0.5 INJECTION INTRAVENOUS
Status: DISCONTINUED | OUTPATIENT
Start: 2022-06-29 | End: 2022-06-29 | Stop reason: HOSPADM

## 2022-06-29 RX ORDER — LIDOCAINE HYDROCHLORIDE 20 MG/ML
INJECTION, SOLUTION EPIDURAL; INFILTRATION; INTRACAUDAL; PERINEURAL AS NEEDED
Status: DISCONTINUED | OUTPATIENT
Start: 2022-06-29 | End: 2022-06-29 | Stop reason: HOSPADM

## 2022-06-29 RX ORDER — NALOXONE HYDROCHLORIDE 0.4 MG/ML
0.4 INJECTION, SOLUTION INTRAMUSCULAR; INTRAVENOUS; SUBCUTANEOUS
Status: DISCONTINUED | OUTPATIENT
Start: 2022-06-29 | End: 2022-06-29 | Stop reason: HOSPADM

## 2022-06-29 RX ORDER — SODIUM CHLORIDE 0.9 % (FLUSH) 0.9 %
5-40 SYRINGE (ML) INJECTION EVERY 8 HOURS
Status: DISCONTINUED | OUTPATIENT
Start: 2022-06-29 | End: 2022-06-29 | Stop reason: HOSPADM

## 2022-06-29 RX ORDER — SODIUM CHLORIDE 0.9 % (FLUSH) 0.9 %
5-40 SYRINGE (ML) INJECTION AS NEEDED
Status: DISCONTINUED | OUTPATIENT
Start: 2022-06-29 | End: 2022-06-29 | Stop reason: HOSPADM

## 2022-06-29 RX ORDER — EPINEPHRINE 0.1 MG/ML
1 INJECTION INTRACARDIAC; INTRAVENOUS
Status: DISCONTINUED | OUTPATIENT
Start: 2022-06-29 | End: 2022-06-29 | Stop reason: HOSPADM

## 2022-06-29 RX ORDER — DEXTROMETHORPHAN/PSEUDOEPHED 2.5-7.5/.8
1.2 DROPS ORAL
Status: DISCONTINUED | OUTPATIENT
Start: 2022-06-29 | End: 2022-06-29 | Stop reason: HOSPADM

## 2022-06-29 RX ORDER — SODIUM CHLORIDE 9 MG/ML
INJECTION, SOLUTION INTRAVENOUS
Status: DISCONTINUED | OUTPATIENT
Start: 2022-06-29 | End: 2022-06-29 | Stop reason: HOSPADM

## 2022-06-29 RX ORDER — PROPOFOL 10 MG/ML
INJECTION, EMULSION INTRAVENOUS AS NEEDED
Status: DISCONTINUED | OUTPATIENT
Start: 2022-06-29 | End: 2022-06-29 | Stop reason: HOSPADM

## 2022-06-29 RX ADMIN — LIDOCAINE HYDROCHLORIDE 20 MG: 20 INJECTION, SOLUTION EPIDURAL; INFILTRATION; INTRACAUDAL; PERINEURAL at 08:21

## 2022-06-29 RX ADMIN — PROPOFOL 25 MG: 10 INJECTION, EMULSION INTRAVENOUS at 08:36

## 2022-06-29 RX ADMIN — PROPOFOL 25 MG: 10 INJECTION, EMULSION INTRAVENOUS at 08:30

## 2022-06-29 RX ADMIN — PROPOFOL 25 MG: 10 INJECTION, EMULSION INTRAVENOUS at 08:24

## 2022-06-29 RX ADMIN — PROPOFOL 25 MG: 10 INJECTION, EMULSION INTRAVENOUS at 08:22

## 2022-06-29 RX ADMIN — PROPOFOL 25 MG: 10 INJECTION, EMULSION INTRAVENOUS at 08:26

## 2022-06-29 RX ADMIN — PROPOFOL 25 MG: 10 INJECTION, EMULSION INTRAVENOUS at 08:23

## 2022-06-29 RX ADMIN — PROPOFOL 50 MG: 10 INJECTION, EMULSION INTRAVENOUS at 08:21

## 2022-06-29 RX ADMIN — PROPOFOL 25 MG: 10 INJECTION, EMULSION INTRAVENOUS at 08:34

## 2022-06-29 RX ADMIN — PROPOFOL 25 MG: 10 INJECTION, EMULSION INTRAVENOUS at 08:32

## 2022-06-29 RX ADMIN — SODIUM CHLORIDE: 900 INJECTION, SOLUTION INTRAVENOUS at 08:01

## 2022-06-29 RX ADMIN — PROPOFOL 25 MG: 10 INJECTION, EMULSION INTRAVENOUS at 08:28

## 2022-06-29 NOTE — DISCHARGE INSTRUCTIONS
118 [facility]  217 [place] [address] [address], [address]  [address]  [id number]  1950    COLON DISCHARGE INSTRUCTIONS    DISCOMFORT:  Redness at IV site- apply warm compress to area; if redness or soreness persist- contact your physician  There may be a slight amount of blood passed from the rectum  Gaseous discomfort- walking, belching will help relieve any discomfort      DIET:   High fiber diet. - however -  remember your colon is empty and a heavy meal will produce gas. Avoid these foods:  vegetables, fried / greasy foods, carbonated drinks for today  You may not  drink alcoholic beverages for at least 12 hours     ACTIVITY:  It is recommended that you spend the remainder of the day resting -  avoid any strenuous activity. You may not operate a vehicle for 12 hours  You may not  engage in an occupation involving machinery or appliances for rest of today    Avoid making any critical decisions for at least 24 hour    CALL M.D. ANY SIGN OF:   Increasing pain, nausea, vomiting  Abdominal distension (swelling)  New increased bleeding (oral or rectal)  Fever (chills)  Pain in chest area  Bloody discharge from nose or mouth  Shortness of breath    You may not  take any Advil, Aspirin, Ibuprofen, Motrin, Aleve, or Goodys for 7 days, ONLY  Tylenol as needed for pain. Post procedure diagnosis: 1. Colon Polyps  2. Diverticulosis  3. Internal Hemorrhoids      Post-procedure recommendations:-Await pathology. -Repeat colonoscopy in 3 years.  -High fiber diet.     -Resume normal medication(s). -NO aspirin for 7 days     Follow-up Instructions:    Call Dr. Krista Saucedo for any questions or problems. If we took a biopsy please call the office within 2 weeks to discuss your  pathology results. Telephone # [phone]         Learning About Coronavirus [phone])  Coronavirus [phone]): Overview  What is coronavirus (COVID-19)?   The coronavirus disease (COVID-19) is caused by a virus. It is an illness that was [facility]. It has since spread worldwide. The virus can cause fever, cough, and trouble breathing. In severe cases, it can cause pneumonia and make it hard to breathe without help. It can cause death. Coronaviruses are a large group of viruses. They cause the common cold. They also cause more serious illnesses like Middle East respiratory syndrome (MERS) and severe acute respiratory syndrome (SARS). COVID-19 is caused by a novel coronavirus. That means it's a new type that has not been seen in people before. This virus spreads person-to-person through droplets from coughing and sneezing. It can also spread when you are close to someone who is infected. And it can spread when you touch something that has the virus on it, such as a doorknob or a tabletop. What can you do to protect yourself from coronavirus (COVID-19)? The best way to protect yourself from getting sick is to:  · Avoid areas where there is an outbreak. · Avoid contact with people who may be infected. · Wash your hands often with soap or alcohol-based hand sanitizers. · Avoid crowds and try to stay at least 6 feet away from other people. · Wash your hands often, especially after you cough or sneeze. Use soap and water, and scrub for at least 20 seconds. If soap and water aren't available, use an alcohol-based hand . · Avoid touching your mouth, nose, and eyes. What can you do to avoid spreading the virus to others? To help avoid spreading the virus to others:  · Cover your mouth with a tissue when you cough or sneeze. Then throw the tissue in the trash. · Use a disinfectant to clean things that you touch often. · Stay home if you are sick or have been exposed to the virus. Don't go to school, work, or public areas. And don't use public transportation.   · If you are sick:  ? Leave your home only if you need to get medical care. But call the doctor's office first so they know you're coming. And wear a face mask, if you have one.  ? If you have a face mask, wear it whenever you're around other people. It can help stop the spread of the virus when you cough or sneeze. ? Clean and disinfect your home every day. Use household  and disinfectant wipes or sprays. Take special care to clean things that you grab with your hands. These include doorknobs, remote controls, phones, and handles on your refrigerator and microwave. And don't forget countertops, tabletops, bathrooms, and computer keyboards. When to call for help  Call 911 anytime you think you may need emergency care. For example, call if:  · You have severe trouble breathing. (You can't talk at all.)  · You have constant chest pain or pressure. · You are severely dizzy or lightheaded. · You are confused or can't think clearly. · Your face and lips have a blue color. · You pass out (lose consciousness) or are very hard to wake up. Call your doctor now if you develop symptoms such as:  · Shortness of breath. · Fever. · Cough. If you need to get care, call ahead to the doctor's office for instructions before you go. Make sure you wear a face mask, if you have one, to prevent exposing other people to the virus. Where can you get the latest information? The following health organizations are tracking and studying this virus. Their websites contain the most up-to-date information. Paulita Dakin also learn what to do if you think you may have been exposed to the virus. · U.S. Centers for Disease Control and Prevention (CDC): The CDC provides updated news about the disease and travel advice. The website also tells you how to prevent the spread of infection. www.cdc.gov  · World Health Organization [facility]): WHO offers information about the virus outbreaks.  WHO also has travel advice. www.who.int  Current as of: April 1, 2020               Content Version: 12.4  © 5887-9836 Healthwise, Incorporated. Care instructions adapted under license by your healthcare professional. If you have questions about a medical condition or this instruction, always ask your healthcare professional. [address] any warranty or liability for your use of this information.

## 2022-06-29 NOTE — PROCEDURES
[address]   217 [facility] [address], [address]  [phone]                              Colonoscopy Procedure Note      Indications:    Personal history of colon polyps (screening only)     :  Marshal Horton MD    Surgical Assistant: Endoscopy Technician-1: Odalys Blas  Endoscopy RN-1: Maite Acuña RN    Implants: none    Referring Provider: Cinthya Flower NP    Sedation:  MAC anesthesia    Procedure Details:  After informed consent was obtained with all risks and benefits of procedure explained and preoperative exam completed, the patient was taken to the endoscopy suite and placed in the left lateral decubitus position. Upon sequential sedation as per above, a digital rectal exam was performed  And was normal.  The Olympus videocolonoscope  was inserted in the rectum and carefully advanced to the cecum, which was identified by the ileocecal valve and appendiceal orifice. The quality of preparation was good. The colonoscope was slowly withdrawn with careful evaluation between folds. Retroflexion in the rectum was performed and was normal..     Findings:   Rectum: 1  Sessile polyp(s), the largest 6 mm in size  Grade 2 internal and external hemorrhoid(s);   Sigmoid: no mucosal lesion appreciated      - Diverticulosis  Descending Colon: no mucosal lesion appreciated  Transverse Colon: no mucosal lesion appreciated  Ascending Colon: 2  Sessile polyp(s), the largest 6 mm in size noted at hepatic flexure and ascending colon;  Cecum: no mucosal lesion appreciated  Terminal Ileum: not intubated    Interventions:  3 complete polypectomy were performed using cold snare  and the polyps were  retrieved    Specimen Removed:    ID Type Source Tests Collected by Time Destination   1 : hepatic flexure polyp Preservative Hepatic Flexure  Rosita Lincoln MD 6/29/2022 0830 Pathology   2 : rectal polyp Preservative Rectum  Rosita Lincoln MD 6/29/2022 4072 Pathology Complications: None. EBL:  None. Recommendations: -Await pathology. -Repeat colonoscopy in 3 years.  -High fiber diet.     -Resume normal medication(s). -NO aspirin for 7 days     Discharge Disposition:  Home in the company of a  when able to ambulate.     Christy Carl MD  6/29/2022  8:45 AM

## 2022-06-29 NOTE — ANESTHESIA POSTPROCEDURE EVALUATION
Post-Anesthesia Evaluation and Assessment    Patient: Sascha Curry MRN: [id number]  SSN: [id number]    YOB: 1950  Age: 66 y.o. Sex: female      I have evaluated the patient and they are stable and ready for discharge from the PACU. Cardiovascular Function/Vital Signs  Visit Vitals  BP (!) 103/59   Pulse 66   Temp 36.6 °C (97.8 °F)   Resp 16   Ht 5' 3\" (1.6 m)   Wt 63.5 kg (140 lb)   SpO2 97%   Breastfeeding No   BMI 24.80 kg/m²       Patient is status post MAC anesthesia for Procedure(s):  COLONOSCOPY  ENDOSCOPIC POLYPECTOMY. Nausea/Vomiting: None    Postoperative hydration reviewed and adequate. Pain:  Pain Scale 1: Numeric (0 - 10) (06/29/22 0913)  Pain Intensity 1: 3 (06/29/22 0913)   Managed    Neurological Status: At baseline    Mental Status, Level of Consciousness: Alert and  oriented to person, place, and time    Pulmonary Status:   O2 Device: None (Room air) (06/29/22 0913)   Adequate oxygenation and airway patent    Complications related to anesthesia: None    Post-anesthesia assessment completed. No concerns    Signed By: Bhargavi Shetty MD     June 29, 2022              Procedure(s):  COLONOSCOPY  ENDOSCOPIC POLYPECTOMY. MAC    <BSHSIANPOST>    INITIAL Post-op Vital signs:   Vitals Value Taken Time   /59 06/29/22 0905   Temp 36.6 °C (97.8 °F) 06/29/22 0850   Pulse 67 06/29/22 0907   Resp 14 06/29/22 0907   SpO2 97 % 06/29/22 0905   Vitals shown include unvalidated device data.

## 2022-06-29 NOTE — ANESTHESIA PREPROCEDURE EVALUATION
Anesthetic History   No history of anesthetic complications            Review of Systems / Medical History  Patient summary reviewed, nursing notes reviewed and pertinent labs reviewed    Pulmonary          Smoker         Neuro/Psych   Within defined limits           Cardiovascular    Hypertension                   GI/Hepatic/Renal           PUD     Endo/Other        Arthritis     Other Findings   Comments: Alcohol abuse           Physical Exam    Airway  Mallampati: II  TM Distance: > 6 cm  Neck ROM: normal range of motion   Mouth opening: Normal     Cardiovascular  Regular rate and rhythm,  S1 and S2 normal,  no murmur, click, rub, or gallop             Dental  No notable dental hx       Pulmonary  Breath sounds clear to auscultation               Abdominal  GI exam deferred       Other Findings            Anesthetic Plan    ASA: 3  Anesthesia type: MAC            Anesthetic plan and risks discussed with: Patient Subjective:  I am doing well no pain      Objective:     Current level of performance:  ADL: Independent  Work: Student  Leisure: Basketball    Measurements/Tests:  ROM:  Testing By: tiff   Strength Right: 70 #      Strength Left: 50 #      Treatmen

## 2022-06-29 NOTE — ROUTINE PROCESS
Edmundo Wellser  1950  [id number]    Situation:  Verbal report received from: Liz Eryn  Procedure: Procedure(s):  COLONOSCOPY  ENDOSCOPIC POLYPECTOMY    Background:    Preoperative diagnosis: Personal history of colonic polyps [Z86.010]  Postoperative diagnosis: 1. Colon Polyps  2. Diverticulosis  3. Internal Hemorrhoids    :  Dr. Sami Galvin  Assistant(s): Endoscopy Technician-1: Rocky Lizarraga  Endoscopy RN-1: Margaret Love RN    Specimens:   ID Type Source Tests Collected by Time Destination   1 : hepatic flexure polyp Preservative Hepatic Flexure  Pola Anderson MD 6/29/2022 0830 Pathology   2 : rectal polyp Preservative Rectum  Pola Anderson MD 6/29/2022 3991 Pathology     H. Pylori  no    Assessment:  Intra-procedure medications   Anesthesia gave intra-procedure sedation and medications, see anesthesia flow sheet yes    Intravenous fluids: NS@ KVO     Vital signs stable     Abdominal assessment: round and soft     Recommendation:  Discharge patient per MD order.   Family or Friend Spouse in car  Permission to share finding with family or friend yes

## 2022-06-29 NOTE — H&P
[address] [facility] [address]  [address] [place]  [place], [address]  [phone]                                History and Physical     NAME: Dimas Griffin   :  1950   MRN:  [id number]     HPI:  The patient was seen and examined.     Past Surgical History:   Procedure Laterality Date    COLONOSCOPY N/A 2017    COLONOSCOPY / EGD  performed by Jai Schrader MD at [address] COLONOSCOPY N/A 2018    COLONOSCOPY performed by Beck Glover MD at [address]      x3 - 3 were disc surgeries with the last being afusion     HX BREAST BIOPSY Bilateral     benign    HX CATARACT REMOVAL Bilateral     HX HEENT Right     right eye cancer - radiation implant inserted x 5 days then removed    HX HYSTERECTOMY      HX TONSILLECTOMY      OK ABDOMEN SURGERY PROC UNLISTED      surgery for ulcer     Past Medical History:   Diagnosis Date    Alcohol abuse     Arthritis     fingers/neck    Cancer ([address]     right eye cancer - radiation implant x 5 days then removed - melanoma    Foot drop, left     Gastric ulcer     Hypertension     no longer on medications    Stool color black      Social History     Tobacco Use    Smoking status: Current Every Day Smoker     Packs/day: 0.50     Types: Cigarettes    Smokeless tobacco: Never Used   Vaping Use    Vaping Use: Never used   Substance Use Topics    Alcohol use: Not Currently     Alcohol/week: 29.2 standard drinks     Types: 35 Shots of liquor per week     Comment: None since 2017    Drug use: No     Allergies   Allergen Reactions    Scallops Hives     Family History   Problem Relation Age of Onset    Other Mother         committed suicide    Heart Attack Father     Cancer Paternal Aunt         lung    Diabetes Maternal Grandfather     Heart Disease Paternal Grandmother         angina    Heart Attack Other     Cancer Sister         melanoma - eye     Current Facility-Administered Medications Medication Dose Route Frequency    0.9% sodium chloride infusion  50 mL/hr IntraVENous CONTINUOUS    sodium chloride (NS) flush 5-40 mL  5-40 mL IntraVENous Q8H    sodium chloride (NS) flush 5-40 mL  5-40 mL IntraVENous PRN    naloxone (NARCAN) injection 0.4 mg  0.4 mg IntraVENous Multiple    flumazeniL (ROMAZICON) 0.1 mg/mL injection 0.2 mg  0.2 mg IntraVENous Multiple    simethicone (MYLICON) 69OO/1.9AP oral drops 80 mg  1.2 mL Oral Multiple    atropine injection 0.5 mg  0.5 mg IntraVENous ONCE PRN    EPINEPHrine (ADRENALIN) 0.1 mg/mL syringe 1 mg  1 mg Endoscopically ONCE PRN         PHYSICAL EXAM:  General: WD, WN. Alert, cooperative, no acute distress    HEENT: NC, Atraumatic. PERRLA, EOMI. Anicteric sclerae. Lungs:  CTA Bilaterally. No Wheezing/Rhonchi/Rales. Heart:  Regular  rhythm,  No murmur, No Rubs, No Gallops  Abdomen: Soft, Non distended, Non tender. +Bowel sounds, no HSM  Extremities: No c/c/e  Neurologic:  CN 2-12 gi, Alert and oriented X 3. No acute neurological distress   Psych:   Good insight. Not anxious nor agitated. The heart, lungs and mental status were satisfactory for the administration of MAC sedation and for the procedure. Mallampati score: 2     The patient was counseled at length about the risks of leland Covid-19 in the rocio-operative and post-operative states including the recovery window of their procedure. The patient was made aware that leland Covid-19 after a surgical procedure may worsen their prognosis for recovering from the virus and lend to a higher morbidity and or mortality risk. The patient was given the options of postponing their procedure. All of the risks, benefits, and alternatives were discussed. The patient does  wish to proceed with the procedure.       Assessment:   · Personal history of colon polyps    Plan:   · Endoscopic procedure  · MAC sedation   ·

## 2022-12-20 NOTE — PROCEDURES
118 [facility]  [address] [place] [address] [address], [address]   [phone]                           Colonoscopy and EGD Procedure Note      Indications:    Screening colonoscopy, Gastric ulcer with perforation     :  Raynelle Boast, MD    Referring Provider: None    Sedation:  MAC anesthesia    Procedure Details:  After informed consent was obtained with all risks and benefits of procedure explained and preoperative exam completed, the patient was taken to the endoscopy suite and placed in the left lateral decubitus position. Upon sequential sedation as per above, a digital rectal exam was performed  And was normal.  The Olympus videocolonoscope  was inserted in the rectum and carefully advanced to the cecum, which was identified by the ileocecal valve and appendiceal orifice. The quality of preparation was good. The colonoscope was slowly withdrawn with careful evaluation between folds. Retroflexion in the rectum was performed and was normal..     Colon Findings:   Rectum: Ten sessile polyps 5-8 mm in size were seen in rectum. Small internal hemorrhoids were seen  Sigmoid: About 20 sessile polyps with size ranging from 5 mm -12 mm were seen in sigmoid and recto-sigmoid region. Small mouth diverticula were seen as well  Descending Colon: no mucosal lesion appreciated  Transverse Colon: no mucosal lesion appreciated  Ascending Colon: no mucosal lesion appreciated  Cecum: no mucosal lesion appreciated  Terminal Ileum: not intubated      Following sequential administration of sedation as per above, the [id number] gastroscope was inserted into the mouth and advanced under direct vision to second portion of the duodenum. A careful inspection was made as the gastroscope was withdrawn, including a retroflexed view of the proximal stomach; findings and interventions are described below. EGD Findings:  Esophagus:normal  Stomach: Mild erythema was noted in gastric antrum.  No residual ulcer Pt called. Had a long discussion about results. No further questions.  or scarring was seen  Duodenum/jejunum:Normal appearing mucosa in bulb and 2nd portion of duodenum      Therapies:  30 complete polypectomy were performed using hot and cold snare  and the polyps were  retrieved    Complications:   None; patient tolerated the procedure well. Impression:    See Postoperative diagnosis above    Recommendations:  -Continue acid suppression. , -Await pathology. , -Follow symptoms. , -High fiber diet. , -No NSAIDS  -Repeat colonoscopy in 1 year   Resume normal medication(s). -NO aspirin/NSAID's for 7 days     Interventions:  30 complete polypectomy were performed using hot and cold snare  and the polyps were  retrieved    Complications: None. Specimen Removed:    ID Type Source Tests Collected by Time Destination   1 : polyps  Preservative Sigmoid  Niraj Frankel MD 11/13/2017 1527 Pathology   2 : polyps Preservative Colon, Recto-sigmoid  Niraj Frankel MD 11/13/2017 1536 Pathology       EBL:  None. Discharge Disposition:  Home in the company of a  when able to ambulate.     Niraj Frankel MD  11/13/2017  3:51 PM

## 2023-02-21 NOTE — OP NOTES
[address], [address]   OP NOTE       Name:  Salima Grajeda   MR#:  [id number]   :  1950   Account #:  [de-identified]    Surgery Date:  2017   Date of Adm:  2017       PREOPERATIVE DIAGNOSIS: Perforated viscus. POSTOPERATIVE DIAGNOSIS: Perforated prepyloric peptic ulcer. PROCEDURES PERFORMED:     1. Exploratory laparotomy. 2. Repair of perforated gastric ulcer and gram patch omental rotational   flap coverage. SURGEON: Josi Medina. Li Bond MD    ESTIMATED BLOOD LOSS: Less than 25 mL. SPECIMENS REMOVED: Gastric ulcer wall sent to Pathology to rule   out malignancy and peritoneal fluid for aerobic and anaerobic culture   and Gram stain. ANESTHESIA:  General endotracheal anesthesia. FINDINGS: Perforated prepyloric gastric ulcer about 1 cm in size with   minimal peritoneal spillage. DESCRIPTION OF PROCEDURE: After appropriate consent was   obtained, the patient, who was competent, was brought to the   operating room, made comfortable in the supine position, administered   general endotracheal anesthesia. The patient was prepped and draped   in standard fashion after [place] catheter was placed. An upper midline   incision was made and this was extended down through the midline   fascia. The abdominal cavity was entered. There was evidence of   pneumoperitoneum and some turbid fluid. This was cultured for   aerobic, anaerobic and Gram stain. The abdomen was explored, and   the patient was found to have a perforated ulcer in the prepyloric distal   gastric antrum anteriorly. This was about a centimeter in size. This was   controlled by placing stay sutures on either side and then closing the   ulcer longitudinally in the direction of the intestinal access. This was   done with several interrupted 3-0 silk Lembert sutures. When this was complete, the closure was inspected and felt to be   satisfactory. [facility] Subsequently, a rotational omental flap was created. This   was tacked down with several interrupted 3-0 silk sutures to reinforce   the site of the gastrorrhaphy. A 15-Bangladeshi drain was then placed in the   hepatorenal space and brought out through a separate stab wound   incision on the right. This was sewn in place with a 3-0 nylon skin   stitch. The viscera was returned to the native location. The abdomen was irrigated with saline solution and all peritoneal fluid   was aspirated. The midline fascia was closed with #1 PDS running   suture x2. The skin incision was closed with skin staples. The wound   was cleaned, dried, and dressed with sterile dressings and BASSAM drain   was connected to bulb suction.         MD TAMICA Mejia / [email or web address]   D:  07/27/2017   14:18   T:  07/27/2017   15:35   Job #:  [id number] BIOTIN PO Take 500 mg by mouth daily.     • clindamycin (CLEOCIN T) 1 % topical solution Apply topically daily.     • ketoconazole 1 % shampoo      • fluticasone (FLONASE) 50 MCG/ACT nasal spray Spray 1 spray in each nostril 2 times daily. 16 g 11   • Cholecalciferol (VITAMIN D3) 3000 units Tab Take 1 tablet by mouth daily. 1 tablet 0   • Spacer/Aero-Holding Chambers MELANY Use as directed w/albuterol inhaler (Patient taking differently: Use as directed w/albuterol inhaler prn) 1 Device 0     No current facility-administered medications for this visit.     Past Medical History:   Diagnosis Date   • Anemia    • Arthritis    • Asthma, allergic    • Breast disorder     cysts    • Colon polyp     cecum-sessile serrated polyp   • Diverticulosis    • Elevated cholesterol    • Erosive gastritis 11/10/2014   • Esophageal erosions    • Gastroesophageal reflux disease with hiatal hernia     hiatal hernia 7cm    • H/O seasonal allergies    • History of migraine headaches    • HTN (hypertension)    • Hx of endometriosis     pt underwent complete hysterectony/BSO    • Obesity (BMI 30-39.9)    • Osteoporosis    • Other psoriasis    • PONV (postoperative nausea and vomiting)    • Primary osteoarthritis of both knees 10/26/2020   • Seizures (CMS/HCC)     childhood   • Sinusitis, chronic      Past Surgical History:   Procedure Laterality Date   • Abdomen surgery     • Cataract extraction w/  intraocular lens implant Right 2017   •  section, classic      x2-failure to progress/transverse position   •  section, low transverse     • Colonoscopy  2017   • Egd  2017   • Eye surgery Left 2016    Cataract   • Hysterectomy     • Service to gastroenterology      egd/colonoscopy   • Total abdominal hysterectomy w/ bilateral salpingoophorectomy      for endometriosis-pt denies any dx of cancer     pain.  Skin:  No change in moles.  No rash.   Neurologic:  No weakness.  No numbness.  No headache.  No dizziness.     Endocrine:  No heat intolerance.  No cold intolerance.  Psychiatric:  No depression.  No appetite changes.  No changes in sleep.      PHYSICAL EXAM:  Vital Signs:    Visit Vitals  BP (!) 140/80 (BP Location: LUE - Left upper extremity, Patient Position: Sitting, Cuff Size: Regular)   Pulse 78   Resp 17   Ht 5' 4\" (1.626 m)   Wt 83.2 kg (183 lb 6.4 oz)   LMP 01/01/1996   SpO2 99%   BMI 31.48 kg/m²       Constitutional:  Well developed, well nourished, no acute distress.   Eyes:  Pupils equal, round, reactive to light and accommodation, extraocular movements intact. Conjunctivae pink.  Sclerae anicteric.  On funduscopic exam, normal optic discs and no papilledema.    HENT:  Normocephalic and atraumatic.  Bilateral external ears are normal.  On otoscopic exam, external auditory canals and tympanic membranes are within normal limits.  External nose appears normal.  Nasal mucosa, septum and turbinates appear normal.  Oropharynx moist and within normal limits.  Lips and gums within normal limits.  Neck:  Supple, nontender.  Normal range of motion.  No masses.  No thyromegaly.  Trachea midline.    Respiratory:  Clear to auscultation and percussion bilaterally.  No wheezes, rales, rhonchi or crackles.  Good respiratory effort.  No retraction or accessory muscle use.  Symmetrical chest expansion.    Cardiovascular:  Regular rate and rhythm. No murmurs, rubs, or gallops.  Point of maximal impulse nondisplaced.  Normal S1 and S2.  No S3 or S4.  No jugular venous distension.  No carotid bruits.  Good dorsalis pedis pulses bilaterally.  No peripheral edema.  Gastrointestinal:  Soft, nontender, nondistended.  No rebound or guarding.  Normal bowel sounds.  No hepatomegaly.  No splenomegaly.  No pulsatile or other abdominal masses.  No hernias noted.    Genitourinary:  Normal external genitalia.  No inguinal  

## 2025-08-12 ENCOUNTER — ANESTHESIA EVENT (OUTPATIENT)
Facility: HOSPITAL | Age: 75
End: 2025-08-12
Payer: MEDICARE

## 2025-08-12 RX ORDER — CHOLESTYRAMINE 4 G/9G
1 POWDER, FOR SUSPENSION ORAL
COMMUNITY

## 2025-08-12 ASSESSMENT — LIFESTYLE VARIABLES: SMOKING_STATUS: 1

## 2025-08-13 ENCOUNTER — HOSPITAL ENCOUNTER (OUTPATIENT)
Facility: HOSPITAL | Age: 75
Setting detail: OUTPATIENT SURGERY
Discharge: HOME | End: 2025-08-13
Attending: INTERNAL MEDICINE | Admitting: INTERNAL MEDICINE
Payer: MEDICARE

## 2025-08-13 ENCOUNTER — ANESTHESIA (OUTPATIENT)
Facility: HOSPITAL | Age: 75
End: 2025-08-13
Payer: MEDICARE

## 2025-08-13 VITALS
BODY MASS INDEX: 26.51 KG/M2 | SYSTOLIC BLOOD PRESSURE: 123 MMHG | DIASTOLIC BLOOD PRESSURE: 62 MMHG | RESPIRATION RATE: 14 BRPM | TEMPERATURE: 97.7 F | HEIGHT: 63 IN | WEIGHT: 149.6 LBS | HEART RATE: 71 BPM | OXYGEN SATURATION: 96 %

## 2025-08-13 PROCEDURE — 88305 TISSUE EXAM BY PATHOLOGIST: CPT

## 2025-08-13 PROCEDURE — 7100000011 HC PHASE II RECOVERY - ADDTL 15 MIN: Performed by: INTERNAL MEDICINE

## 2025-08-13 PROCEDURE — 7100000010 HC PHASE II RECOVERY - FIRST 15 MIN: Performed by: INTERNAL MEDICINE

## 2025-08-13 PROCEDURE — 2709999900 HC NON-CHARGEABLE SUPPLY: Performed by: INTERNAL MEDICINE

## 2025-08-13 PROCEDURE — 3600007502: Performed by: INTERNAL MEDICINE

## 2025-08-13 PROCEDURE — 3700000001 HC ADD 15 MINUTES (ANESTHESIA): Performed by: INTERNAL MEDICINE

## 2025-08-13 PROCEDURE — 3600007512: Performed by: INTERNAL MEDICINE

## 2025-08-13 PROCEDURE — 2580000003 HC RX 258: Performed by: NURSE ANESTHETIST, CERTIFIED REGISTERED

## 2025-08-13 PROCEDURE — 3700000000 HC ANESTHESIA ATTENDED CARE: Performed by: INTERNAL MEDICINE

## 2025-08-13 PROCEDURE — 6360000002 HC RX W HCPCS: Performed by: NURSE ANESTHETIST, CERTIFIED REGISTERED

## 2025-08-13 RX ORDER — SODIUM CHLORIDE 9 MG/ML
INJECTION, SOLUTION INTRAVENOUS
Status: DISCONTINUED | OUTPATIENT
Start: 2025-08-13 | End: 2025-08-13 | Stop reason: SDUPTHER

## 2025-08-13 RX ORDER — SODIUM CHLORIDE 9 MG/ML
INJECTION, SOLUTION INTRAVENOUS PRN
Status: CANCELLED | OUTPATIENT
Start: 2025-08-13

## 2025-08-13 RX ORDER — SODIUM CHLORIDE 0.9 % (FLUSH) 0.9 %
5-40 SYRINGE (ML) INJECTION EVERY 12 HOURS SCHEDULED
Status: CANCELLED | OUTPATIENT
Start: 2025-08-13

## 2025-08-13 RX ORDER — LIDOCAINE HYDROCHLORIDE 20 MG/ML
INJECTION, SOLUTION EPIDURAL; INFILTRATION; INTRACAUDAL; PERINEURAL
Status: DISCONTINUED | OUTPATIENT
Start: 2025-08-13 | End: 2025-08-13 | Stop reason: SDUPTHER

## 2025-08-13 RX ORDER — SODIUM CHLORIDE 0.9 % (FLUSH) 0.9 %
5-40 SYRINGE (ML) INJECTION PRN
Status: CANCELLED | OUTPATIENT
Start: 2025-08-13

## 2025-08-13 RX ADMIN — PROPOFOL 20 MG: 10 INJECTION, EMULSION INTRAVENOUS at 08:00

## 2025-08-13 RX ADMIN — PROPOFOL 20 MG: 10 INJECTION, EMULSION INTRAVENOUS at 08:01

## 2025-08-13 RX ADMIN — PROPOFOL 20 MG: 10 INJECTION, EMULSION INTRAVENOUS at 08:09

## 2025-08-13 RX ADMIN — PROPOFOL 20 MG: 10 INJECTION, EMULSION INTRAVENOUS at 08:05

## 2025-08-13 RX ADMIN — SODIUM CHLORIDE: 9 INJECTION, SOLUTION INTRAVENOUS at 07:53

## 2025-08-13 RX ADMIN — PROPOFOL 100 MG: 10 INJECTION, EMULSION INTRAVENOUS at 07:58

## 2025-08-13 RX ADMIN — LIDOCAINE HYDROCHLORIDE 50 MG: 20 INJECTION, SOLUTION EPIDURAL; INFILTRATION; INTRACAUDAL; PERINEURAL at 07:58

## 2025-08-13 RX ADMIN — PROPOFOL 20 MG: 10 INJECTION, EMULSION INTRAVENOUS at 07:59

## 2025-08-13 RX ADMIN — PROPOFOL 20 MG: 10 INJECTION, EMULSION INTRAVENOUS at 08:03

## 2025-08-13 RX ADMIN — PROPOFOL 20 MG: 10 INJECTION, EMULSION INTRAVENOUS at 08:07

## 2025-08-13 ASSESSMENT — PAIN - FUNCTIONAL ASSESSMENT: PAIN_FUNCTIONAL_ASSESSMENT: 0-10

## 2025-08-13 ASSESSMENT — PAIN SCALES - GENERAL: PAINLEVEL_OUTOF10: 0

## (undated) DEVICE — (D)PREP SKN CHLRAPRP APPL 26ML -- CONVERT TO ITEM 371833

## (undated) DEVICE — BAG SPEC BIOHZD LF 2MIL 6X10IN -- CONVERT TO ITEM 357326

## (undated) DEVICE — SNARE ENDOSCP M L240CM W27MM SHTH DIA2.4MM CHN 2.8MM OVL

## (undated) DEVICE — DRAPE FLD WRM W44XL66IN C6L FOR INTRATEMP SYS THERMABASIN

## (undated) DEVICE — BAG BELONG PT PERS CLEAR HANDL

## (undated) DEVICE — AIRLIFE™ U/CONNECT-IT OXYGEN TUBING 7 FEET (2.1 M) CRUSH-RESISTANT OXYGEN TUBING, VINYL TIPPED: Brand: AIRLIFE™

## (undated) DEVICE — SOLUTION IV 1000ML 0.9% SOD CHL

## (undated) DEVICE — 1200 GUARD II KIT W/5MM TUBE W/O VAC TUBE: Brand: GUARDIAN

## (undated) DEVICE — SYRINGE MED 20ML STD CLR PLAS LUERLOCK TIP N CTRL DISP

## (undated) DEVICE — DRAIN SURG 15FR RND FULL FLUT

## (undated) DEVICE — QUILTED PREMIUM COMFORT UNDERPAD,EXTRA HEAVY: Brand: WINGS

## (undated) DEVICE — FORCEPS BX L240CM JAW DIA2.8MM L CAP W/ NDL MIC MESH TOOTH

## (undated) DEVICE — ENDO CARRY-ON PROCEDURE KIT INCLUDES ENZYMATIC SPONGE, GAUZE, BIOHAZARD LABEL, TRAY, LUBRICANT, DIRTY SCOPE LABEL, WATER LABEL, TRAY, DRAWSTRING PAD, AND DEFENDO 4-PIECE KIT.: Brand: ENDO CARRY-ON PROCEDURE KIT

## (undated) DEVICE — SUTURE PERMAHAND SZ 2-0 L30IN NONABSORBABLE BLK SILK W/O A305H

## (undated) DEVICE — SET GRAV CK VLV NEEDLESS ST 3 GANGED 4WAY STPCOCK HI FLO 10

## (undated) DEVICE — SET ADMIN 16ML TBNG L100IN 2 Y INJ SITE IV PIGGY BK DISP

## (undated) DEVICE — TRAP SURG QUAD PARABOLA SLOT DSGN SFTY SCRN TRAPEASE

## (undated) DEVICE — CATH IV AUTOGRD BC BLU 22GA 25 -- INSYTE

## (undated) DEVICE — Device: Brand: MEDICAL ACTION INDUSTRIES

## (undated) DEVICE — BW-412T DISP COMBO CLEANING BRUSH: Brand: SINGLE USE COMBINATION CLEANING BRUSH

## (undated) DEVICE — ROCKER SWITCH PENCIL BLADE ELECTRODE, HOLSTER: Brand: EDGE

## (undated) DEVICE — FORCEPS BX L240CM JAW DIA2.4MM ORNG L CAP W/ NDL DISP RAD

## (undated) DEVICE — SPONGE LAP 18X18IN STRL -- 5/PK

## (undated) DEVICE — CONNECTOR TBNG AUX H2O JET DISP FOR OLY 160/180 SER

## (undated) DEVICE — NEEDLE HYPO 18GA L1.5IN PNK S STL HUB POLYPR SHLD REG BVL

## (undated) DEVICE — Device

## (undated) DEVICE — CANN NASAL O2 CAPNOGRAPHY AD -- FILTERLINE

## (undated) DEVICE — SUTURE PERMAHAND SZ 3-0 L30IN NONABSORBABLE BLK L26MM SH C017D

## (undated) DEVICE — STERILE POLYISOPRENE POWDER-FREE SURGICAL GLOVES WITH EMOLLIENT COATING: Brand: PROTEXIS

## (undated) DEVICE — TRAP ENDOSCP POLYP 2 CHMBR DRAWER TYP

## (undated) DEVICE — ENDOSCOPIC KIT COMPLIANCE ENDOKIT

## (undated) DEVICE — CONTAINER SPEC 20 ML LID NEUT BUFF FORMALIN 10 % POLYPR STS

## (undated) DEVICE — DERMABOND SKIN ADH 0.7ML -- DERMABOND ADVANCED 12/BX

## (undated) DEVICE — KIT IV STRT W CHLORAPREP PD 1ML

## (undated) DEVICE — Z DISCONTINUED NO SUB IDED SET EXTN W/ 4 W STPCOCK M SPIN LOK 36IN

## (undated) DEVICE — SET EXTN TBNG L BOR 4 W STPCOCK ST 32IN PRIMING VOL 6ML

## (undated) DEVICE — REM POLYHESIVE ADULT PATIENT RETURN ELECTRODE: Brand: VALLEYLAB

## (undated) DEVICE — STERILE POLYISOPRENE POWDER-FREE SURGICAL GLOVES: Brand: PROTEXIS

## (undated) DEVICE — INSULATED BLADE ELECTRODE;CAUTION: FOR MANUFACTURING, PROCESSING, OR REPACKING.: Brand: EDGE

## (undated) DEVICE — NEONATAL-ADULT SPO2 SENSOR: Brand: NELLCOR

## (undated) DEVICE — Z DISCONTINUED USE 2751540 TUBING IRRIG L10IN DISP PMP ENDOGATOR

## (undated) DEVICE — DBD-PACK,LAPAROTOMY,2 REINFORCED GOWNS: Brand: MEDLINE

## (undated) DEVICE — SLIM BODY SKIN STAPLER: Brand: APPOSE ULC

## (undated) DEVICE — INSULATED BLADE ELECTRODE: Brand: EDGE

## (undated) DEVICE — KENDALL RADIOLUCENT FOAM MONITORING ELECTRODE -RECTANGULAR SHAPE: Brand: KENDALL

## (undated) DEVICE — SURGICAL PROCEDURE PACK BASIN MAJ SET CUST NO CAUT

## (undated) DEVICE — KENDALL SCD EXPRESS SLEEVES, KNEE LENGTH, MEDIUM: Brand: KENDALL SCD

## (undated) DEVICE — HANDLE LT SNAP ON ULT DURABLE LENS FOR TRUMPF ALC DISPOSABLE

## (undated) DEVICE — GOWN,PREVENTION PLUS,XL,ST,24/CS: Brand: MEDLINE

## (undated) DEVICE — SUTURE PDS II SZ 1 L36IN ABSRB VLT L48MM CTX 1/2 CIR Z371T

## (undated) DEVICE — DEVON™ KNEE AND BODY STRAP 60" X 3" (1.5 M X 7.6 CM): Brand: DEVON

## (undated) DEVICE — SOLIDIFIER FLUID 3000 CC ABSORB

## (undated) DEVICE — WRAP SURG W1.31XL1.34M CARD FOR PT 165-172CM THERMOWRP

## (undated) DEVICE — TRAY CATH 16F DRN BG LTX -- CONVERT TO ITEM 363158

## (undated) DEVICE — SUTURE ETHLN SZ 2-0 L18IN NONABSORBABLE BLK L19MM PS-2 PRIM 593H

## (undated) DEVICE — CUFF BLD PRSS AD CLTH SGL TB W/ BAYNT CONN ROUNDED CORNER

## (undated) DEVICE — IV START KIT WITH SECUREMENT DEVICES

## (undated) DEVICE — INFECTION CONTROL KIT SYS

## (undated) DEVICE — TOWEL SURG W17XL27IN STD BLU COT NONFENESTRATED PREWASHED